# Patient Record
Sex: MALE | Race: BLACK OR AFRICAN AMERICAN | NOT HISPANIC OR LATINO | Employment: OTHER | URBAN - METROPOLITAN AREA
[De-identification: names, ages, dates, MRNs, and addresses within clinical notes are randomized per-mention and may not be internally consistent; named-entity substitution may affect disease eponyms.]

---

## 2019-10-04 ENCOUNTER — APPOINTMENT (OUTPATIENT)
Dept: LAB | Facility: HOSPITAL | Age: 64
End: 2019-10-04

## 2019-10-04 ENCOUNTER — OFFICE VISIT (OUTPATIENT)
Dept: FAMILY MEDICINE CLINIC | Facility: CLINIC | Age: 64
End: 2019-10-04

## 2019-10-04 VITALS
WEIGHT: 193 LBS | DIASTOLIC BLOOD PRESSURE: 92 MMHG | BODY MASS INDEX: 30.29 KG/M2 | RESPIRATION RATE: 16 BRPM | HEART RATE: 84 BPM | OXYGEN SATURATION: 100 % | HEIGHT: 67 IN | SYSTOLIC BLOOD PRESSURE: 200 MMHG

## 2019-10-04 DIAGNOSIS — Z23 IMMUNIZATION DUE: ICD-10-CM

## 2019-10-04 DIAGNOSIS — I10 ESSENTIAL HYPERTENSION: ICD-10-CM

## 2019-10-04 DIAGNOSIS — I16.0 HYPERTENSIVE URGENCY: Primary | ICD-10-CM

## 2019-10-04 DIAGNOSIS — E11.65 TYPE 2 DIABETES MELLITUS WITH HYPERGLYCEMIA, WITH LONG-TERM CURRENT USE OF INSULIN (HCC): ICD-10-CM

## 2019-10-04 DIAGNOSIS — Z79.4 TYPE 2 DIABETES MELLITUS WITH HYPERGLYCEMIA, WITH LONG-TERM CURRENT USE OF INSULIN (HCC): ICD-10-CM

## 2019-10-04 DIAGNOSIS — I25.810 CORONARY ARTERY DISEASE INVOLVING CORONARY BYPASS GRAFT OF NATIVE HEART, ANGINA PRESENCE UNSPECIFIED: ICD-10-CM

## 2019-10-04 LAB
ALBUMIN SERPL-MCNC: 4.5 G/DL (ref 3.5–5.2)
ALBUMIN UR-MCNC: 10.8 MG/DL
ALBUMIN/GLOB SERPL: 1.3 G/DL
ALP SERPL-CCNC: 61 U/L (ref 39–117)
ALT SERPL W P-5'-P-CCNC: 40 U/L (ref 1–41)
ANION GAP SERPL CALCULATED.3IONS-SCNC: 12.3 MMOL/L (ref 5–15)
AST SERPL-CCNC: 26 U/L (ref 1–40)
BACTERIA UR QL AUTO: ABNORMAL /HPF
BASOPHILS # BLD AUTO: 0.07 10*3/MM3 (ref 0–0.2)
BASOPHILS NFR BLD AUTO: 1.1 % (ref 0–1.5)
BILIRUB SERPL-MCNC: 0.2 MG/DL (ref 0.2–1.2)
BILIRUB UR QL STRIP: NEGATIVE
BUN BLD-MCNC: 13 MG/DL (ref 8–23)
BUN/CREAT SERPL: 11.8 (ref 7–25)
CALCIUM SPEC-SCNC: 9.4 MG/DL (ref 8.6–10.5)
CHLORIDE SERPL-SCNC: 102 MMOL/L (ref 98–107)
CLARITY UR: CLEAR
CO2 SERPL-SCNC: 25.7 MMOL/L (ref 22–29)
COLOR UR: YELLOW
CREAT BLD-MCNC: 1.1 MG/DL (ref 0.76–1.27)
CREAT UR-MCNC: 193.5 MG/DL
DEPRECATED RDW RBC AUTO: 48.9 FL (ref 37–54)
EOSINOPHIL # BLD AUTO: 0.38 10*3/MM3 (ref 0–0.4)
EOSINOPHIL NFR BLD AUTO: 5.9 % (ref 0.3–6.2)
ERYTHROCYTE [DISTWIDTH] IN BLOOD BY AUTOMATED COUNT: 15.9 % (ref 12.3–15.4)
GFR SERPL CREATININE-BSD FRML MDRD: 82 ML/MIN/1.73
GLOBULIN UR ELPH-MCNC: 3.6 GM/DL
GLUCOSE BLD-MCNC: 163 MG/DL (ref 65–99)
GLUCOSE UR STRIP-MCNC: NEGATIVE MG/DL
HBA1C MFR BLD: 7.3 % (ref 4.8–5.6)
HCT VFR BLD AUTO: 49.4 % (ref 37.5–51)
HGB BLD-MCNC: 16.3 G/DL (ref 13–17.7)
HGB UR QL STRIP.AUTO: NEGATIVE
HYALINE CASTS UR QL AUTO: ABNORMAL /LPF
IMM GRANULOCYTES # BLD AUTO: 0.03 10*3/MM3 (ref 0–0.05)
IMM GRANULOCYTES NFR BLD AUTO: 0.5 % (ref 0–0.5)
KETONES UR QL STRIP: ABNORMAL
LEUKOCYTE ESTERASE UR QL STRIP.AUTO: NEGATIVE
LYMPHOCYTES # BLD AUTO: 2.51 10*3/MM3 (ref 0.7–3.1)
LYMPHOCYTES NFR BLD AUTO: 38.9 % (ref 19.6–45.3)
MCH RBC QN AUTO: 28.7 PG (ref 26.6–33)
MCHC RBC AUTO-ENTMCNC: 33 G/DL (ref 31.5–35.7)
MCV RBC AUTO: 87 FL (ref 79–97)
MICROALBUMIN/CREAT UR: 55.8 MG/G
MONOCYTES # BLD AUTO: 0.5 10*3/MM3 (ref 0.1–0.9)
MONOCYTES NFR BLD AUTO: 7.8 % (ref 5–12)
MUCOUS THREADS URNS QL MICRO: ABNORMAL /HPF
NEUTROPHILS # BLD AUTO: 2.96 10*3/MM3 (ref 1.7–7)
NEUTROPHILS NFR BLD AUTO: 45.8 % (ref 42.7–76)
NITRITE UR QL STRIP: NEGATIVE
NRBC BLD AUTO-RTO: 0 /100 WBC (ref 0–0.2)
PH UR STRIP.AUTO: 6 [PH] (ref 5–8)
PLATELET # BLD AUTO: 496 10*3/MM3 (ref 140–450)
PMV BLD AUTO: 9.8 FL (ref 6–12)
POTASSIUM BLD-SCNC: 4.3 MMOL/L (ref 3.5–5.2)
PROT SERPL-MCNC: 8.1 G/DL (ref 6–8.5)
PROT UR QL STRIP: ABNORMAL
RBC # BLD AUTO: 5.68 10*6/MM3 (ref 4.14–5.8)
RBC # UR: ABNORMAL /HPF
REF LAB TEST METHOD: ABNORMAL
SODIUM BLD-SCNC: 140 MMOL/L (ref 136–145)
SP GR UR STRIP: 1.02 (ref 1–1.03)
SPERM URNS QL MICRO: ABNORMAL /HPF
SQUAMOUS #/AREA URNS HPF: ABNORMAL /HPF
TSH SERPL DL<=0.05 MIU/L-ACNC: 0.35 UIU/ML (ref 0.27–4.2)
UROBILINOGEN UR QL STRIP: ABNORMAL
WBC NRBC COR # BLD: 6.45 10*3/MM3 (ref 3.4–10.8)
WBC UR QL AUTO: ABNORMAL /HPF

## 2019-10-04 PROCEDURE — 80053 COMPREHEN METABOLIC PANEL: CPT | Performed by: INTERNAL MEDICINE

## 2019-10-04 PROCEDURE — 90471 IMMUNIZATION ADMIN: CPT | Performed by: INTERNAL MEDICINE

## 2019-10-04 PROCEDURE — 90674 CCIIV4 VAC NO PRSV 0.5 ML IM: CPT | Performed by: INTERNAL MEDICINE

## 2019-10-04 PROCEDURE — 82043 UR ALBUMIN QUANTITATIVE: CPT | Performed by: INTERNAL MEDICINE

## 2019-10-04 PROCEDURE — 82570 ASSAY OF URINE CREATININE: CPT | Performed by: INTERNAL MEDICINE

## 2019-10-04 PROCEDURE — 84443 ASSAY THYROID STIM HORMONE: CPT | Performed by: INTERNAL MEDICINE

## 2019-10-04 PROCEDURE — 85025 COMPLETE CBC W/AUTO DIFF WBC: CPT | Performed by: INTERNAL MEDICINE

## 2019-10-04 PROCEDURE — 83036 HEMOGLOBIN GLYCOSYLATED A1C: CPT | Performed by: INTERNAL MEDICINE

## 2019-10-04 PROCEDURE — 99204 OFFICE O/P NEW MOD 45 MIN: CPT | Performed by: INTERNAL MEDICINE

## 2019-10-04 PROCEDURE — 81001 URINALYSIS AUTO W/SCOPE: CPT | Performed by: INTERNAL MEDICINE

## 2019-10-04 RX ORDER — INSULIN GLARGINE 100 [IU]/ML
INJECTION, SOLUTION SUBCUTANEOUS
COMMUNITY
Start: 2019-09-10 | End: 2019-10-04 | Stop reason: SDUPTHER

## 2019-10-04 RX ORDER — AMLODIPINE BESYLATE 10 MG/1
TABLET ORAL
COMMUNITY
Start: 2019-08-16 | End: 2019-10-04 | Stop reason: SDUPTHER

## 2019-10-04 RX ORDER — ASPIRIN 81 MG/1
81 TABLET ORAL DAILY
Qty: 90 TABLET | Refills: 3 | Status: SHIPPED | OUTPATIENT
Start: 2019-10-04 | End: 2019-12-10 | Stop reason: SDUPTHER

## 2019-10-04 RX ORDER — ASPIRIN 81 MG/1
81 TABLET ORAL DAILY
COMMUNITY
End: 2019-10-04 | Stop reason: SDUPTHER

## 2019-10-04 RX ORDER — METOPROLOL TARTRATE 50 MG/1
50 TABLET, FILM COATED ORAL 2 TIMES DAILY
Qty: 180 TABLET | Refills: 3 | Status: SHIPPED | OUTPATIENT
Start: 2019-10-04 | End: 2019-11-14

## 2019-10-04 RX ORDER — METOPROLOL TARTRATE 50 MG/1
TABLET, FILM COATED ORAL
COMMUNITY
Start: 2019-08-16 | End: 2019-10-04 | Stop reason: SDUPTHER

## 2019-10-04 RX ORDER — CLOPIDOGREL BISULFATE 75 MG/1
75 TABLET ORAL DAILY
Qty: 90 TABLET | Refills: 3 | Status: SHIPPED | OUTPATIENT
Start: 2019-10-04 | End: 2019-12-10 | Stop reason: SDUPTHER

## 2019-10-04 RX ORDER — INSULIN GLARGINE 100 [IU]/ML
100 INJECTION, SOLUTION SUBCUTANEOUS DAILY
Qty: 7 PEN | Refills: 5 | Status: ON HOLD | OUTPATIENT
Start: 2019-10-04 | End: 2019-12-07 | Stop reason: SDUPTHER

## 2019-10-04 RX ORDER — AMLODIPINE BESYLATE 10 MG/1
10 TABLET ORAL DAILY
Qty: 90 TABLET | Refills: 3 | Status: SHIPPED | OUTPATIENT
Start: 2019-10-04 | End: 2019-12-10 | Stop reason: SDUPTHER

## 2019-10-04 RX ORDER — LOSARTAN POTASSIUM 100 MG/1
100 TABLET ORAL DAILY
Qty: 90 TABLET | Refills: 3 | Status: SHIPPED | OUTPATIENT
Start: 2019-10-04 | End: 2019-12-07 | Stop reason: HOSPADM

## 2019-10-04 RX ORDER — LOSARTAN POTASSIUM 100 MG/1
TABLET ORAL
COMMUNITY
Start: 2019-09-16 | End: 2019-10-04 | Stop reason: SDUPTHER

## 2019-10-04 NOTE — PROGRESS NOTES
Chief Complaint:  Establish care, hypertension    HPI:  Gal Charles Pierce is a 64 y.o. male who presents today for establish care and follow-up chronic medical conditions.  Diabetes type 2-currently taking metformin thousand milligrams twice daily along with Basaglar 100 units in the morning.  Patient reports fasting sugars are typically 101 20s.  He reports his recent A1c several months ago was around 9%.  Hypertension-patient reports he has been taking medication every other day due to low supply.  He did not take any of his medication today.  Blood pressure checks at home consistently 140/90, sometimes 150 systolic.  Coronary artery disease- patient has status post triple bypass exactly 1 year ago, he has a history of 2 prior heart attacks with stenting.  His previous cardiology was at .  He denies any chest pain or shortness of breath.  Patient reports a provider recently told him to stop Plavix.  Is currently just taking aspirin daily.  Hyper lipidemia-patient reports history of high cholesterol although he cannot tolerate statins.  ROS:  Constitutional: no fevers, night sweats or unexplained weight loss  Eyes: no vision changes  ENT: no runny nose, ear pain, sore throat  Cardio: no chest pain, palpitations  Pulm: no shortness of breath, wheezing, or cough  GI: no abdominal pain or changes in bowel movements  : no difficulty urinating  MSK: no difficulty ambulating, no joint pain  Neuro: no weakness, dizziness or headache  Psych: no trouble sleeping  Endo: no change in appetite      Past Medical History:   Diagnosis Date   • Diabetes mellitus (CMS/[facility])    • Hyperlipidemia    • Hypertension       Family History   Problem Relation Age of Onset   • Diabetes Mother    • Aneurysm Mother    • Hypertension Father    • Diabetes Father    • Heart attack Father       Social History     Socioeconomic History   • Marital status:      Spouse name: Not on file   • Number of children: Not on file   • Years of  education: Not on file   • Highest education level: Not on file   Tobacco Use   • Smoking status: Never Smoker   • Smokeless tobacco: Never Used   Substance and Sexual Activity   • Alcohol use: No     Frequency: Never   • Drug use: No   • Sexual activity: Defer      Allergies   Allergen Reactions   • Crestor [Rosuvastatin Calcium] Myalgia   • Lipitor [Atorvastatin Calcium] Myalgia   • Penicillins Swelling        There is no immunization history on file for this patient.     PE:  Vitals:    10/04/19 1441   BP: (!) 200/92   Pulse: 84   Resp: 16   SpO2: 100%        Gen Appearance: NAD  HEENT: Normocephalic, PERRLA, no thyromegaly, trache midline  Heart: RRR, normal S1 and S2, no murmur  Lungs: CTA b/l, no wheezing, no crackles  Abdomen: Soft, non-tender, non-distended, no guarding and BSx4  MSK: Moves all extremities well, normal gait, no peripheral edema  Pulses: Palpable and equal b/l  Lymph nodes: No palpable lymphadenopathy   Neuro: No focal deficits      Current Outpatient Medications   Medication Sig Dispense Refill   • amLODIPine (NORVASC) 10 MG tablet Take 1 tablet by mouth Daily. 90 tablet 3   • aspirin 81 MG EC tablet Take 1 tablet by mouth Daily. 90 tablet 3   • Insulin Glargine (BASAGLAR KWIKPEN) 100 UNIT/ML injection pen Inject 100 Units under the skin into the appropriate area as directed Daily. 7 pen 5   • losartan (COZAAR) 100 MG tablet Take 1 tablet by mouth Daily. 90 tablet 3   • metoprolol tartrate (LOPRESSOR) 50 MG tablet Take 1 tablet by mouth 2 (Two) Times a Day. 180 tablet 3   • Multiple Vitamins-Minerals (CENTRUM SILVER PO) Take  by mouth.     • clopidogrel (PLAVIX) 75 MG tablet Take 1 tablet by mouth Daily. 90 tablet 3   • metFORMIN (GLUCOPHAGE) 1000 MG tablet Take 1 tablet by mouth 2 (Two) Times a Day With Meals. 120 tablet 5     No current facility-administered medications for this visit.         Gal was seen today for establish care.    Diagnoses and all orders for this  visit:    Hypertensive urgency  -     amLODIPine (NORVASC) 10 MG tablet; Take 1 tablet by mouth Daily.  -     losartan (COZAAR) 100 MG tablet; Take 1 tablet by mouth Daily.  -     metoprolol tartrate (LOPRESSOR) 50 MG tablet; Take 1 tablet by mouth 2 (Two) Times a Day.  -     aspirin 81 MG EC tablet; Take 1 tablet by mouth Daily.  Checking blood work today.  Recommend resuming previously prescribed medications.  Will likely need to add on diuretic with his current home blood pressure readings.  Follow-up 2 weeks for reassessment.  If still over 130/80 would recommend adding on chlorthalidone.  Essential hypertension  -     amLODIPine (NORVASC) 10 MG tablet; Take 1 tablet by mouth Daily.  -     losartan (COZAAR) 100 MG tablet; Take 1 tablet by mouth Daily.  -     metoprolol tartrate (LOPRESSOR) 50 MG tablet; Take 1 tablet by mouth 2 (Two) Times a Day.  -     aspirin 81 MG EC tablet; Take 1 tablet by mouth Daily.  -     TSH; Future  -     Urinalysis With Culture If Indicated - Urine, Clean Catch; Future  See above.  Type 2 diabetes mellitus with hyperglycemia, with long-term current use of insulin (CMS/[facility])  -     Insulin Glargine (BASAGLAR KWIKPEN) 100 UNIT/ML injection pen; Inject 100 Units under the skin into the appropriate area as directed Daily.  -     aspirin 81 MG EC tablet; Take 1 tablet by mouth Daily.  -     CBC & Differential; Future  -     Comprehensive Metabolic Panel; Future  -     Cancel: Lipid Panel; Future  -     Hemoglobin A1c; Future  -     Microalbumin / Creatinine Urine Ratio - Urine, Clean Catch; Future  -     metFORMIN (GLUCOPHAGE) 1000 MG tablet; Take 1 tablet by mouth 2 (Two) Times a Day With Meals.  Rechecking A1c today.  Recommend checking blood sugar multiple times throughout the day along with fasting sugar.  Follow-up 2 weeks.  Bring values in at that time.  Continue current regimen for now.  Sending in refill on metformin.  Coronary artery disease involving coronary bypass graft of  native heart, angina presence unspecified  -     clopidogrel (PLAVIX) 75 MG tablet; Take 1 tablet by mouth Daily.  Recommend resuming Plavix with his history of multiple stents and recent bypass.  Will place cardiology referral to establish care.  Asymptomatic at this time.  On aspirin and beta-blocker, cannot tolerate statins.  HLD  Recommend repeat fasting lipid panel.  We will request [facility] records as well.    No Follow-up on file.     Please note that portions of this document were completed with a voice recognition program. Efforts were made to edit the dictations, but occasionally words are mis-transcribed.

## 2019-10-22 ENCOUNTER — OFFICE VISIT (OUTPATIENT)
Dept: FAMILY MEDICINE CLINIC | Facility: CLINIC | Age: 64
End: 2019-10-22

## 2019-10-22 VITALS
SYSTOLIC BLOOD PRESSURE: 132 MMHG | DIASTOLIC BLOOD PRESSURE: 66 MMHG | BODY MASS INDEX: 30.35 KG/M2 | WEIGHT: 193.4 LBS | HEART RATE: 80 BPM | HEIGHT: 67 IN | OXYGEN SATURATION: 96 %

## 2019-10-22 DIAGNOSIS — I25.810 CORONARY ARTERY DISEASE INVOLVING CORONARY BYPASS GRAFT OF NATIVE HEART WITHOUT ANGINA PECTORIS: ICD-10-CM

## 2019-10-22 DIAGNOSIS — E11.65 TYPE 2 DIABETES MELLITUS WITH HYPERGLYCEMIA, WITH LONG-TERM CURRENT USE OF INSULIN (HCC): Primary | ICD-10-CM

## 2019-10-22 DIAGNOSIS — Z79.4 TYPE 2 DIABETES MELLITUS WITH HYPERGLYCEMIA, WITH LONG-TERM CURRENT USE OF INSULIN (HCC): Primary | ICD-10-CM

## 2019-10-22 DIAGNOSIS — I10 ESSENTIAL HYPERTENSION: ICD-10-CM

## 2019-10-22 PROCEDURE — 99214 OFFICE O/P EST MOD 30 MIN: CPT | Performed by: INTERNAL MEDICINE

## 2019-11-13 NOTE — PROGRESS NOTES
[facility]  [facility]      Encounter Date:11/14/2019     Gal Pierce  [address] [address]  [unfilled]    1955    Umer Tovar,     Gal Pierce is a 64 y.o. male.      Subjective:     Chief Complaint:  Establish Care (CABG)       HPI   Patient is a 64-year-old male with past medical history significant for hypertension, type 2 diabetes, hyperlipidemia and coronary artery disease status post CABG who presents to the [facility] at the request of Dr. Tovar to establish care.  Patient has a history of coronary artery disease status post non-STEMI in 2002 with drug-eluting stent to the LAD, non-STEMI 2004 with drug-eluting stent to the left circumflex and anterior wall STEMI in October 2018 status post four-vessel CABG with Dr. Slater.  He is currently asymptomatic.  He reports that he is active without any exertional shortness of breath or chest pain.  He reports that he monitors his blood pressure at home and systolic blood pressure is typically around 160, which he thought was good for him.  He has a history of statin intolerance and reports that he is tried atorvastatin, rosuvastatin and simvastatin.  He is very reluctant to start any new medications today    Patient Active Problem List   Diagnosis   • Essential hypertension   • Type 2 diabetes mellitus with hyperglycemia, with long-term current use of insulin (CMS/HCC)   • Coronary artery disease involving coronary bypass graft of native heart   • Hyperlipidemia LDL goal <70       Past Medical History:   Diagnosis Date   • Diabetes mellitus (CMS/HCC)    • Hyperlipidemia    • Hypertension        Past Surgical History:   Procedure Laterality Date   • APPENDECTOMY     • BYPASS GRAFT  10/2018    Triple Bypass       Family History   Problem Relation Age of Onset   • Diabetes Mother    • Aneurysm Mother    • Hypertension Father    • Diabetes Father    • Heart attack Father    •  Diabetes Sister    • No Known Problems Brother    • Diabetes Maternal Grandmother    • Hypertension Maternal Grandmother    • Diabetes Maternal Grandfather    • Hypertension Maternal Grandfather    • Diabetes Paternal Grandmother    • Hypertension Paternal Grandmother    • Diabetes Paternal Grandfather    • Hypertension Paternal Grandfather    • Diabetes Sister    • Diabetes Sister    • Cancer Sister    • Hypertension Brother    • Heart disease Brother    • Diabetes Brother    • Hypertension Brother        Social History     Socioeconomic History   • Marital status:      Spouse name: Not on file   • Number of children: Not on file   • Years of education: Not on file   • Highest education level: Not on file   Tobacco Use   • Smoking status: Former Smoker   • Smokeless tobacco: Never Used   Substance and Sexual Activity   • Alcohol use: No     Frequency: Never   • Drug use: No   • Sexual activity: Defer   Social History Narrative    Caffeine: 2-3 cups coffee daily, rare soda       Allergies   Allergen Reactions   • Crestor [Rosuvastatin Calcium] Myalgia   • Hydrochlorothiazide Other (See Comments)   • Lipitor [Atorvastatin Calcium] Myalgia   • Penicillins Swelling         Current Outpatient Medications:   •  amLODIPine (NORVASC) 10 MG tablet, Take 1 tablet by mouth Daily., Disp: 90 tablet, Rfl: 3  •  aspirin 81 MG EC tablet, Take 1 tablet by mouth Daily., Disp: 90 tablet, Rfl: 3  •  clopidogrel (PLAVIX) 75 MG tablet, Take 1 tablet by mouth Daily., Disp: 90 tablet, Rfl: 3  •  Insulin Glargine (BASAGLAR KWIKPEN) 100 UNIT/ML injection pen, Inject 100 Units under the skin into the appropriate area as directed Daily., Disp: 7 pen, Rfl: 5  •  losartan (COZAAR) 100 MG tablet, Take 1 tablet by mouth Daily., Disp: 90 tablet, Rfl: 3  •  metFORMIN (GLUCOPHAGE) 1000 MG tablet, Take 1 tablet by mouth 2 (Two) Times a Day With Meals., Disp: 120 tablet, Rfl: 5  •  Multiple Vitamins-Minerals (CENTRUM SILVER PO), Take  by  "mouth., Disp: , Rfl:   •  carvedilol (COREG) 12.5 MG tablet, Take 1 tablet by mouth 2 (Two) Times a Day., Disp: 60 tablet, Rfl: 3  •  pravastatin (PRAVACHOL) 40 MG tablet, Take 1 tablet by mouth Every Night., Disp: 30 tablet, Rfl: 3    The following portions of the patient's history were reviewed today and updated as appropriate: allergies, current medications, past family history, past medical history, past social history, past surgical history and problem list     Review of Systems   Constitution: Negative for chills and fever.   HENT: Negative.    Eyes: Negative.    Cardiovascular: Negative for chest pain, claudication, cyanosis, dyspnea on exertion, irregular heartbeat, leg swelling, near-syncope, orthopnea, palpitations, paroxysmal nocturnal dyspnea and syncope.   Respiratory: Negative for cough, shortness of breath and snoring.    Endocrine: Negative.    Hematologic/Lymphatic: Does not bruise/bleed easily.   Skin: Negative for poor wound healing.   Musculoskeletal: Negative.    Gastrointestinal: Negative for abdominal pain, heartburn, hematemesis, melena, nausea and vomiting.   Genitourinary: Negative.  Negative for hematuria.   Neurological: Positive for light-headedness.   Psychiatric/Behavioral: Negative.    Allergic/Immunologic: Positive for environmental allergies.       Objective:     Vitals:    11/14/19 1012 11/14/19 1013 11/14/19 1014   BP: 167/78 176/76 172/80   BP Location: Right arm Left arm Left arm   Patient Position: Sitting Sitting Standing   Cuff Size: Large Adult Large Adult Large Adult   Pulse: 73  74   Resp: 20     Temp: 98.8 °F (37.1 °C)     TempSrc: Temporal     SpO2: 97%  92%   Weight: 90.4 kg (199 lb 4 oz)     Height: 170.2 cm (67\")         Body mass index is 31.21 kg/m².    Physical Exam   Constitutional: He is oriented to person, place, and time. He appears well-developed and well-nourished. No distress.   HENT:   Head: Normocephalic.   Eyes: Conjunctivae are normal. Pupils are equal, " round, and reactive to light.   Neck: Neck supple. No JVD present. No thyromegaly present.   Cardiovascular: Normal rate, regular rhythm, normal heart sounds and intact distal pulses. Exam reveals no gallop and no friction rub.   No murmur heard.  Pulmonary/Chest: Effort normal and breath sounds normal. No respiratory distress. He has no wheezes. He has no rales. He exhibits no tenderness.   Abdominal: Soft. Bowel sounds are normal.   Musculoskeletal: Normal range of motion. He exhibits no edema.   Neurological: He is alert and oriented to person, place, and time.   Skin: Skin is warm and dry.   Psychiatric: He has a normal mood and affect. His behavior is normal. Thought content normal.   Vitals reviewed.      Lab and Diagnostic Review:  Results for orders placed or performed in visit on 10/04/19   Comprehensive Metabolic Panel   Result Value Ref Range    Glucose 163 (H) 65 - 99 mg/dL    BUN 13 8 - 23 mg/dL    Creatinine 1.10 0.76 - 1.27 mg/dL    Sodium 140 136 - 145 mmol/L    Potassium 4.3 3.5 - 5.2 mmol/L    Chloride 102 98 - 107 mmol/L    CO2 25.7 22.0 - 29.0 mmol/L    Calcium 9.4 8.6 - 10.5 mg/dL    Total Protein 8.1 6.0 - 8.5 g/dL    Albumin 4.50 3.50 - 5.20 g/dL    ALT (SGPT) 40 1 - 41 U/L    AST (SGOT) 26 1 - 40 U/L    Alkaline Phosphatase 61 39 - 117 U/L    Total Bilirubin 0.2 0.2 - 1.2 mg/dL    eGFR  African Amer 82 >60 mL/min/1.73    Globulin 3.6 gm/dL    A/G Ratio 1.3 g/dL    BUN/Creatinine Ratio 11.8 7.0 - 25.0    Anion Gap 12.3 5.0 - 15.0 mmol/L   TSH   Result Value Ref Range    TSH 0.348 0.270 - 4.200 uIU/mL   Urinalysis With Culture If Indicated - Urine, Clean Catch   Result Value Ref Range    Color, UA Yellow Yellow, Straw    Appearance, UA Clear Clear    pH, UA 6.0 5.0 - 8.0    Specific Gravity, UA 1.025 1.005 - 1.030    Glucose, UA Negative Negative    Ketones, UA Trace (A) Negative    Bilirubin, UA Negative Negative    Blood, UA Negative Negative    Protein, UA 30 mg/dL (1+) (A) Negative    Leuk  Esterase, UA Negative Negative    Nitrite, UA Negative Negative    Urobilinogen, UA 0.2 E.U./dL 0.2 - 1.0 E.U./dL   Hemoglobin A1c   Result Value Ref Range    Hemoglobin A1C 7.30 (H) 4.80 - 5.60 %   Microalbumin / Creatinine Urine Ratio - Urine, Clean Catch   Result Value Ref Range    Microalbumin/Creatinine Ratio 55.8 mg/g    Creatinine, Urine 193.5 mg/dL    Microalbumin, Urine 10.8 mg/dL   CBC Auto Differential   Result Value Ref Range    WBC 6.45 3.40 - 10.80 10*3/mm3    RBC 5.68 4.14 - 5.80 10*6/mm3    Hemoglobin 16.3 13.0 - 17.7 g/dL    Hematocrit 49.4 37.5 - 51.0 %    MCV 87.0 79.0 - 97.0 fL    MCH 28.7 26.6 - 33.0 pg    MCHC 33.0 31.5 - 35.7 g/dL    RDW 15.9 (H) 12.3 - 15.4 %    RDW-SD 48.9 37.0 - 54.0 fl    MPV 9.8 6.0 - 12.0 fL    Platelets 496 (H) 140 - 450 10*3/mm3    Neutrophil % 45.8 42.7 - 76.0 %    Lymphocyte % 38.9 19.6 - 45.3 %    Monocyte % 7.8 5.0 - 12.0 %    Eosinophil % 5.9 0.3 - 6.2 %    Basophil % 1.1 0.0 - 1.5 %    Immature Grans % 0.5 0.0 - 0.5 %    Neutrophils, Absolute 2.96 1.70 - 7.00 10*3/mm3    Lymphocytes, Absolute 2.51 0.70 - 3.10 10*3/mm3    Monocytes, Absolute 0.50 0.10 - 0.90 10*3/mm3    Eosinophils, Absolute 0.38 0.00 - 0.40 10*3/mm3    Basophils, Absolute 0.07 0.00 - 0.20 10*3/mm3    Immature Grans, Absolute 0.03 0.00 - 0.05 10*3/mm3    nRBC 0.0 0.0 - 0.2 /100 WBC   Urinalysis, Microscopic Only - Urine, Clean Catch   Result Value Ref Range    RBC, UA 0-2 None Seen, 0-2 /HPF    WBC, UA 3-5 (A) None Seen, 0-2 /HPF    Bacteria, UA None Seen None Seen /HPF    Squamous Epithelial Cells, UA 0-2 None Seen, 0-2 /HPF    Hyaline Casts, UA None Seen None Seen /LPF    Sperm, UA Small/1+ (A) None Seen /HPF    Mucus, UA Small/1+ (A) None Seen, Trace /HPF    Methodology Manual Light Microscopy      Lipid panel 8/16/2019 showed a total cholesterol 205, HDL 39, , triglycerides 71    Assessment and Plan:   1. Coronary artery disease involving coronary bypass graft of native heart without  angina pectoris  Stable without angina  Patient defers EKG today because he says he had one done recently and not sure if insurance will pay. This is a reasonable request since exam normal and he is asymptomatic  Long discussion regarding cardiac prevention.  Discussed indications for statin medications and prevention of plaque rupture.  He is very hesitant to retry any statins but after further convincing he is willing to start pravastatin.  I told him he could consider trying co-Q10 to help with myalgias, although literature is limited on its use.  If he does not tolerate pravastatin he may need to consider Zetia and/or PCSK 9 inhibitor   Will get [facility] records from  and [facility]  - Ambulatory Referral to Cardiology    2. Essential hypertension  Uncontrolled. I rechecked manually and it was still elevated.  He is rather adamant that he does not want to start any new medication.  We will try changing his metoprolol over to carvedilol to see if he gets any better blood pressure control and I have explained to him that this also may be more beneficial due to his race and diabetes.   - Ambulatory Referral to Cardiology    3. Hyperlipidemia LDL goal <70  Start pravastatin  - Ambulatory Referral to Cardiology    RV in 2 weeks for BP recheck. However, if he gets into cardiology close to this time I have told him he can cancel my appointment      It has been a pleasure to participate in the care of this patient.  Patient was instructed to call the [facility] with any questions, concerns, or worsening symptoms.    *Please note that portions of this note were completed with a voice recognition program. Efforts were made to edit the dictations, but occasionally words are mistranscribed.

## 2019-11-14 ENCOUNTER — OFFICE VISIT (OUTPATIENT)
Dept: CARDIOLOGY | Facility: HOSPITAL | Age: 64
End: 2019-11-14

## 2019-11-14 VITALS
HEIGHT: 67 IN | TEMPERATURE: 98.8 F | WEIGHT: 199.25 LBS | HEART RATE: 74 BPM | BODY MASS INDEX: 31.27 KG/M2 | OXYGEN SATURATION: 92 % | SYSTOLIC BLOOD PRESSURE: 172 MMHG | DIASTOLIC BLOOD PRESSURE: 80 MMHG | RESPIRATION RATE: 20 BRPM

## 2019-11-14 DIAGNOSIS — E78.5 HYPERLIPIDEMIA LDL GOAL <70: ICD-10-CM

## 2019-11-14 DIAGNOSIS — I25.810 CORONARY ARTERY DISEASE INVOLVING CORONARY BYPASS GRAFT OF NATIVE HEART WITHOUT ANGINA PECTORIS: Primary | ICD-10-CM

## 2019-11-14 DIAGNOSIS — I10 ESSENTIAL HYPERTENSION: ICD-10-CM

## 2019-11-14 PROCEDURE — 99204 OFFICE O/P NEW MOD 45 MIN: CPT | Performed by: NURSE PRACTITIONER

## 2019-11-14 RX ORDER — CARVEDILOL 12.5 MG/1
12.5 TABLET ORAL 2 TIMES DAILY
Qty: 60 TABLET | Refills: 3 | Status: SHIPPED | OUTPATIENT
Start: 2019-11-14 | End: 2019-12-10 | Stop reason: SDUPTHER

## 2019-11-14 RX ORDER — PRAVASTATIN SODIUM 40 MG
40 TABLET ORAL NIGHTLY
Qty: 30 TABLET | Refills: 3 | Status: ON HOLD | OUTPATIENT
Start: 2019-11-14 | End: 2019-12-07

## 2019-11-14 NOTE — PATIENT INSTRUCTIONS
Stop metoprolol and replace with carvedilol  Start pravastatin at bedtime for cholesterol. If you have muscle pain, you can get coq10 over the counter

## 2019-11-18 ENCOUNTER — TELEPHONE (OUTPATIENT)
Dept: CARDIOLOGY | Facility: HOSPITAL | Age: 64
End: 2019-11-18

## 2019-11-18 NOTE — TELEPHONE ENCOUNTER
Gal Pierce   Male, 64 y.o., 1955  Weight:   90.4 kg (199 lb 4 oz)  Phone:   [phone] (M)  PCP:   Umer Tovar DO  MRN:   [id number]  MyChart:   Pending  Next Appt:   12/02/2019  Message   Received: 3 days ago   Message Contents   Caryn Galvez APRN Morrison, Ashley M, CMA             That's okay. I actually just noticed that they made him an appt 12/10 with . Will you please call him and tell him that he does not need to follow up with me 12/2? Just tell him to keep his appt with Dr. Faust and keep monitoring his BPs at home.     Thank you!    Previous Messages      ----- Message -----   From: Yesy Lester CMA   Sent: 11/15/2019   2:03 PM   To: CHERELLE Rodriguez,   Called [facility] and for some reason they sent it to [facility]. I asked them to resend the records to our fax.     Sorry for the inconvenience!   ----- Message -----   From: Caryn Galvez APRN   Sent: 11/14/2019  11:09 AM   To: Yesy Lester CMA     Can you please get cardiology records from [facility]? I am looking for cath reports, EKG and echo     Thank you!

## 2019-11-18 NOTE — TELEPHONE ENCOUNTER
Called patient and notified patient that he should keep appointment with [facility] and canceled patient for our office.

## 2019-12-06 ENCOUNTER — HOSPITAL ENCOUNTER (OUTPATIENT)
Facility: HOSPITAL | Age: 64
Setting detail: OBSERVATION
Discharge: HOME | End: 2019-12-07
Attending: EMERGENCY MEDICINE | Admitting: INTERNAL MEDICINE

## 2019-12-06 DIAGNOSIS — Z86.79 HISTORY OF CORONARY ARTERY DISEASE: ICD-10-CM

## 2019-12-06 DIAGNOSIS — Z79.4 TYPE 2 DIABETES MELLITUS WITH HYPERGLYCEMIA, WITH LONG-TERM CURRENT USE OF INSULIN (HCC): ICD-10-CM

## 2019-12-06 DIAGNOSIS — Z86.39 HISTORY OF DIABETES MELLITUS: ICD-10-CM

## 2019-12-06 DIAGNOSIS — E11.65 TYPE 2 DIABETES MELLITUS WITH HYPERGLYCEMIA, WITH LONG-TERM CURRENT USE OF INSULIN (HCC): ICD-10-CM

## 2019-12-06 DIAGNOSIS — N17.9 ACUTE KIDNEY INJURY (HCC): ICD-10-CM

## 2019-12-06 DIAGNOSIS — N04.9 KIDNEY INFLAMMATION WITH EDEMA: ICD-10-CM

## 2019-12-06 DIAGNOSIS — R10.2 SUPRAPUBIC ABDOMINAL PAIN: Primary | ICD-10-CM

## 2019-12-06 DIAGNOSIS — N20.0 RIGHT NEPHROLITHIASIS: ICD-10-CM

## 2019-12-06 PROBLEM — M79.89 ARM SWELLING: Status: ACTIVE | Noted: 2019-12-06

## 2019-12-06 LAB
ALBUMIN SERPL-MCNC: 4.2 G/DL (ref 3.5–5.2)
ALBUMIN/GLOB SERPL: 1.1 G/DL
ALP SERPL-CCNC: 50 U/L (ref 39–117)
ALT SERPL W P-5'-P-CCNC: 30 U/L (ref 1–41)
ANION GAP SERPL CALCULATED.3IONS-SCNC: 13 MMOL/L (ref 5–15)
AST SERPL-CCNC: 29 U/L (ref 1–40)
BASOPHILS # BLD AUTO: 0.08 10*3/MM3 (ref 0–0.2)
BASOPHILS NFR BLD AUTO: 0.7 % (ref 0–1.5)
BILIRUB SERPL-MCNC: 0.4 MG/DL (ref 0.2–1.2)
BUN BLD-MCNC: 23 MG/DL (ref 8–23)
BUN/CREAT SERPL: 10.9 (ref 7–25)
CALCIUM SPEC-SCNC: 9.8 MG/DL (ref 8.6–10.5)
CHLORIDE SERPL-SCNC: 103 MMOL/L (ref 98–107)
CO2 SERPL-SCNC: 23 MMOL/L (ref 22–29)
CREAT BLD-MCNC: 2.11 MG/DL (ref 0.76–1.27)
DEPRECATED RDW RBC AUTO: 52.7 FL (ref 37–54)
EOSINOPHIL # BLD AUTO: 0.25 10*3/MM3 (ref 0–0.4)
EOSINOPHIL NFR BLD AUTO: 2.3 % (ref 0.3–6.2)
ERYTHROCYTE [DISTWIDTH] IN BLOOD BY AUTOMATED COUNT: 16.5 % (ref 12.3–15.4)
GFR SERPL CREATININE-BSD FRML MDRD: 39 ML/MIN/1.73
GLOBULIN UR ELPH-MCNC: 3.7 GM/DL
GLUCOSE BLD-MCNC: 116 MG/DL (ref 65–99)
HCT VFR BLD AUTO: 50.9 % (ref 37.5–51)
HGB BLD-MCNC: 16.7 G/DL (ref 13–17.7)
IMM GRANULOCYTES # BLD AUTO: 0.03 10*3/MM3 (ref 0–0.05)
IMM GRANULOCYTES NFR BLD AUTO: 0.3 % (ref 0–0.5)
LIPASE SERPL-CCNC: 27 U/L (ref 13–60)
LYMPHOCYTES # BLD AUTO: 1.85 10*3/MM3 (ref 0.7–3.1)
LYMPHOCYTES NFR BLD AUTO: 16.9 % (ref 19.6–45.3)
MCH RBC QN AUTO: 29.2 PG (ref 26.6–33)
MCHC RBC AUTO-ENTMCNC: 32.8 G/DL (ref 31.5–35.7)
MCV RBC AUTO: 89 FL (ref 79–97)
MONOCYTES # BLD AUTO: 1.12 10*3/MM3 (ref 0.1–0.9)
MONOCYTES NFR BLD AUTO: 10.2 % (ref 5–12)
NEUTROPHILS # BLD AUTO: 7.61 10*3/MM3 (ref 1.7–7)
NEUTROPHILS NFR BLD AUTO: 69.6 % (ref 42.7–76)
NRBC BLD AUTO-RTO: 0 /100 WBC (ref 0–0.2)
PLATELET # BLD AUTO: 498 10*3/MM3 (ref 140–450)
PMV BLD AUTO: 9.4 FL (ref 6–12)
POTASSIUM BLD-SCNC: 4.2 MMOL/L (ref 3.5–5.2)
PROT SERPL-MCNC: 7.9 G/DL (ref 6–8.5)
RBC # BLD AUTO: 5.72 10*6/MM3 (ref 4.14–5.8)
SODIUM BLD-SCNC: 139 MMOL/L (ref 136–145)
WBC NRBC COR # BLD: 10.94 10*3/MM3 (ref 3.4–10.8)

## 2019-12-06 PROCEDURE — 80053 COMPREHEN METABOLIC PANEL: CPT

## 2019-12-06 PROCEDURE — 83690 ASSAY OF LIPASE: CPT

## 2019-12-06 PROCEDURE — 99284 EMERGENCY DEPT VISIT MOD MDM: CPT

## 2019-12-06 PROCEDURE — 85025 COMPLETE CBC W/AUTO DIFF WBC: CPT

## 2019-12-06 PROCEDURE — 83605 ASSAY OF LACTIC ACID: CPT | Performed by: EMERGENCY MEDICINE

## 2019-12-06 RX ORDER — SODIUM CHLORIDE 0.9 % (FLUSH) 0.9 %
10 SYRINGE (ML) INJECTION AS NEEDED
Status: DISCONTINUED | OUTPATIENT
Start: 2019-12-06 | End: 2019-12-07 | Stop reason: HOSPADM

## 2019-12-07 ENCOUNTER — APPOINTMENT (OUTPATIENT)
Dept: CT IMAGING | Facility: HOSPITAL | Age: 64
End: 2019-12-07

## 2019-12-07 VITALS
RESPIRATION RATE: 18 BRPM | BODY MASS INDEX: 28.79 KG/M2 | HEIGHT: 68 IN | OXYGEN SATURATION: 93 % | TEMPERATURE: 98 F | HEART RATE: 92 BPM | DIASTOLIC BLOOD PRESSURE: 87 MMHG | SYSTOLIC BLOOD PRESSURE: 172 MMHG | WEIGHT: 190 LBS

## 2019-12-07 PROBLEM — R10.2 SUPRAPUBIC ABDOMINAL PAIN: Status: RESOLVED | Noted: 2019-12-07 | Resolved: 2019-12-07

## 2019-12-07 PROBLEM — N17.9 ACUTE KIDNEY INJURY (HCC): Status: ACTIVE | Noted: 2019-12-07

## 2019-12-07 PROBLEM — R10.2 SUPRAPUBIC ABDOMINAL PAIN: Status: ACTIVE | Noted: 2019-12-07

## 2019-12-07 PROBLEM — N12 PYELONEPHRITIS: Status: ACTIVE | Noted: 2019-12-07

## 2019-12-07 PROBLEM — R10.9 FLANK PAIN: Status: ACTIVE | Noted: 2019-12-07

## 2019-12-07 LAB
ANION GAP SERPL CALCULATED.3IONS-SCNC: 17 MMOL/L (ref 5–15)
BILIRUB UR QL STRIP: NEGATIVE
BUN BLD-MCNC: 25 MG/DL (ref 8–23)
BUN/CREAT SERPL: 12.4 (ref 7–25)
CALCIUM SPEC-SCNC: 9.3 MG/DL (ref 8.6–10.5)
CHLORIDE SERPL-SCNC: 102 MMOL/L (ref 98–107)
CLARITY UR: CLEAR
CO2 SERPL-SCNC: 20 MMOL/L (ref 22–29)
COLOR UR: YELLOW
CREAT BLD-MCNC: 2.01 MG/DL (ref 0.76–1.27)
D-LACTATE SERPL-SCNC: 1.1 MMOL/L (ref 0.5–2)
DEPRECATED RDW RBC AUTO: 52.9 FL (ref 37–54)
ERYTHROCYTE [DISTWIDTH] IN BLOOD BY AUTOMATED COUNT: 16.6 % (ref 12.3–15.4)
GFR SERPL CREATININE-BSD FRML MDRD: 41 ML/MIN/1.73
GLUCOSE BLD-MCNC: 168 MG/DL (ref 65–99)
GLUCOSE BLDC GLUCOMTR-MCNC: 156 MG/DL (ref 70–130)
GLUCOSE UR STRIP-MCNC: NEGATIVE MG/DL
HCT VFR BLD AUTO: 49.7 % (ref 37.5–51)
HGB BLD-MCNC: 16 G/DL (ref 13–17.7)
HGB UR QL STRIP.AUTO: NEGATIVE
HOLD SPECIMEN: NORMAL
HOLD SPECIMEN: NORMAL
KETONES UR QL STRIP: NEGATIVE
LEUKOCYTE ESTERASE UR QL STRIP.AUTO: NEGATIVE
MCH RBC QN AUTO: 28.5 PG (ref 26.6–33)
MCHC RBC AUTO-ENTMCNC: 32.2 G/DL (ref 31.5–35.7)
MCV RBC AUTO: 88.4 FL (ref 79–97)
NITRITE UR QL STRIP: NEGATIVE
PH UR STRIP.AUTO: 7 [PH] (ref 5–8)
PLATELET # BLD AUTO: 481 10*3/MM3 (ref 140–450)
PMV BLD AUTO: 9.7 FL (ref 6–12)
POTASSIUM BLD-SCNC: 4.1 MMOL/L (ref 3.5–5.2)
PROT UR QL STRIP: NEGATIVE
RBC # BLD AUTO: 5.62 10*6/MM3 (ref 4.14–5.8)
SODIUM BLD-SCNC: 139 MMOL/L (ref 136–145)
SP GR UR STRIP: 1.01 (ref 1–1.03)
UROBILINOGEN UR QL STRIP: NORMAL
WBC NRBC COR # BLD: 10.81 10*3/MM3 (ref 3.4–10.8)
WHOLE BLOOD HOLD SPECIMEN: NORMAL
WHOLE BLOOD HOLD SPECIMEN: NORMAL

## 2019-12-07 PROCEDURE — 87086 URINE CULTURE/COLONY COUNT: CPT | Performed by: PHYSICIAN ASSISTANT

## 2019-12-07 PROCEDURE — 93005 ELECTROCARDIOGRAM TRACING: CPT | Performed by: PHYSICIAN ASSISTANT

## 2019-12-07 PROCEDURE — 81003 URINALYSIS AUTO W/O SCOPE: CPT | Performed by: EMERGENCY MEDICINE

## 2019-12-07 PROCEDURE — 80048 BASIC METABOLIC PNL TOTAL CA: CPT | Performed by: PHYSICIAN ASSISTANT

## 2019-12-07 PROCEDURE — 25010000002 MORPHINE PER 10 MG: Performed by: PHYSICIAN ASSISTANT

## 2019-12-07 PROCEDURE — 25010000002 CEFTRIAXONE: Performed by: PHYSICIAN ASSISTANT

## 2019-12-07 PROCEDURE — 96365 THER/PROPH/DIAG IV INF INIT: CPT

## 2019-12-07 PROCEDURE — 85027 COMPLETE CBC AUTOMATED: CPT | Performed by: PHYSICIAN ASSISTANT

## 2019-12-07 PROCEDURE — 74176 CT ABD & PELVIS W/O CONTRAST: CPT

## 2019-12-07 PROCEDURE — 25010000002 HYDROMORPHONE 1 MG/ML SOLUTION

## 2019-12-07 PROCEDURE — 25010000002 HEPARIN (PORCINE) PER 1000 UNITS: Performed by: PHYSICIAN ASSISTANT

## 2019-12-07 PROCEDURE — 63710000001 INSULIN DETEMIR PER 5 UNITS: Performed by: PHYSICIAN ASSISTANT

## 2019-12-07 PROCEDURE — 96361 HYDRATE IV INFUSION ADD-ON: CPT

## 2019-12-07 PROCEDURE — 96372 THER/PROPH/DIAG INJ SC/IM: CPT

## 2019-12-07 PROCEDURE — 63710000001 INSULIN LISPRO (HUMAN) PER 5 UNITS: Performed by: PHYSICIAN ASSISTANT

## 2019-12-07 PROCEDURE — G0378 HOSPITAL OBSERVATION PER HR: HCPCS

## 2019-12-07 PROCEDURE — 82962 GLUCOSE BLOOD TEST: CPT

## 2019-12-07 PROCEDURE — 99236 HOSP IP/OBS SAME DATE HI 85: CPT | Performed by: PHYSICIAN ASSISTANT

## 2019-12-07 PROCEDURE — 96375 TX/PRO/DX INJ NEW DRUG ADDON: CPT

## 2019-12-07 RX ORDER — NICOTINE POLACRILEX 4 MG
15 LOZENGE BUCCAL
Status: DISCONTINUED | OUTPATIENT
Start: 2019-12-07 | End: 2019-12-07 | Stop reason: HOSPADM

## 2019-12-07 RX ORDER — CARVEDILOL 12.5 MG/1
12.5 TABLET ORAL 2 TIMES DAILY WITH MEALS
Status: DISCONTINUED | OUTPATIENT
Start: 2019-12-07 | End: 2019-12-07 | Stop reason: HOSPADM

## 2019-12-07 RX ORDER — ONDANSETRON 2 MG/ML
4 INJECTION INTRAMUSCULAR; INTRAVENOUS EVERY 6 HOURS PRN
Status: DISCONTINUED | OUTPATIENT
Start: 2019-12-07 | End: 2019-12-07 | Stop reason: HOSPADM

## 2019-12-07 RX ORDER — INSULIN GLARGINE 100 [IU]/ML
25 INJECTION, SOLUTION SUBCUTANEOUS DAILY
Qty: 7 PEN | Refills: 5
Start: 2019-12-07 | End: 2020-04-22 | Stop reason: SDUPTHER

## 2019-12-07 RX ORDER — ACETAMINOPHEN 650 MG/1
650 SUPPOSITORY RECTAL EVERY 4 HOURS PRN
Status: DISCONTINUED | OUTPATIENT
Start: 2019-12-07 | End: 2019-12-07 | Stop reason: HOSPADM

## 2019-12-07 RX ORDER — ACETAMINOPHEN 325 MG/1
650 TABLET ORAL EVERY 4 HOURS PRN
Status: DISCONTINUED | OUTPATIENT
Start: 2019-12-07 | End: 2019-12-07 | Stop reason: HOSPADM

## 2019-12-07 RX ORDER — MORPHINE SULFATE 2 MG/ML
2 INJECTION, SOLUTION INTRAMUSCULAR; INTRAVENOUS ONCE
Status: COMPLETED | OUTPATIENT
Start: 2019-12-07 | End: 2019-12-07

## 2019-12-07 RX ORDER — SODIUM CHLORIDE 9 MG/ML
100 INJECTION, SOLUTION INTRAVENOUS CONTINUOUS
Status: DISCONTINUED | OUTPATIENT
Start: 2019-12-07 | End: 2019-12-07 | Stop reason: HOSPADM

## 2019-12-07 RX ORDER — SODIUM CHLORIDE 0.9 % (FLUSH) 0.9 %
10 SYRINGE (ML) INJECTION AS NEEDED
Status: DISCONTINUED | OUTPATIENT
Start: 2019-12-07 | End: 2019-12-07 | Stop reason: HOSPADM

## 2019-12-07 RX ORDER — DEXTROSE MONOHYDRATE 25 G/50ML
25 INJECTION, SOLUTION INTRAVENOUS
Status: DISCONTINUED | OUTPATIENT
Start: 2019-12-07 | End: 2019-12-07 | Stop reason: HOSPADM

## 2019-12-07 RX ORDER — ASPIRIN 81 MG/1
81 TABLET ORAL DAILY
Status: DISCONTINUED | OUTPATIENT
Start: 2019-12-07 | End: 2019-12-07 | Stop reason: HOSPADM

## 2019-12-07 RX ORDER — ONDANSETRON 4 MG/1
4 TABLET, FILM COATED ORAL EVERY 6 HOURS PRN
Status: DISCONTINUED | OUTPATIENT
Start: 2019-12-07 | End: 2019-12-07 | Stop reason: HOSPADM

## 2019-12-07 RX ORDER — MORPHINE SULFATE 2 MG/ML
1 INJECTION, SOLUTION INTRAMUSCULAR; INTRAVENOUS EVERY 4 HOURS PRN
Status: DISCONTINUED | OUTPATIENT
Start: 2019-12-07 | End: 2019-12-07 | Stop reason: HOSPADM

## 2019-12-07 RX ORDER — SODIUM CHLORIDE 0.9 % (FLUSH) 0.9 %
10 SYRINGE (ML) INJECTION EVERY 12 HOURS SCHEDULED
Status: DISCONTINUED | OUTPATIENT
Start: 2019-12-07 | End: 2019-12-07 | Stop reason: HOSPADM

## 2019-12-07 RX ORDER — AMLODIPINE BESYLATE 10 MG/1
10 TABLET ORAL DAILY
Status: DISCONTINUED | OUTPATIENT
Start: 2019-12-07 | End: 2019-12-07 | Stop reason: HOSPADM

## 2019-12-07 RX ORDER — CLOPIDOGREL BISULFATE 75 MG/1
75 TABLET ORAL DAILY
Status: DISCONTINUED | OUTPATIENT
Start: 2019-12-07 | End: 2019-12-07 | Stop reason: HOSPADM

## 2019-12-07 RX ORDER — ACETAMINOPHEN 160 MG/5ML
650 SOLUTION ORAL EVERY 4 HOURS PRN
Status: DISCONTINUED | OUTPATIENT
Start: 2019-12-07 | End: 2019-12-07 | Stop reason: HOSPADM

## 2019-12-07 RX ORDER — ATORVASTATIN CALCIUM 10 MG/1
10 TABLET, FILM COATED ORAL DAILY
Status: DISCONTINUED | OUTPATIENT
Start: 2019-12-07 | End: 2019-12-07

## 2019-12-07 RX ORDER — HEPARIN SODIUM 5000 [USP'U]/ML
5000 INJECTION, SOLUTION INTRAVENOUS; SUBCUTANEOUS EVERY 8 HOURS SCHEDULED
Status: DISCONTINUED | OUTPATIENT
Start: 2019-12-07 | End: 2019-12-07 | Stop reason: HOSPADM

## 2019-12-07 RX ADMIN — AMLODIPINE BESYLATE 10 MG: 10 TABLET ORAL at 08:22

## 2019-12-07 RX ADMIN — SODIUM CHLORIDE 100 ML/HR: 9 INJECTION, SOLUTION INTRAVENOUS at 05:33

## 2019-12-07 RX ADMIN — MORPHINE SULFATE 2 MG: 2 INJECTION, SOLUTION INTRAMUSCULAR; INTRAVENOUS at 00:56

## 2019-12-07 RX ADMIN — Medication 1 MG: at 02:10

## 2019-12-07 RX ADMIN — CARVEDILOL 12.5 MG: 12.5 TABLET, FILM COATED ORAL at 08:22

## 2019-12-07 RX ADMIN — HYDROMORPHONE HYDROCHLORIDE 1 MG: 1 INJECTION, SOLUTION INTRAMUSCULAR; INTRAVENOUS; SUBCUTANEOUS at 02:10

## 2019-12-07 RX ADMIN — SODIUM CHLORIDE, PRESERVATIVE FREE 10 ML: 5 INJECTION INTRAVENOUS at 08:22

## 2019-12-07 RX ADMIN — CEFTRIAXONE 2 G: 2 INJECTION, POWDER, FOR SOLUTION INTRAMUSCULAR; INTRAVENOUS at 03:00

## 2019-12-07 RX ADMIN — CLOPIDOGREL BISULFATE 75 MG: 75 TABLET ORAL at 08:22

## 2019-12-07 RX ADMIN — INSULIN DETEMIR 25 UNITS: 100 INJECTION, SOLUTION SUBCUTANEOUS at 08:40

## 2019-12-07 RX ADMIN — HEPARIN SODIUM 5000 UNITS: 5000 INJECTION, SOLUTION INTRAVENOUS; SUBCUTANEOUS at 08:22

## 2019-12-07 RX ADMIN — ASPIRIN 81 MG: 81 TABLET, COATED ORAL at 08:22

## 2019-12-07 RX ADMIN — SODIUM CHLORIDE 1000 ML: 9 INJECTION, SOLUTION INTRAVENOUS at 00:17

## 2019-12-07 NOTE — DISCHARGE SUMMARY
[facility] Medicine Services  DISCHARGE SUMMARY    Patient Name: Gal Pierce  : 1955  MRN: [id number]    Date of Admission: 2019 11:21 PM  Date of Discharge:  2019  Primary Care Physician: Umer Tovar DO    Consults     No orders found for last 30 day(s).          Hospital Course     Presenting Problem:   Suprapubic abdominal pain [R10.2]    Active Hospital Problems    Diagnosis  POA   • Acute kidney injury (CMS/HCC) [N17.9]  Yes   • Flank pain [R10.9]  Yes   • Hyperlipidemia LDL goal <70 [E78.5]  Yes   • Essential hypertension [I10]  Yes   • Type 2 diabetes mellitus with hyperglycemia, with long-term current use of insulin (CMS/HCC) [E11.65, Z79.4]  Not Applicable   • Coronary artery disease involving coronary bypass graft of native heart [I25.810]  Yes      Resolved Hospital Problems    Diagnosis Date Resolved POA   • Suprapubic abdominal pain [R10.2] 2019 Yes          Hospital Course:  Gal Pierce is a 64 y.o. male with a medical history significant for CAD s/p CABG, HTN, HLD, T2DM, and nephrolithiasis presented to [facility] ED c/o bilateral flank and suprapubic pain for several hours. Scans demonstrated pyelonephritis w/ negative urinalysis, and kidney stones on the right.  Patient was given a dose of rocephin in the ED and urine culture pending.  Labs showed an elevated creatinine that has improved with IV fluids.  Vital signs are stable.  No pain since around midnight.  Overall patient is feeling much better and has been cleared for discharge home.  He is to see his PCP early this week to have labs drawn to evaluate kidney function.      Flank pain  Kidney stone on the right  --flank pain may be due to passage of kidney stone--has had no further pain since admission  --UA negative  --no further antibiotics at discharged--received rocephin in the ED  --follow up with PCP at the beginning of the week        Acute kidney  injury--improved  -- creatinine now down to 2.01 with IV fluids  -- continue to hold Losartan--defer to PCP to restart pending labs on Monday  -- will need bmp on Monday to evaluate kidney function      Type 2 diabetes mellitus   -- Glucose 116-168   -- Decrease Basal to 25 units daily defer to PCP to increase as needed  --continue Metformin    Mild enlargement of the infrarenal abdominal aorta measuring 2.6   --to be followed outpt by PCP     Disposition:  Discharge home today       Discharge Follow Up Recommendations for labs/diagnostics:  -Follow up with PCP at the beginning of the week-- will need labs to assess kidney function    Day of Discharge     HPI:   Patient awake and up walking around the room.  Slept well with no overnight issues.  Has not had any pain since last night.  Tolerating diet and voiding without difficulty.  Denies fever, soa, cp, flank pain, n/v/d, abdominal pain, dysuria, hematuria, or any other complaints at this time.      Review of Systems  Gen- No fevers, chills  CV- No chest pain, palpitations  Resp- No cough, dyspnea  GI- No N/V/D, abd pain       Otherwise ROS is negative except as mentioned in the HPI.    Vital Signs:   Temp:  [98 °F (36.7 °C)-98.7 °F (37.1 °C)] 98 °F (36.7 °C)  Heart Rate:  [89-92] 92  Resp:  [18] 18  BP: (167-179)/() 172/87     Physical Exam:  Constitutional: Awake, alert, up walking around the room  Eyes: PERRLA, sclerae anicteric, no conjunctival injection  HENT: NCAT, mucous membranes moist  Neck: Supple, no thyromegaly, no lymphadenopathy, trachea midline  Respiratory: Clear to auscultation bilaterally, nonlabored respirations   Cardiovascular: RRR, no murmurs, rubs, or gallops, palpable pedal pulses bilaterally  Gastrointestinal: Positive bowel sounds, soft, nontender, nondistended, no flank pain  Musculoskeletal: No bilateral ankle edema, no clubbing or cyanosis to extremities  Psychiatric: Appropriate affect, cooperative  Neurologic: Oriented x 3,  strength symmetric in all extremities, Cranial Nerves grossly intact to confrontation, speech clear  Skin: No rashes       Pertinent  and/or Most Recent Results     Results from last 7 days   Lab Units 12/07/19  0714 12/06/19  2246   WBC 10*3/mm3 10.81* 10.94*   HEMOGLOBIN g/dL 16.0 16.7   HEMATOCRIT % 49.7 50.9   PLATELETS 10*3/mm3 481* 498*   SODIUM mmol/L 139 139   POTASSIUM mmol/L 4.1 4.2   CHLORIDE mmol/L 102 103   CO2 mmol/L 20.0* 23.0   BUN mg/dL 25* 23   CREATININE mg/dL 2.01* 2.11*   GLUCOSE mg/dL 168* 116*   CALCIUM mg/dL 9.3 9.8     Results from last 7 days   Lab Units 12/06/19  2246   BILIRUBIN mg/dL 0.4   ALK PHOS U/L 50   ALT (SGPT) U/L 30   AST (SGOT) U/L 29           Invalid input(s): TG, LDLCALC, LDLREALC  Results from last 7 days   Lab Units 12/06/19  2347   LACTATE mmol/L 1.1       Brief Urine Lab Results  (Last result in the past 365 days)      Color   Clarity   Blood   Leuk Est   Nitrite   Protein   CREAT   Urine HCG        12/07/19 0000 Yellow Clear Negative Negative Negative Negative               Microbiology Results Abnormal     None          Imaging Results (All)     Procedure Component Value Units Date/Time    CT Abdomen Pelvis Without Contrast [[id number]] Collected:  12/07/19 0435     Updated:  12/07/19 0437    Narrative:       CT Abdomen Pelvis WO    INDICATION:   Lower abdomen pain and urinary complaints of low back pain. Suprapubic pain radiating to back for 6 hours    TECHNIQUE:   CT of the abdomen and pelvis without IV contrast. Coronal and sagittal reconstructions were obtained.  Radiation dose reduction techniques included automated exposure control or exposure modulation based on body size. Count of known CT and cardiac nuc  med studies performed in previous 12 months: 0.     COMPARISON:   None available.    FINDINGS:  Abdomen: Lung bases are clear. Liver, gallbladder, spleen, pancreas and adrenal glands are normal. Both kidneys show mild perinephric stranding and there are 2 tiny  "1 mm stones in the right kidney. There is mild enlargement of the infrarenal aorta  measuring 2.6 cm in diameter. No adenopathy is identified. The bowel is normal.    Pelvis: The bladder and prostate gland are normal. Bones are unremarkable.      Impression:       Bilateral stranding around the kidneys. This could be due to pyelonephritis and clinical correlation is recommended.    2 tiny nonobstructing stones right kidney    Mild enlargement of the infrarenal abdominal aorta measuring 2.6 m in diameter.          Signer Name: Alexis Yao MD   Signed: 12/7/2019 4:35 AM   Workstation Name: [id number]    [facility]                          Order Current Status    Urine Culture - Urine, Urine, Clean Catch In process        Discharge Details        Discharge Medications      Changes to Medications      Instructions Start Date   Insulin Glargine 100 UNIT/ML injection pen  Commonly known as:  VANIAGLASHIA FOLEY  What changed:  how much to take   25 Units, Subcutaneous, Daily         Continue These Medications      Instructions Start Date   amLODIPine 10 MG tablet  Commonly known as:  NORVASC   10 mg, Oral, Daily      aspirin 81 MG EC tablet   81 mg, Oral, Daily      carvedilol 12.5 MG tablet  Commonly known as:  COREG   12.5 mg, Oral, 2 Times Daily      CENTRUM SILVER PO   Oral      clopidogrel 75 MG tablet  Commonly known as:  PLAVIX   75 mg, Oral, Daily      metFORMIN 1000 MG tablet  Commonly known as:  GLUCOPHAGE   1,000 mg, Oral, 2 Times Daily With Meals         Stop These Medications    losartan 100 MG tablet  Commonly known as:  COZAAR            Allergies   Allergen Reactions   • Crestor [Rosuvastatin Calcium] Myalgia   • Hydrochlorothiazide Other (See Comments)     Pt states it makes his blood pressure \"real low\"   • Lipitor [Atorvastatin Calcium] Myalgia   • Penicillins Rash         Discharge Disposition:  Home or Self Care    Diet:  Hospital:  Diet Order   Procedures   • Diet Regular; " Cardiac, Consistent Carbohydrate       Activity:  Activity Instructions     Activity as Tolerated                 CODE STATUS:    Code Status and Medical Interventions:   Ordered at: 12/07/19 0350     Level Of Support Discussed With:    Patient     Code Status:    CPR     Medical Interventions (Level of Support Prior to Arrest):    Full       Future Appointments   Date Time Provider Department Center   12/10/2019 10:00 AM Paolo Faust IV, MD [facility] None   1/22/2020 11:00 AM Umer Tovar DO [facility] None       Additional Instructions for the Follow-ups that You Need to Schedule     Discharge Follow-up with PCP   As directed       Currently Documented PCP:    Umer Tovar DO    PCP Phone Number:    [phone]     Follow Up Details:  Monday or Tuesday               Time Spent on Discharge:  40 minutes    Electronically signed by CHERELLE Funez, 12/07/19, 10:24 AM.

## 2019-12-07 NOTE — PLAN OF CARE
Pt arrived from ED around 0500. Denies pain, feels groggy from dilaudid. No complaints at this time. NS at 100, possible renal ultrasound today.

## 2019-12-07 NOTE — H&P
[facility] Medicine Services  HISTORY AND PHYSICAL    Patient Name: Gal Pierce  : 1955  MRN: [id number]  Primary Care Physician: Umer Tovar DO  Date of admission: 2019      Subjective   Subjective     Chief Complaint:  Bilateral flank pain    HPI:  Gal Pierce is a 64 y.o. male with a medical history significant for CAD s/p CABG, HTN, HLD, T2DM, and nephrolithiasis presented to [facility] ED c/o bilateral flank and suprapubic pain for several hours. The patient reports sudden onset of 10 out of 10 pain radiating from his back bilaterally to his lower abdomen. The pain is constant and sharp in nature. He denies prior history of similar pain. He denies back injury or recent fall. No history of back pain. No urinary/bowel incontinence, weakness or numbness. He admits to 2 episodes of non-bloody diarrhea today. No fevers/chills, nausea/vomiting, SOB/CP, dizziness, or headache. No difficulty urinating, dysuria or urgency. He reports no recent antibiotics or UTI. Remote tobacco use. No alcohol or drug use. The patient lives at home with his wife.     While in the ED, his vitals were stable, blood pressure slightly elevated. Labs revealed WBC 10.94. CT of abdomen pelvis demonstrated bilateral perinephric stranding and renal calculi. He was given fluids, pain medication and started on Rocephin 2g in the ED. He will be admitted to the hospitalist service for further medical management.     Review of Systems   Constitutional: Negative for chills, diaphoresis, fatigue and fever.   HENT: Negative for congestion, sore throat and trouble swallowing.    Eyes: Negative for pain and visual disturbance.   Respiratory: Negative for cough, chest tightness, shortness of breath and wheezing.    Cardiovascular: Negative for chest pain, palpitations and leg swelling.   Gastrointestinal: Positive for abdominal pain (suprapubic) and diarrhea. Negative for blood in stool,  constipation, nausea and vomiting.   Genitourinary: Positive for flank pain (bilateral ). Negative for difficulty urinating, dysuria, hematuria and urgency.   Musculoskeletal: Positive for back pain. Negative for gait problem, myalgias and neck pain.   Skin: Negative for rash and wound.   Neurological: Negative for dizziness, syncope, speech difficulty, weakness, light-headedness, numbness and headaches.   Hematological: Negative for adenopathy. Does not bruise/bleed easily.   Psychiatric/Behavioral: Negative for agitation and confusion.      All other systems reviewed and are negative.     Personal History     Past Medical History:   Diagnosis Date   • CAD (coronary artery disease)    • Diabetes mellitus (CMS/HCC)    • Hyperlipidemia    • Hypertension        Past Surgical History:   Procedure Laterality Date   • APPENDECTOMY     • BYPASS GRAFT  10/2018    Triple Bypass       Family History: family history includes Aneurysm in his mother; Cancer in his sister; Diabetes in his brother, father, maternal grandfather, maternal grandmother, mother, paternal grandfather, paternal grandmother, sister, sister, and sister; Heart attack in his father; Heart disease in his brother; Hypertension in his brother, brother, father, maternal grandfather, maternal grandmother, paternal grandfather, and paternal grandmother; No Known Problems in his brother. Otherwise pertinent FHx was reviewed and unremarkable.     Social History:  reports that he has quit smoking. He has never used smokeless tobacco. He reports that he does not drink alcohol or use drugs.  Social History     Social History Narrative    Caffeine: 2-3 cups coffee daily, rare soda       Medications:  Available home medication information reviewed.    (Not in a hospital admission)    Allergies   Allergen Reactions   • Crestor [Rosuvastatin Calcium] Myalgia   • Hydrochlorothiazide Other (See Comments)   • Lipitor [Atorvastatin Calcium] Myalgia   • Penicillins Rash        Objective   Objective     Vital Signs:   Temp:  [98.5 °F (36.9 °C)] 98.5 °F (36.9 °C)  Heart Rate:  [89-91] 89  Resp:  [18] 18  BP: (167-179)/(88-90) 167/90        Physical Exam   Constitutional: lying in bed, falling asleep but interactive     Eyes: PERRLA, sclerae anicteric, no conjunctival injection  HENT: NCAT, mucous membranes moist  Neck: Supple, no thyromegaly, no lymphadenopathy, trachea midline  Respiratory: Anterior lung fields clear to auscultation bilaterally, nonlabored respirations   Cardiovascular: RRR, no murmurs appreciated, palpable pedal pulses bilaterally  Gastrointestinal: Positive bowel sounds, soft, suprapubic area tender to palpation, no distention or guarding   Musculoskeletal: No bilateral ankle edema, no clubbing or cyanosis to extremities, bilateral CVA tenderness   Psychiatric: Appropriate affect, cooperative  Neurologic: Oriented x 3, strength and sensation symmetric in all extremities, Cranial Nerves grossly intact to confrontation, speech clear  Skin: No rashes or wounds    Results Reviewed:  I have personally reviewed current lab and radiology data.    Results from last 7 days   Lab Units 12/06/19  2246   WBC 10*3/mm3 10.94*   HEMOGLOBIN g/dL 16.7   HEMATOCRIT % 50.9   PLATELETS 10*3/mm3 498*     Results from last 7 days   Lab Units 12/06/19  2347 12/06/19  2246   SODIUM mmol/L  --  139   POTASSIUM mmol/L  --  4.2   CHLORIDE mmol/L  --  103   CO2 mmol/L  --  23.0   BUN mg/dL  --  23   CREATININE mg/dL  --  2.11*   GLUCOSE mg/dL  --  116*   CALCIUM mg/dL  --  9.8   ALT (SGPT) U/L  --  30   AST (SGOT) U/L  --  29   LACTATE mmol/L 1.1  --      Estimated Creatinine Clearance: 38.8 mL/min (A) (by C-G formula based on SCr of 2.11 mg/dL (H)).  Brief Urine Lab Results  (Last result in the past 365 days)      Color   Clarity   Blood   Leuk Est   Nitrite   Protein   CREAT   Urine HCG        12/07/19 0000 Yellow Clear Negative Negative Negative Negative             Imaging Results (Last  24 Hours)     ** No results found for the last 24 hours. **             Assessment/Plan   Assessment / Plan     Active Hospital Problems    Diagnosis POA   • **Pyelonephritis [N12] Yes   • Acute kidney injury (CMS/[facility]) [N17.9] Yes   • Hyperlipidemia LDL goal <70 [E78.5] Yes   • Essential hypertension [I10] Yes   • Type 2 diabetes mellitus with hyperglycemia, with long-term current use of insulin (CMS/[facility]) [E11.65, Z79.4] Not Applicable   • Coronary artery disease involving coronary bypass graft of native heart [I25.810] Yes     · NSTEMI in 2002 with drug-eluting stent to the LAD  · NSTEMI 2004 with drug-eluting stent to the left circumflex   · Cardiac catherization (10/26/2018): showed severe three-vessel coronary artery disease with significant stenosis of the left anterior descending, left circumflex flex artery and right coronary artery. LVEF normal.  · CABG (10/29/2018): CABG x4. Left internal mammary artery graft to LAD, saphenous vein graft to right coronary, sequential saphenous vein graft to first circumflex marginal and posterolateral branch of the circumflex.   · Echocardiogram (10/26/2018): LVEF normal. Mild LVH. Mild MR. Trace TR.       Mr. Pierce is a 65 y/o males with a medical history significant for CAD s/p CABG, HTN, HLD, T2DM, and nephrolithiasis presented to [facility] ED c/o bilateral flank and suprapubic pain for several hours. Scans demonstrated pyelonephritis w/ negative urinalysis.     1. Pyelonephritis   -- bilateral perinephric stranding and renal calculi demonstrated on CT A/P despite negative urinalysis   -- blood culture x2   -- treat with 2g of Rocephin   -- 1mg morphine q4h PRN   -- fluids   -- monitor for fever; Tylenol as needed   -- morning labs   -- strict I&Os     2. Acute kidney injury   -- creatinine 2.11 (up from 1.10 on 10/4/19)   -- fluids   -- hold Losartan   -- monitor chemistry     3. Coronary artery disease s/p CABG   -- continue aspirin + Plavix     4. Hypertension   --  continue Amlodipine + Carvedilol   -- hold Losartan due to LORNA    5. Hyperlipidemia   -- Crestor and Lipitor intolerance  -- patient supplied pravastatin      6. Type 2 diabetes mellitus   -- hold metformin   -- SSI with 25 units of basal in the morning      DVT prophylaxis:  [facility]    CODE STATUS:    Code Status and Medical Interventions:   Ordered at: 12/07/19 0350     Level Of Support Discussed With:    Patient     Code Status:    CPR     Medical Interventions (Level of Support Prior to Arrest):    Full     Admission Status:  I believe this patient meets OBSERVATION status, however if further evaluation or treatment plans warrant, status may change.  Based upon current information, I predict patient's care encounter to be less than or equal to 2 midnights.    Electronically signed by Patricia Kessler PA-C, 12/07/19, 3:35 AM.

## 2019-12-07 NOTE — ED PROVIDER NOTES
Subjective   Mr. Gal Pierce is a 64 y.o male presenting to the emergency department with complaints of lower abdominal pain. He complains of throbbing suprapubic abdominal that began 5 hours ago as well as diffuse lower back pain all across the low back and diarrhea x 1 episode today. He has a history of a kidney infection and states this feels similar. He also has a history of kidney stones and states this pain does not feel similar to that. He denies any nausea/vomiting, fever, chills, history of BPH, urinary frequency, hematuria, dysuria, chest pain, and shortness of breath. He had a quadruple bypass in October 2018.  Patient has never had prostatitis.  Previous abdominal surgeries include appendectomy.  There are no other acute symptoms at this time.      History provided by:  Patient  Abdominal Pain   Pain location:  Suprapubic  Pain quality: throbbing    Pain radiates to:  Back  Onset quality:  Sudden  Duration:  5 hours  Timing:  Constant  Progression:  Worsening  Chronicity:  New  Relieved by:  None tried  Worsened by:  Nothing  Ineffective treatments:  None tried  Associated symptoms: diarrhea (X1 episode today)    Associated symptoms: no chest pain, no chills, no constipation, no dysuria, no fever, no hematuria, no nausea, no shortness of breath and no vomiting        Review of Systems   Constitutional: Negative for appetite change, chills and fever.   Respiratory: Negative for shortness of breath.    Cardiovascular: Negative for chest pain.   Gastrointestinal: Positive for abdominal pain (Lower abdominal pain described as throbbing and aching) and diarrhea (X1 episode today). Negative for blood in stool, constipation, nausea, rectal pain and vomiting.   Genitourinary: Negative for dysuria, flank pain, frequency, hematuria and urgency.        No enlarged prostate   Musculoskeletal: Positive for back pain (Pain all across low back.  No particular CVA tenderness).   All other systems reviewed and are  negative.      Past Medical History:   Diagnosis Date   • CAD (coronary artery disease)    • Diabetes mellitus (CMS/HCC)    • Hyperlipidemia    • Hypertension        Allergies   Allergen Reactions   • Crestor [Rosuvastatin Calcium] Myalgia   • Hydrochlorothiazide Other (See Comments)   • Lipitor [Atorvastatin Calcium] Myalgia   • Penicillins Rash       Past Surgical History:   Procedure Laterality Date   • APPENDECTOMY     • BYPASS GRAFT  10/2018    Triple Bypass       Family History   Problem Relation Age of Onset   • Diabetes Mother    • Aneurysm Mother    • Hypertension Father    • Diabetes Father    • Heart attack Father    • Diabetes Sister    • No Known Problems Brother    • Diabetes Maternal Grandmother    • Hypertension Maternal Grandmother    • Diabetes Maternal Grandfather    • Hypertension Maternal Grandfather    • Diabetes Paternal Grandmother    • Hypertension Paternal Grandmother    • Diabetes Paternal Grandfather    • Hypertension Paternal Grandfather    • Diabetes Sister    • Diabetes Sister    • Cancer Sister    • Hypertension Brother    • Heart disease Brother    • Diabetes Brother    • Hypertension Brother        Social History     Socioeconomic History   • Marital status:      Spouse name: Not on file   • Number of children: Not on file   • Years of education: Not on file   • Highest education level: Not on file   Tobacco Use   • Smoking status: Former Smoker   • Smokeless tobacco: Never Used   Substance and Sexual Activity   • Alcohol use: No     Frequency: Never   • Drug use: No   • Sexual activity: Defer   Social History Narrative    Caffeine: 2-3 cups coffee daily, rare soda         Objective   Physical Exam   Constitutional: He is oriented to person, place, and time. He appears well-developed and well-nourished. No distress.   Patient is resting comfortably.    HENT:   Head: Normocephalic and atraumatic.   Eyes: Conjunctivae are normal. No scleral icterus.   Neck: Normal range of  motion.   Cardiovascular: Normal rate, regular rhythm and normal heart sounds.   No murmur heard.  Pulmonary/Chest: Effort normal and breath sounds normal. No respiratory distress. He has no wheezes.   Abdominal: Soft. There is tenderness.   Tenderness to palpation bilateral suprapubic. No flank or CVA tenderness. Abdominal exam is benign and nonsurgical   Neurological: He is alert and oriented to person, place, and time.   Skin: Skin is warm and dry. He is not diaphoretic.   Psychiatric: He has a normal mood and affect. His behavior is normal.   Nursing note and vitals reviewed.      Procedures         ED Course  ED Course as of Dec 07 0416   Sat Dec 07, 2019   0410 CBC was within normal limits.  There was no leukocytosis.  White blood cell count was 10.94.  Chemistries reveal elevated creatinine at 2.11.  Creatinine approximately 2 months ago was normal at 1.10.  Urinalysis shows no microscopic hematuria and no acute infectious process.  Lactic acid is normal at 1.1.  Lipase was 27 and LFTs were completely within normal limits.  CT scan of the abdomen and pelvis with contrast reveals mild inflammation and stranding around each kidney.  Patient has 2 nonobstructing stones in the right kidney, possible pyelonephritis.  Bladder appeared within normal limits.  Otherwise CT scan was negative.    [FC]   0410 Comprehensive Metabolic Panel(!) [FC]   0411 I reevaluated the patient and updated he and spouse on all normal results.  According to nurse, patient had approximately 700 mL of urine output out.  Bladder scan was performed post residual and there was no urine left in the bladder.  Patient still having significant suprapubic discomfort.  With acute kidney injury and findings of stranding on CT scan, recommend overnight observation for IV fluid resuscitation, repeat creatinine in the morning, and possible renal ultrasound.  We also initiated Rocephin 2 g IV.  I discussed the case with Dr. Bucio, hospitalist, and she  is agreeable to admission on telemetry.    [FC]      ED Course User Index  [FC] Nan Rodgers, NATI       Recent Results (from the past 24 hour(s))   Comprehensive Metabolic Panel    Collection Time: 12/06/19 10:46 PM   Result Value Ref Range    Glucose 116 (H) 65 - 99 mg/dL    BUN 23 8 - 23 mg/dL    Creatinine 2.11 (H) 0.76 - 1.27 mg/dL    Sodium 139 136 - 145 mmol/L    Potassium 4.2 3.5 - 5.2 mmol/L    Chloride 103 98 - 107 mmol/L    CO2 23.0 22.0 - 29.0 mmol/L    Calcium 9.8 8.6 - 10.5 mg/dL    Total Protein 7.9 6.0 - 8.5 g/dL    Albumin 4.20 3.50 - 5.20 g/dL    ALT (SGPT) 30 1 - 41 U/L    AST (SGOT) 29 1 - 40 U/L    Alkaline Phosphatase 50 39 - 117 U/L    Total Bilirubin 0.4 0.2 - 1.2 mg/dL    eGFR  African Amer 39 (L) >60 mL/min/1.73    Globulin 3.7 gm/dL    A/G Ratio 1.1 g/dL    BUN/Creatinine Ratio 10.9 7.0 - 25.0    Anion Gap 13.0 5.0 - 15.0 mmol/L   Lipase    Collection Time: 12/06/19 10:46 PM   Result Value Ref Range    Lipase 27 13 - 60 U/L   Light Blue Top    Collection Time: 12/06/19 10:46 PM   Result Value Ref Range    Extra Tube hold for add-on    Green Top (Gel)    Collection Time: 12/06/19 10:46 PM   Result Value Ref Range    Extra Tube Hold for add-ons.    Lavender Top    Collection Time: 12/06/19 10:46 PM   Result Value Ref Range    Extra Tube hold for add-on    Gold Top - SST    Collection Time: 12/06/19 10:46 PM   Result Value Ref Range    Extra Tube Hold for add-ons.    CBC Auto Differential    Collection Time: 12/06/19 10:46 PM   Result Value Ref Range    WBC 10.94 (H) 3.40 - 10.80 10*3/mm3    RBC 5.72 4.14 - 5.80 10*6/mm3    Hemoglobin 16.7 13.0 - 17.7 g/dL    Hematocrit 50.9 37.5 - 51.0 %    MCV 89.0 79.0 - 97.0 fL    MCH 29.2 26.6 - 33.0 pg    MCHC 32.8 31.5 - 35.7 g/dL    RDW 16.5 (H) 12.3 - 15.4 %    RDW-SD 52.7 37.0 - 54.0 fl    MPV 9.4 6.0 - 12.0 fL    Platelets 498 (H) 140 - 450 10*3/mm3    Neutrophil % 69.6 42.7 - 76.0 %    Lymphocyte % 16.9 (L) 19.6 - 45.3 %    Monocyte % 10.2 5.0 -  "12.0 %    Eosinophil % 2.3 0.3 - 6.2 %    Basophil % 0.7 0.0 - 1.5 %    Immature Grans % 0.3 0.0 - 0.5 %    Neutrophils, Absolute 7.61 (H) 1.70 - 7.00 10*3/mm3    Lymphocytes, Absolute 1.85 0.70 - 3.10 10*3/mm3    Monocytes, Absolute 1.12 (H) 0.10 - 0.90 10*3/mm3    Eosinophils, Absolute 0.25 0.00 - 0.40 10*3/mm3    Basophils, Absolute 0.08 0.00 - 0.20 10*3/mm3    Immature Grans, Absolute 0.03 0.00 - 0.05 10*3/mm3    nRBC 0.0 0.0 - 0.2 /100 WBC   Lactic Acid, Plasma    Collection Time: 12/06/19 11:47 PM   Result Value Ref Range    Lactate 1.1 0.5 - 2.0 mmol/L   Urinalysis With Microscopic If Indicated (No Culture) - Urine, Clean Catch    Collection Time: 12/07/19 12:00 AM   Result Value Ref Range    Color, UA Yellow Yellow, Straw    Appearance, UA Clear Clear    pH, UA 7.0 5.0 - 8.0    Specific Gravity, UA 1.008 1.001 - 1.030    Glucose, UA Negative Negative    Ketones, UA Negative Negative    Bilirubin, UA Negative Negative    Blood, UA Negative Negative    Protein, UA Negative Negative    Leuk Esterase, UA Negative Negative    Nitrite, UA Negative Negative    Urobilinogen, UA 0.2 E.U./dL 0.2 - 1.0 E.U./dL     Note: In addition to lab results from this visit, the labs listed above may include labs taken at another facility or during a different encounter within the last 24 hours. Please correlate lab times with ED admission and discharge times for further clarification of the services performed during this visit.    CT Abdomen Pelvis Without Contrast    (Results Pending)     Vitals:    12/06/19 2234 12/06/19 2346 12/06/19 2354   BP: 179/88 167/90    BP Location: Left arm     Patient Position: Sitting     Pulse: 91  89   Resp: 18     Temp: 98.5 °F (36.9 °C)     TempSrc: Oral     SpO2: 93% 95%    Weight: 86.2 kg (190 lb)     Height: 172.7 cm (68\")       Medications   sodium chloride 0.9 % flush 10 mL (has no administration in time range)   HYDROmorphone (DILAUDID) 1 MG/ML injection  - ADS Override Pull (has no " administration in time range)   cefTRIAXone (ROCEPHIN) 2 g/100 mL 0.9% NS VTB (JEAN CLAUDE) (has no administration in time range)   sodium chloride 0.9 % bolus 1,000 mL (1,000 mL Intravenous New Bag 12/7/19 0017)   morphine injection 2 mg (2 mg Intravenous Given 12/7/19 0056)     ECG/EMG Results (last 24 hours)     ** No results found for the last 24 hours. **        ECG 12 Lead    (Results Pending)                     MDM    Final diagnoses:   Suprapubic abdominal pain   Kidney inflammation with edema   Acute kidney injury (CMS/HCC)   Right nephrolithiasis   History of diabetes mellitus   History of coronary artery disease       Documentation assistance provided by tierra Hayward.  Information recorded by the scribe was done at my direction and has been verified and validated by me.     Fiorella Hayward  12/07/19 0030       Nan Rodgers PA-C  12/07/19 [id number]

## 2019-12-08 LAB — BACTERIA SPEC AEROBE CULT: NORMAL

## 2019-12-09 ENCOUNTER — TRANSITIONAL CARE MANAGEMENT TELEPHONE ENCOUNTER (OUTPATIENT)
Dept: FAMILY MEDICINE CLINIC | Facility: CLINIC | Age: 64
End: 2019-12-09

## 2019-12-10 ENCOUNTER — CONSULT (OUTPATIENT)
Dept: CARDIOLOGY | Facility: CLINIC | Age: 64
End: 2019-12-10

## 2019-12-10 ENCOUNTER — LAB (OUTPATIENT)
Dept: LAB | Facility: HOSPITAL | Age: 64
End: 2019-12-10

## 2019-12-10 VITALS
WEIGHT: 196 LBS | DIASTOLIC BLOOD PRESSURE: 80 MMHG | HEIGHT: 67 IN | BODY MASS INDEX: 30.76 KG/M2 | SYSTOLIC BLOOD PRESSURE: 138 MMHG | HEART RATE: 85 BPM | OXYGEN SATURATION: 96 %

## 2019-12-10 DIAGNOSIS — I10 ESSENTIAL HYPERTENSION: ICD-10-CM

## 2019-12-10 DIAGNOSIS — Z79.4 TYPE 2 DIABETES MELLITUS WITH HYPERGLYCEMIA, WITH LONG-TERM CURRENT USE OF INSULIN (HCC): ICD-10-CM

## 2019-12-10 DIAGNOSIS — E11.65 TYPE 2 DIABETES MELLITUS WITH HYPERGLYCEMIA, WITH LONG-TERM CURRENT USE OF INSULIN (HCC): ICD-10-CM

## 2019-12-10 DIAGNOSIS — I25.119 CORONARY ARTERY DISEASE INVOLVING NATIVE CORONARY ARTERY OF NATIVE HEART WITH ANGINA PECTORIS (HCC): Primary | ICD-10-CM

## 2019-12-10 DIAGNOSIS — N17.9 ACUTE KIDNEY INJURY (HCC): ICD-10-CM

## 2019-12-10 DIAGNOSIS — E78.5 HYPERLIPIDEMIA LDL GOAL <70: ICD-10-CM

## 2019-12-10 PROBLEM — R10.9 FLANK PAIN: Status: RESOLVED | Noted: 2019-12-07 | Resolved: 2019-12-10

## 2019-12-10 PROBLEM — N12 PYELONEPHRITIS: Status: RESOLVED | Noted: 2019-12-07 | Resolved: 2019-12-10

## 2019-12-10 PROBLEM — M79.89 ARM SWELLING: Status: RESOLVED | Noted: 2019-12-06 | Resolved: 2019-12-10

## 2019-12-10 LAB
ANION GAP SERPL CALCULATED.3IONS-SCNC: 12.6 MMOL/L (ref 5–15)
ARTICHOKE IGE QN: 144 MG/DL (ref 0–100)
BUN BLD-MCNC: 13 MG/DL (ref 8–23)
BUN/CREAT SERPL: 13.1 (ref 7–25)
CALCIUM SPEC-SCNC: 9.5 MG/DL (ref 8.6–10.5)
CHLORIDE SERPL-SCNC: 103 MMOL/L (ref 98–107)
CO2 SERPL-SCNC: 27.4 MMOL/L (ref 22–29)
CREAT BLD-MCNC: 0.99 MG/DL (ref 0.76–1.27)
GFR SERPL CREATININE-BSD FRML MDRD: 93 ML/MIN/1.73
GLUCOSE BLD-MCNC: 128 MG/DL (ref 65–99)
POTASSIUM BLD-SCNC: 4.2 MMOL/L (ref 3.5–5.2)
SODIUM BLD-SCNC: 143 MMOL/L (ref 136–145)

## 2019-12-10 PROCEDURE — 99244 OFF/OP CNSLTJ NEW/EST MOD 40: CPT | Performed by: INTERNAL MEDICINE

## 2019-12-10 PROCEDURE — 83721 ASSAY OF BLOOD LIPOPROTEIN: CPT

## 2019-12-10 PROCEDURE — 36415 COLL VENOUS BLD VENIPUNCTURE: CPT

## 2019-12-10 PROCEDURE — 80048 BASIC METABOLIC PNL TOTAL CA: CPT

## 2019-12-10 RX ORDER — ASPIRIN 81 MG/1
81 TABLET ORAL DAILY
Qty: 90 TABLET | Refills: 3 | Status: SHIPPED | OUTPATIENT
Start: 2019-12-10

## 2019-12-10 RX ORDER — CLOPIDOGREL BISULFATE 75 MG/1
75 TABLET ORAL DAILY
Qty: 90 TABLET | Refills: 3 | Status: SHIPPED | OUTPATIENT
Start: 2019-12-10 | End: 2020-04-22 | Stop reason: SDUPTHER

## 2019-12-10 RX ORDER — ROSUVASTATIN CALCIUM 10 MG/1
10 TABLET, COATED ORAL NIGHTLY
Qty: 90 TABLET | Refills: 0 | Status: SHIPPED | OUTPATIENT
Start: 2019-12-10 | End: 2020-07-07 | Stop reason: SDUPTHER

## 2019-12-10 RX ORDER — AMLODIPINE BESYLATE 10 MG/1
10 TABLET ORAL DAILY
Qty: 90 TABLET | Refills: 3 | Status: SHIPPED | OUTPATIENT
Start: 2019-12-10 | End: 2020-04-22 | Stop reason: SDUPTHER

## 2019-12-10 RX ORDER — CARVEDILOL 12.5 MG/1
12.5 TABLET ORAL 2 TIMES DAILY
Qty: 60 TABLET | Refills: 3 | Status: SHIPPED | OUTPATIENT
Start: 2019-12-10 | End: 2020-03-27

## 2019-12-10 NOTE — ASSESSMENT & PLAN NOTE
· Losartan recently held due to LORNA  · Repeat BMP  · If creatinine improves, resume losartan 100 mg

## 2019-12-10 NOTE — PROGRESS NOTES
"[facility] at [facility]  Cardiology Consultation Note     Gal Pierce  1955  Requesting Provider: GHANSHYAM Rodriguez*  PCP: Umer Tovar DO    ID:  Gal Pierce is a 64 y.o. male seen for a consultation visit regarding the following:     Chief Complaint   Patient presents with   • Coronary Artery Disease     Consult               Dear Annabelle:    Thank you for referring Gal Pierce to my office to establish care of his coronary artery disease and risk factors.  The patient is a 64-year-old gentleman with a history of multiple previous non-STEMI's who is been previously treated at the [facility].  1 year ago, the patient sustained a non-STEMI and was found to have multivessel disease and underwent bypass surgery with Dr. Osman Slater.  The patient was \"slow\" paying his bills at the [facility] and he was discharged from the practice.  He has establish care with [facility].  The patient denies any anginal symptoms since his bypass surgery.  He was recently admitted to [facility] with kidney stones and acute kidney injury.  The patient was previously on losartan which was stopped.  He is still taking metformin despite creatinine of 2.  The patient reports being physically active with walking and riding his bike in the summer.  He also does a fair bit of fishing.  He does not smoke.  Patient denies palpitations, syncope, TIA, or stroke symptoms.          Past Medical History, Past Surgical History, Family history, Social History, and Medications were all reviewed with the patient today and updated as necessary.       Current Outpatient Medications:   •  amLODIPine (NORVASC) 10 MG tablet, Take 1 tablet by mouth Daily., Disp: 90 tablet, Rfl: 3  •  aspirin 81 MG EC tablet, Take 1 tablet by mouth Daily., Disp: 90 tablet, Rfl: 3  •  carvedilol (COREG) 12.5 MG tablet, Take 1 tablet by mouth 2 (Two) Times a Day., Disp: 60 tablet, Rfl: 3  •  " "clopidogrel (PLAVIX) 75 MG tablet, Take 1 tablet by mouth Daily., Disp: 90 tablet, Rfl: 3  •  Insulin Glargine (BASAGLAR KWIKPEN) 100 UNIT/ML injection pen, Inject 25 Units under the skin into the appropriate area as directed Daily. (Patient taking differently: Inject 100 Units under the skin into the appropriate area as directed Daily.), Disp: 7 pen, Rfl: 5  •  metFORMIN (GLUCOPHAGE) 1000 MG tablet, Take 1 tablet by mouth 2 (Two) Times a Day With Meals., Disp: 120 tablet, Rfl: 5  •  Multiple Vitamins-Minerals (CENTRUM SILVER PO), Take  by mouth Daily., Disp: , Rfl:   •  rosuvastatin (CRESTOR) 10 MG tablet, Take 1 tablet by mouth Every Night., Disp: 90 tablet, Rfl: 0    Allergies   Allergen Reactions   • Crestor [Rosuvastatin Calcium] Myalgia   • Hydrochlorothiazide Other (See Comments)     Pt states it makes his blood pressure \"real low\"   • Lipitor [Atorvastatin Calcium] Myalgia   • Penicillins Rash         Past Medical History:   Diagnosis Date   • CAD (coronary artery disease)    • Diabetes mellitus (CMS/HCC)    • Hyperlipidemia    • Hypertension      Past Surgical History:   Procedure Laterality Date   • APPENDECTOMY     • CORONARY ARTERY BYPASS GRAFT  10/29/2018    By Osman Slater with LIMA to LAD, SVG to RCA, sequential SVG to OM/LPL     Family History   Problem Relation Age of Onset   • Diabetes Mother    • Aneurysm Mother    • Hypertension Father    • Diabetes Father    • Heart attack Father    • Diabetes Sister    • No Known Problems Brother    • Diabetes Maternal Grandmother    • Hypertension Maternal Grandmother    • Diabetes Maternal Grandfather    • Hypertension Maternal Grandfather    • Diabetes Paternal Grandmother    • Hypertension Paternal Grandmother    • Diabetes Paternal Grandfather    • Hypertension Paternal Grandfather    • Diabetes Sister    • Diabetes Sister    • Cancer Sister    • Hypertension Brother    • Heart disease Brother    • Diabetes Brother    • Hypertension Brother      Social " "History     Tobacco Use   • Smoking status: Former Smoker   • Smokeless tobacco: Never Used   • Tobacco comment: 40 years ago   Substance Use Topics   • Alcohol use: No     Frequency: Never       Review of Systems   Constitution: Negative for malaise/fatigue.   Eyes: Negative for vision loss in left eye and vision loss in right eye.   Cardiovascular: Negative for chest pain, dyspnea on exertion, near-syncope, orthopnea, palpitations, paroxysmal nocturnal dyspnea and syncope.   Musculoskeletal: Negative for myalgias.   Neurological: Negative for brief paralysis, excessive daytime sleepiness, focal weakness, numbness, paresthesias and weakness.   All other systems reviewed and are negative.              /80 (BP Location: Right arm, Patient Position: Sitting)   Pulse 85   Ht 170.2 cm (67\")   Wt 88.9 kg (196 lb)   SpO2 96%   BMI 30.70 kg/m²        Physical Exam   Constitutional: He is oriented to person, place, and time. He appears well-developed and well-nourished.   HENT:   Head: Normocephalic and atraumatic.   Eyes: Pupils are equal, round, and reactive to light. No scleral icterus.   Neck: No JVD present. Carotid bruit is not present. No thyromegaly present.   Cardiovascular: Normal rate and regular rhythm. Exam reveals no gallop.   No murmur heard.  Pulmonary/Chest: Effort normal and breath sounds normal.   Abdominal: Soft. He exhibits no distension. There is no hepatosplenomegaly.   Musculoskeletal: He exhibits no edema.   Neurological: He is alert and oriented to person, place, and time.   Skin: Skin is warm and dry.   Psychiatric: He has a normal mood and affect. His behavior is normal.         Procedures    Lab Results   Component Value Date    GLUCOSE 168 (H) 12/07/2019    CALCIUM 9.3 12/07/2019     12/07/2019    K 4.1 12/07/2019    CO2 20.0 (L) 12/07/2019     12/07/2019    BUN 25 (H) 12/07/2019    CREATININE 2.01 (H) 12/07/2019    EGFRIFAFRI 41 (L) 12/07/2019    BCR 12.4 12/07/2019    " ANIONGAP 17.0 (H) 12/07/2019      Lab Results   Component Value Date    HGBA1C 7.30 (H) 10/04/2019           Problem List Items Addressed This Visit        Cardiology Problems    Coronary artery disease involving native coronary artery of native heart with angina pectoris (CMS/[facility]) - Primary    Overview     · NSTEMI in 2002 with drug-eluting stent to the LAD  · NSTEMI 2004 with drug-eluting stent to the left circumflex.  · Echocardiogram (10/26/2018): LVEF normal. Mild LVH. Mild MR.   · Cardiac catheterization for NSTEMI  (10/26/2018): severe 3-vessel CAD.  LVEF normal.  · CABG (10/29/2018): LIMA to LAD, SVG to RCA, sequential SVG to OM and LPL         Current Assessment & Plan     · No anginal symptoms  · Continue that for now.  May consider discontinuing clopidogrel at future visit  · Continue beta-blocker   · Attempt statin therapy 1-2 times a week         Relevant Medications    carvedilol (COREG) 12.5 MG tablet    amLODIPine (NORVASC) 10 MG tablet    aspirin 81 MG EC tablet    clopidogrel (PLAVIX) 75 MG tablet    Essential hypertension    Current Assessment & Plan     · Losartan recently held due to LORNA  · Repeat BMP  · If creatinine improves, resume losartan 100 mg         Relevant Medications    carvedilol (COREG) 12.5 MG tablet    amLODIPine (NORVASC) 10 MG tablet    Other Relevant Orders    Basic Metabolic Panel    Hyperlipidemia LDL goal <70    Overview     · High intensity statin therapy indicated due to CAD  · Historically intolerant to rosuvastatin and atorvastatin         Current Assessment & Plan     · Recommend patient attempt rosuvastatin 10 mg 1-2 times weekly         Relevant Medications    rosuvastatin (CRESTOR) 10 MG tablet    Other Relevant Orders    LDL Cholesterol, Direct       Other    Type 2 diabetes mellitus with hyperglycemia, with long-term current use of insulin (CMS/[facility])    Current Assessment & Plan     · ARB and statin therapy indicated due to diabetic status         Acute kidney  injury (CMS/HCC)    Current Assessment & Plan     Repeat BMP today  If creatinine normal, resume losartan and metformin                      · Obtain BMP today  · If creatinine normalized, resume losartan and metformin  · Trial of Crestor 10 mg 1-2 times weekly  Return in about 6 months (around 6/10/2020).          KIKA Faust MD FAC, [facility]  Interventional and General Cardiology

## 2019-12-10 NOTE — ASSESSMENT & PLAN NOTE
· No anginal symptoms  · Continue that for now.  May consider discontinuing clopidogrel at future visit  · Continue beta-blocker   · Attempt statin therapy 1-2 times a week

## 2019-12-11 ENCOUNTER — OFFICE VISIT (OUTPATIENT)
Dept: FAMILY MEDICINE CLINIC | Facility: CLINIC | Age: 64
End: 2019-12-11

## 2019-12-11 VITALS
DIASTOLIC BLOOD PRESSURE: 82 MMHG | OXYGEN SATURATION: 97 % | HEART RATE: 81 BPM | BODY MASS INDEX: 30.76 KG/M2 | WEIGHT: 196 LBS | SYSTOLIC BLOOD PRESSURE: 148 MMHG | HEIGHT: 67 IN

## 2019-12-11 DIAGNOSIS — Z79.4 TYPE 2 DIABETES MELLITUS WITH OTHER SPECIFIED COMPLICATION, WITH LONG-TERM CURRENT USE OF INSULIN (HCC): ICD-10-CM

## 2019-12-11 DIAGNOSIS — I25.119 CORONARY ARTERY DISEASE INVOLVING NATIVE CORONARY ARTERY OF NATIVE HEART WITH ANGINA PECTORIS (HCC): ICD-10-CM

## 2019-12-11 DIAGNOSIS — I10 ESSENTIAL HYPERTENSION: Primary | ICD-10-CM

## 2019-12-11 DIAGNOSIS — E78.5 HYPERLIPIDEMIA LDL GOAL <70: ICD-10-CM

## 2019-12-11 DIAGNOSIS — E11.69 TYPE 2 DIABETES MELLITUS WITH OTHER SPECIFIED COMPLICATION, WITH LONG-TERM CURRENT USE OF INSULIN (HCC): ICD-10-CM

## 2019-12-11 PROCEDURE — 99495 TRANSJ CARE MGMT MOD F2F 14D: CPT | Performed by: INTERNAL MEDICINE

## 2019-12-11 NOTE — PROGRESS NOTES
Chief Complaint:  Hospital follow up kidney stones    HPI:  Gal Pierce is a 64 y.o. male who presents today for hospital follow up kidney stones. He denies any further flank pain. He has a prior history of kidney stones. No procedures done in hospital, patient passed stones without intervention. Metformin and losartan were held in the hospital due to LORNA.  Blood sugar checks at home within normal range, consistently less than 120 in the morning.  No blood pressure checks at home.  He recently established with cardiology yesterday with his history of CAD.  He has no acute complaints or concerns today.  Date of mission 12/6/2019  Date of discharge 12/7/2019  ROS:  Constitutional: no fevers, night sweats or unexplained weight loss  Eyes: no vision changes  ENT: no runny nose, ear pain, sore throat  Cardio: no chest pain, palpitations  Pulm: no shortness of breath, wheezing, or cough  GI: no abdominal pain or changes in bowel movements  : no difficulty urinating  MSK: no difficulty ambulating, no joint pain  Neuro: no weakness, dizziness or headache  Psych: no trouble sleeping  Endo: no change in appetite      Past Medical History:   Diagnosis Date   • CAD (coronary artery disease)    • Diabetes mellitus (CMS/[facility])    • Hyperlipidemia    • Hypertension       Family History   Problem Relation Age of Onset   • Diabetes Mother    • Aneurysm Mother    • Hypertension Father    • Diabetes Father    • Heart attack Father    • Diabetes Sister    • No Known Problems Brother    • Diabetes Maternal Grandmother    • Hypertension Maternal Grandmother    • Diabetes Maternal Grandfather    • Hypertension Maternal Grandfather    • Diabetes Paternal Grandmother    • Hypertension Paternal Grandmother    • Diabetes Paternal Grandfather    • Hypertension Paternal Grandfather    • Diabetes Sister    • Diabetes Sister    • Cancer Sister    • Hypertension Brother    • Heart disease Brother    • Diabetes Brother    • Hypertension  "Brother       Social History     Socioeconomic History   • Marital status:      Spouse name: Not on file   • Number of children: Not on file   • Years of education: Not on file   • Highest education level: Not on file   Tobacco Use   • Smoking status: Former Smoker   • Smokeless tobacco: Never Used   • Tobacco comment: 40 years ago   Substance and Sexual Activity   • Alcohol use: No     Frequency: Never   • Drug use: No   • Sexual activity: Defer   Social History Narrative    Caffeine: 2-3 cups coffee daily, rare soda      Allergies   Allergen Reactions   • Crestor [Rosuvastatin Calcium] Myalgia   • Hydrochlorothiazide Other (See Comments)     Pt states it makes his blood pressure \"real low\"   • Lipitor [Atorvastatin Calcium] Myalgia   • Penicillins Rash      Immunization History   Administered Date(s) Administered   • FLUARIX/FLUZONE/AFLURIA/FLULAVAL QUAD 10/04/2019   • Pneumococcal Conjugate 13-Valent (PCV13) 08/03/2015   • Pneumococcal Polysaccharide (PPSV23) 07/14/2008   • Tdap 02/13/2009        PE:  Vitals:    12/11/19 0940   BP: 148/82   Pulse: 81   SpO2: 97%      Body mass index is 30.7 kg/m².    Gen Appearance: NAD  HEENT: Normocephalic, PERRLA, no thyromegaly, trache midline  Heart: RRR, normal S1 and S2, no murmur  Lungs: CTA b/l, no wheezing, no crackles  Abdomen: Soft, non-tender, non-distended, no guarding and BSx4  MSK: Moves all extremities well, normal gait, no peripheral edema, no flank pain bilaterally  Pulses: Palpable and equal b/l  Lymph nodes: No palpable lymphadenopathy   Neuro: No focal deficits      Current Outpatient Medications   Medication Sig Dispense Refill   • amLODIPine (NORVASC) 10 MG tablet Take 1 tablet by mouth Daily. 90 tablet 3   • aspirin 81 MG EC tablet Take 1 tablet by mouth Daily. 90 tablet 3   • carvedilol (COREG) 12.5 MG tablet Take 1 tablet by mouth 2 (Two) Times a Day. 60 tablet 3   • clopidogrel (PLAVIX) 75 MG tablet Take 1 tablet by mouth Daily. 90 tablet 3   • " Insulin Glargine (BASAGLAR KWIKPEN) 100 UNIT/ML injection pen Inject 25 Units under the skin into the appropriate area as directed Daily. (Patient taking differently: Inject 100 Units under the skin into the appropriate area as directed Daily.) 7 pen 5   • metFORMIN (GLUCOPHAGE) 1000 MG tablet Take 1 tablet by mouth 2 (Two) Times a Day With Meals. 120 tablet 5   • Multiple Vitamins-Minerals (CENTRUM SILVER PO) Take  by mouth Daily.     • rosuvastatin (CRESTOR) 10 MG tablet Take 1 tablet by mouth Every Night. 90 tablet 0     No current facility-administered medications for this visit.     Current outpatient and discharge medications have been reconciled for the patient.  Reviewed by: DO Gal Yoon was seen today for flank pain.    Diagnoses and all orders for this visit:    Essential hypertension  Kidney function improved on repeat.  LORNA resolved.  Recommend resuming losartan.  Recheck blood pressure in 1 month.  Hyperlipidemia LDL goal <70  Stable on statin without myalgias.  Continue.  Currently taking 1-2 times per week due to previous side effects.  Type 2 diabetes mellitus with other specified complication, with long-term current use of insulin (CMS/[facility])  Blood sugar well controlled.  Recommend resuming metformin.  CAD  No chest pain or anginal symptoms.  Recommend follow-up with cardiology scheduled.  Continue aspirin Plavix and statin  Return in about 4 weeks (around 1/8/2020).     Please note that portions of this document were completed with a voice recognition program. Efforts were made to edit the dictations, but occasionally words are mis-transcribed.

## 2019-12-11 NOTE — OUTREACH NOTE
Attempts have been made to contact pt to complete TCM call and confirm appt.  All attempts have been documented. The patient has a [facility] hospital follow up scheduled with Dr. Tovar 12/11/19.

## 2020-01-22 ENCOUNTER — OFFICE VISIT (OUTPATIENT)
Dept: FAMILY MEDICINE CLINIC | Facility: CLINIC | Age: 65
End: 2020-01-22

## 2020-01-22 VITALS
WEIGHT: 198 LBS | HEART RATE: 89 BPM | HEIGHT: 67 IN | DIASTOLIC BLOOD PRESSURE: 84 MMHG | BODY MASS INDEX: 31.08 KG/M2 | SYSTOLIC BLOOD PRESSURE: 146 MMHG | OXYGEN SATURATION: 98 %

## 2020-01-22 DIAGNOSIS — E11.65 TYPE 2 DIABETES MELLITUS WITH HYPERGLYCEMIA, WITH LONG-TERM CURRENT USE OF INSULIN (HCC): Primary | ICD-10-CM

## 2020-01-22 DIAGNOSIS — Z79.4 TYPE 2 DIABETES MELLITUS WITH HYPERGLYCEMIA, WITH LONG-TERM CURRENT USE OF INSULIN (HCC): Primary | ICD-10-CM

## 2020-01-22 DIAGNOSIS — I10 ESSENTIAL HYPERTENSION: ICD-10-CM

## 2020-01-22 LAB — HBA1C MFR BLD: 7.6 %

## 2020-01-22 PROCEDURE — 83036 HEMOGLOBIN GLYCOSYLATED A1C: CPT | Performed by: INTERNAL MEDICINE

## 2020-01-22 PROCEDURE — 99214 OFFICE O/P EST MOD 30 MIN: CPT | Performed by: INTERNAL MEDICINE

## 2020-01-22 RX ORDER — LOSARTAN POTASSIUM 50 MG/1
50 TABLET ORAL DAILY
Qty: 90 TABLET | Refills: 3 | Status: SHIPPED | OUTPATIENT
Start: 2020-01-22 | End: 2020-07-07 | Stop reason: SDUPTHER

## 2020-01-22 NOTE — PROGRESS NOTES
Chief Complaint:  Hypertension and diabetes    HPI:  Gal Pierce is a 64 y.o. male who presents today for follow-up chronic medical issues.  Type 2 diabetes-sugars typically 1 20-1 40 in the a.m.  Sometimes elevated at 1  depending on diet previous day.  He has not been adherent to diabetic diet over the last 3 months.  He is taking 100 units of insulin in the morning along with metformin 1000 mg twice daily.  Hypertension-taking blood pressure medication daily, blood pressure checks at home typically 130s to 140 systolic.    ROS:  Constitutional: no fevers, night sweats or unexplained weight loss  Eyes: no vision changes  ENT: no runny nose, ear pain, sore throat  Cardio: no chest pain, palpitations  Pulm: no shortness of breath, wheezing, or cough  GI: no abdominal pain or changes in bowel movements  : no difficulty urinating  MSK: no difficulty ambulating, no joint pain  Neuro: no weakness, dizziness or headache  Psych: no trouble sleeping  Endo: no change in appetite      Past Medical History:   Diagnosis Date   • CAD (coronary artery disease)    • Diabetes mellitus (CMS/[facility])    • Hyperlipidemia    • Hypertension       Family History   Problem Relation Age of Onset   • Diabetes Mother    • Aneurysm Mother    • Hypertension Father    • Diabetes Father    • Heart attack Father    • Diabetes Sister    • No Known Problems Brother    • Diabetes Maternal Grandmother    • Hypertension Maternal Grandmother    • Diabetes Maternal Grandfather    • Hypertension Maternal Grandfather    • Diabetes Paternal Grandmother    • Hypertension Paternal Grandmother    • Diabetes Paternal Grandfather    • Hypertension Paternal Grandfather    • Diabetes Sister    • Diabetes Sister    • Cancer Sister    • Hypertension Brother    • Heart disease Brother    • Diabetes Brother    • Hypertension Brother       Social History     Socioeconomic History   • Marital status:      Spouse name: Not on file   • Number of  "children: Not on file   • Years of education: Not on file   • Highest education level: Not on file   Tobacco Use   • Smoking status: Former Smoker   • Smokeless tobacco: Never Used   • Tobacco comment: 40 years ago   Substance and Sexual Activity   • Alcohol use: No     Frequency: Never   • Drug use: No   • Sexual activity: Defer   Social History Narrative    Caffeine: 2-3 cups coffee daily, rare soda      Allergies   Allergen Reactions   • Crestor [Rosuvastatin Calcium] Myalgia   • Hydrochlorothiazide Other (See Comments)     Pt states it makes his blood pressure \"real low\"   • Lipitor [Atorvastatin Calcium] Myalgia   • Penicillins Rash      Immunization History   Administered Date(s) Administered   • FLUARIX/FLUZONE/AFLURIA/FLULAVAL QUAD 10/04/2019   • Pneumococcal Conjugate 13-Valent (PCV13) 08/03/2015   • Pneumococcal Polysaccharide (PPSV23) 07/14/2008   • Tdap 02/13/2009        PE:  Vitals:    01/22/20 1037   BP: 146/84   Pulse: 89   SpO2: 98%      Body mass index is 31.01 kg/m².    Gen Appearance: NAD  HEENT: Normocephalic, PERRLA, no thyromegaly, trache midline  Heart: RRR, normal S1 and S2, no murmur  Lungs: CTA b/l, no wheezing, no crackles  Abdomen: Soft, non-tender, non-distended, no guarding and BSx4  MSK: Moves all extremities well, normal gait, no peripheral edema  Pulses: Palpable and equal b/l  Lymph nodes: No palpable lymphadenopathy   Neuro: No focal deficits      Current Outpatient Medications   Medication Sig Dispense Refill   • amLODIPine (NORVASC) 10 MG tablet Take 1 tablet by mouth Daily. 90 tablet 3   • aspirin 81 MG EC tablet Take 1 tablet by mouth Daily. 90 tablet 3   • carvedilol (COREG) 12.5 MG tablet Take 1 tablet by mouth 2 (Two) Times a Day. 60 tablet 3   • clopidogrel (PLAVIX) 75 MG tablet Take 1 tablet by mouth Daily. 90 tablet 3   • Insulin Glargine (BASAGLAR KWIKPEN) 100 UNIT/ML injection pen Inject 25 Units under the skin into the appropriate area as directed Daily. (Patient " taking differently: Inject 100 Units under the skin into the appropriate area as directed Daily.) 7 pen 5   • metFORMIN (GLUCOPHAGE) 1000 MG tablet Take 1 tablet by mouth 2 (Two) Times a Day With Meals. 120 tablet 5   • Multiple Vitamins-Minerals (CENTRUM SILVER PO) Take  by mouth Daily.     • rosuvastatin (CRESTOR) 10 MG tablet Take 1 tablet by mouth Every Night. 90 tablet 0   • losartan (COZAAR) 50 MG tablet Take 1 tablet by mouth Daily. 90 tablet 3     No current facility-administered medications for this visit.         Gal was seen today for hypertension and diabetes.    Diagnoses and all orders for this visit:    Type 2 diabetes mellitus with hyperglycemia, with long-term current use of insulin (CMS/[facility])  -     POC Glycosylated Hemoglobin (Hb A1C)  A1c elevated, discussed options for treatment with patient.  He would like to work on diet for the next 3 months.  If A1c still above 7 at that time would recommend increasing insulin dosing.  Essential hypertension  -     losartan (COZAAR) 50 MG tablet; Take 1 tablet by mouth Daily.  Add on losartan 50 mg.  Systolic slightly elevated today.       Return in about 3 months (around 4/22/2020).     Please note that portions of this document were completed with a voice recognition program. Efforts were made to edit the dictations, but occasionally words are mis-transcribed.

## 2020-01-23 ENCOUNTER — TELEPHONE (OUTPATIENT)
Dept: FAMILY MEDICINE CLINIC | Facility: CLINIC | Age: 65
End: 2020-01-23

## 2020-01-23 NOTE — TELEPHONE ENCOUNTER
Pt called stating he takes Losartan 100 mg once a day . Per conversation with Dr Tovar , pt stated he requested a callback to confirm.    Pt callback [phone]

## 2020-01-23 NOTE — TELEPHONE ENCOUNTER
Called patient, he wanted to confirm what dr weathers sent in for him yesterday. I advised that it was lostartan one tablet by mouth daily.   Pt verbalized understanding and stated he had no further questions

## 2020-03-27 ENCOUNTER — TELEPHONE (OUTPATIENT)
Dept: CARDIOLOGY | Facility: HOSPITAL | Age: 65
End: 2020-03-27

## 2020-03-27 RX ORDER — BISOPROLOL FUMARATE 5 MG/1
5 TABLET, FILM COATED ORAL NIGHTLY
Qty: 30 TABLET | Refills: 3 | Status: SHIPPED | OUTPATIENT
Start: 2020-03-27 | End: 2020-07-07

## 2020-03-27 NOTE — TELEPHONE ENCOUNTER
"Called patient and he reports that coreg \"messes with his system\". He is taking it off an on but reports it blanca him very tired and makes his joints ache. I asked him if he thought it might be the crestor but he says he is only taking this once a week. He reports SBP in the 130s and HR in the 80s. Will send him in a bisoprolol 5mg nightly. Advised to call me with any persistent symptoms or intolerance.   "

## 2020-03-27 NOTE — TELEPHONE ENCOUNTER
Aston at [facility] reports that patient has not been taking his Carvedilol 12.5 as prescribed. He reports that he was doing follow-up on patient since he has not refilled prescription in awhile and patient reported that he has been taking medication every other day due to the way it makes him feel. Patient reports having fatigue in the joints. Suggested another beta blocker due to side effects. He states that he encouraged patient to take the Carvedilol twice daily, however patient did not seem to be interested in suggestion.

## 2020-04-22 ENCOUNTER — OFFICE VISIT (OUTPATIENT)
Dept: FAMILY MEDICINE CLINIC | Facility: CLINIC | Age: 65
End: 2020-04-22

## 2020-04-22 DIAGNOSIS — I25.119 CORONARY ARTERY DISEASE INVOLVING NATIVE CORONARY ARTERY OF NATIVE HEART WITH ANGINA PECTORIS (HCC): ICD-10-CM

## 2020-04-22 DIAGNOSIS — I10 ESSENTIAL HYPERTENSION: ICD-10-CM

## 2020-04-22 DIAGNOSIS — E11.65 TYPE 2 DIABETES MELLITUS WITH HYPERGLYCEMIA, WITH LONG-TERM CURRENT USE OF INSULIN (HCC): ICD-10-CM

## 2020-04-22 DIAGNOSIS — Z79.4 TYPE 2 DIABETES MELLITUS WITH HYPERGLYCEMIA, WITH LONG-TERM CURRENT USE OF INSULIN (HCC): ICD-10-CM

## 2020-04-22 PROCEDURE — 99441 PR PHYS/QHP TELEPHONE EVALUATION 5-10 MIN: CPT | Performed by: INTERNAL MEDICINE

## 2020-04-22 RX ORDER — CARVEDILOL 12.5 MG/1
TABLET ORAL
COMMUNITY
Start: 2020-04-13 | End: 2020-04-22 | Stop reason: SDUPTHER

## 2020-04-22 RX ORDER — CLOPIDOGREL BISULFATE 75 MG/1
75 TABLET ORAL DAILY
Qty: 90 TABLET | Refills: 3 | Status: SHIPPED | OUTPATIENT
Start: 2020-04-22 | End: 2020-07-07

## 2020-04-22 RX ORDER — AMLODIPINE BESYLATE 10 MG/1
10 TABLET ORAL DAILY
Qty: 90 TABLET | Refills: 3 | Status: SHIPPED | OUTPATIENT
Start: 2020-04-22 | End: 2020-07-07 | Stop reason: SDUPTHER

## 2020-04-22 RX ORDER — INSULIN GLARGINE 100 [IU]/ML
100 INJECTION, SOLUTION SUBCUTANEOUS DAILY
Qty: 10 PEN | Refills: 11 | Status: SHIPPED | OUTPATIENT
Start: 2020-04-22 | End: 2021-04-23 | Stop reason: SDUPTHER

## 2020-04-22 RX ORDER — CARVEDILOL 12.5 MG/1
12.5 TABLET ORAL 2 TIMES DAILY WITH MEALS
Qty: 180 TABLET | Refills: 3 | Status: SHIPPED | OUTPATIENT
Start: 2020-04-22 | End: 2020-07-07 | Stop reason: SDUPTHER

## 2020-04-23 NOTE — PROGRESS NOTES
Chief Complaint   Patient presents with   • Follow-up     3 month follow up last 4 b/p 140/83,136/82,138/78,144/86   • Med Refill     amlodipine, carvedilol, plavix     You have chosen to receive care through a telephone visit. Do you consent to use a telephone visit for your medical care today? Yes    HPI:  Gal Pierce is a 64 y.o. male who presents today for f/u  htn- slightly elevated bp occasionally. Taking meds daily.   dm2- no hypoglycemia. Taking 100 units of basaglar every morning along with metformin. 110-120s fasting usually.   CAD- denies cp, taking meds daily.     ROS:  Constitutional: no fevers, night sweats or unexplained weight loss  Eyes: no vision changes  ENT: no runny nose, ear pain, sore throat  Cardio: no chest pain, palpitations  Pulm: no shortness of breath, wheezing, or cough  GI: no abdominal pain or changes in bowel movements  : no difficulty urinating  MSK: no difficulty ambulating, no joint pain  Neuro: no weakness, dizziness or headache  Psych: no trouble sleeping  Endo: no change in appetite      Past Medical History:   Diagnosis Date   • CAD (coronary artery disease)    • Diabetes mellitus (CMS/[facility])    • Hyperlipidemia    • Hypertension       Family History   Problem Relation Age of Onset   • Diabetes Mother    • Aneurysm Mother    • Hypertension Father    • Diabetes Father    • Heart attack Father    • Diabetes Sister    • No Known Problems Brother    • Diabetes Maternal Grandmother    • Hypertension Maternal Grandmother    • Diabetes Maternal Grandfather    • Hypertension Maternal Grandfather    • Diabetes Paternal Grandmother    • Hypertension Paternal Grandmother    • Diabetes Paternal Grandfather    • Hypertension Paternal Grandfather    • Diabetes Sister    • Diabetes Sister    • Cancer Sister    • Hypertension Brother    • Heart disease Brother    • Diabetes Brother    • Hypertension Brother       Social History     Socioeconomic History   • Marital status:      " Spouse name: Not on file   • Number of children: Not on file   • Years of education: Not on file   • Highest education level: Not on file   Tobacco Use   • Smoking status: Former Smoker   • Smokeless tobacco: Never Used   • Tobacco comment: 40 years ago   Substance and Sexual Activity   • Alcohol use: No     Frequency: Never   • Drug use: No   • Sexual activity: Defer   Social History Narrative    Caffeine: 2-3 cups coffee daily, rare soda      Allergies   Allergen Reactions   • Crestor [Rosuvastatin Calcium] Myalgia   • Hydrochlorothiazide Other (See Comments)     Pt states it makes his blood pressure \"real low\"   • Lipitor [Atorvastatin Calcium] Myalgia   • Penicillins Rash      Immunization History   Administered Date(s) Administered   • FLUAD TRI 65YR+ 02/14/2013   • FLUARIX/FLUZONE/AFLURIA/FLULAVAL QUAD 11/26/2014, 10/04/2019   • Pneumococcal Conjugate 13-Valent (PCV13) 08/03/2015   • Pneumococcal Polysaccharide (PPSV23) 07/14/2008   • Tdap 02/13/2009        PE:  There were no vitals filed for this visit.   There is no height or weight on file to calculate BMI.        Current Outpatient Medications   Medication Sig Dispense Refill   • amLODIPine (NORVASC) 10 MG tablet Take 1 tablet by mouth Daily. 90 tablet 3   • aspirin 81 MG EC tablet Take 1 tablet by mouth Daily. 90 tablet 3   • bisoprolol (ZEBeta) 5 MG tablet Take 1 tablet by mouth Every Night. 30 tablet 3   • carvedilol (COREG) 12.5 MG tablet Take 1 tablet by mouth 2 (Two) Times a Day With Meals. 180 tablet 3   • clopidogrel (Plavix) 75 MG tablet Take 1 tablet by mouth Daily. 90 tablet 3   • Insulin Glargine (BASAGLAR KWIKPEN) 100 UNIT/ML injection pen Inject 100 Units under the skin into the appropriate area as directed Daily. 10 pen 11   • losartan (COZAAR) 50 MG tablet Take 1 tablet by mouth Daily. 90 tablet 3   • metFORMIN (GLUCOPHAGE) 1000 MG tablet Take 1 tablet by mouth 2 (Two) Times a Day With Meals. 120 tablet 5   • Multiple Vitamins-Minerals " (CENTRUM SILVER PO) Take  by mouth Daily.     • rosuvastatin (CRESTOR) 10 MG tablet Take 1 tablet by mouth Every Night. 90 tablet 0     No current facility-administered medications for this visit.     10 min was spent discussing with patient via telephone.     Major was seen today for follow-up and med refill.    Diagnoses and all orders for this visit:    Essential hypertension  -     amLODIPine (NORVASC) 10 MG tablet; Take 1 tablet by mouth Daily.  Cont current meds for now. Recheck bp in office 3 months.  Coronary artery disease involving native coronary artery of native heart with angina pectoris (CMS/[facility])  -     clopidogrel (Plavix) 75 MG tablet; Take 1 tablet by mouth Daily.  Refill plavix. Stable.   Type 2 diabetes mellitus with hyperglycemia, with long-term current use of insulin (CMS/[facility])  -     Insulin Glargine (BASAGLAR KWIKPEN) 100 UNIT/ML injection pen; Inject 100 Units under the skin into the appropriate area as directed Daily.  Cont current dosing. Check a1c 3 months.   Other orders  -     carvedilol (COREG) 12.5 MG tablet; Take 1 tablet by mouth 2 (Two) Times a Day With Meals.         No follow-ups on file.     Please note that portions of this document were completed with a voice recognition program. Efforts were made to edit the dictations, but occasionally words are mis-transcribed.

## 2020-07-07 ENCOUNTER — OFFICE VISIT (OUTPATIENT)
Dept: CARDIOLOGY | Facility: CLINIC | Age: 65
End: 2020-07-07

## 2020-07-07 VITALS
BODY MASS INDEX: 29.95 KG/M2 | HEART RATE: 83 BPM | DIASTOLIC BLOOD PRESSURE: 68 MMHG | SYSTOLIC BLOOD PRESSURE: 136 MMHG | WEIGHT: 190.8 LBS | OXYGEN SATURATION: 95 % | HEIGHT: 67 IN | TEMPERATURE: 97.1 F

## 2020-07-07 DIAGNOSIS — E78.5 HYPERLIPIDEMIA LDL GOAL <70: ICD-10-CM

## 2020-07-07 DIAGNOSIS — I25.119 CORONARY ARTERY DISEASE INVOLVING NATIVE CORONARY ARTERY OF NATIVE HEART WITH ANGINA PECTORIS (HCC): Primary | ICD-10-CM

## 2020-07-07 DIAGNOSIS — Z79.4 TYPE 2 DIABETES MELLITUS WITH HYPERGLYCEMIA, WITH LONG-TERM CURRENT USE OF INSULIN (HCC): ICD-10-CM

## 2020-07-07 DIAGNOSIS — E11.65 TYPE 2 DIABETES MELLITUS WITH HYPERGLYCEMIA, WITH LONG-TERM CURRENT USE OF INSULIN (HCC): ICD-10-CM

## 2020-07-07 DIAGNOSIS — I10 ESSENTIAL HYPERTENSION: ICD-10-CM

## 2020-07-07 PROBLEM — N17.9 ACUTE KIDNEY INJURY (HCC): Status: RESOLVED | Noted: 2019-12-07 | Resolved: 2020-07-07

## 2020-07-07 PROCEDURE — 99213 OFFICE O/P EST LOW 20 MIN: CPT | Performed by: INTERNAL MEDICINE

## 2020-07-07 RX ORDER — AMLODIPINE BESYLATE 10 MG/1
10 TABLET ORAL DAILY
Qty: 90 TABLET | Refills: 3 | Status: SHIPPED | OUTPATIENT
Start: 2020-07-07 | End: 2021-08-27 | Stop reason: SDUPTHER

## 2020-07-07 RX ORDER — CARVEDILOL 12.5 MG/1
12.5 TABLET ORAL 2 TIMES DAILY WITH MEALS
Qty: 180 TABLET | Refills: 3 | Status: SHIPPED | OUTPATIENT
Start: 2020-07-07 | End: 2021-02-24

## 2020-07-07 RX ORDER — ROSUVASTATIN CALCIUM 10 MG/1
10 TABLET, COATED ORAL NIGHTLY
Qty: 90 TABLET | Refills: 0 | Status: SHIPPED | OUTPATIENT
Start: 2020-07-07 | End: 2021-07-13 | Stop reason: SDUPTHER

## 2020-07-07 RX ORDER — LOSARTAN POTASSIUM 50 MG/1
50 TABLET ORAL DAILY
Qty: 90 TABLET | Refills: 3 | Status: SHIPPED | OUTPATIENT
Start: 2020-07-07 | End: 2020-10-23

## 2020-07-07 RX ORDER — SILDENAFIL 100 MG/1
100 TABLET, FILM COATED ORAL DAILY PRN
Qty: 10 TABLET | Refills: 3 | Status: SHIPPED | OUTPATIENT
Start: 2020-07-07 | End: 2021-08-09

## 2020-07-07 NOTE — PROGRESS NOTES
"[facility]  Office Visit Note    DATE: 07/07/2020    IDENTIFICATION: Gal Pierce is a 64 y.o. male who resides in [place], [place].    REASON FOR VISIT:  • Coronary artery disease  • Cardiac risk factors            Gal Pierce presents to my office for follow-up of his coronary artery disease and cardiac risk factors.  The patient is doing well from a cardiovascular standpoint.  He has not experienced any angina or heart failure symptoms.  He is tolerating his medications relatively well.  He does continue to have some muscle ache when he does take his Crestor, which is approximately 1-2 times a week.    Review of Systems   Constitution: Negative for malaise/fatigue.   Eyes: Negative for vision loss in left eye and vision loss in right eye.   Cardiovascular: Negative.  Negative for chest pain, dyspnea on exertion, near-syncope, orthopnea, palpitations, paroxysmal nocturnal dyspnea and syncope.   Respiratory: Negative.    Musculoskeletal: Negative for myalgias.   Neurological: Negative for brief paralysis, excessive daytime sleepiness, focal weakness, numbness, paresthesias and weakness.   All other systems reviewed and are negative.      The patient's past medical, social, family history and ROS reviewed in the patient's electronic medical record.    Allergies   Allergen Reactions   • Crestor [Rosuvastatin Calcium] Myalgia   • Hydrochlorothiazide Other (See Comments)     Pt states it makes his blood pressure \"real low\"   • Lipitor [Atorvastatin Calcium] Myalgia   • Penicillins Rash         Current Outpatient Medications:   •  amLODIPine (NORVASC) 10 MG tablet, Take 1 tablet by mouth Daily., Disp: 90 tablet, Rfl: 3  •  aspirin 81 MG EC tablet, Take 1 tablet by mouth Daily., Disp: 90 tablet, Rfl: 3  •  carvedilol (COREG) 12.5 MG tablet, Take 1 tablet by mouth 2 (Two) Times a Day With Meals., Disp: 180 tablet, Rfl: 3  •  Insulin Glargine (BASAGLAR KWIKPEN) 100 " UNIT/ML injection pen, Inject 100 Units under the skin into the appropriate area as directed Daily., Disp: 10 pen, Rfl: 11  •  losartan (Cozaar) 50 MG tablet, Take 1 tablet by mouth Daily., Disp: 90 tablet, Rfl: 3  •  metFORMIN (GLUCOPHAGE) 1000 MG tablet, Take 1 tablet by mouth 2 (Two) Times a Day With Meals., Disp: 120 tablet, Rfl: 5  •  Multiple Vitamins-Minerals (CENTRUM SILVER PO), Take  by mouth Daily., Disp: , Rfl:   •  rosuvastatin (CRESTOR) 10 MG tablet, Take 1 tablet by mouth Every Night., Disp: 90 tablet, Rfl: 0  •  Empagliflozin (Jardiance) 10 MG tablet, Take 10 mg by mouth Daily., Disp: 90 tablet, Rfl: 1  •  sildenafil (Viagra) 100 MG tablet, Take 1 tablet by mouth Daily As Needed for Erectile Dysfunction., Disp: 10 tablet, Rfl: 3    Past Medical History:   Diagnosis Date   • CAD (coronary artery disease)    • Diabetes mellitus (CMS/HCC)    • Hyperlipidemia    • Hypertension        Past Surgical History:   Procedure Laterality Date   • APPENDECTOMY     • CORONARY ARTERY BYPASS GRAFT  10/29/2018    By Osman Slater with LIMA to LAD, SVG to RCA, sequential SVG to OM/LPL       Family History   Problem Relation Age of Onset   • Diabetes Mother    • Aneurysm Mother    • Hypertension Father    • Diabetes Father    • Heart attack Father    • Diabetes Sister    • No Known Problems Brother    • Diabetes Maternal Grandmother    • Hypertension Maternal Grandmother    • Diabetes Maternal Grandfather    • Hypertension Maternal Grandfather    • Diabetes Paternal Grandmother    • Hypertension Paternal Grandmother    • Diabetes Paternal Grandfather    • Hypertension Paternal Grandfather    • Diabetes Sister    • Diabetes Sister    • Cancer Sister    • Hypertension Brother    • Heart disease Brother    • Diabetes Brother    • Hypertension Brother        Social History     Tobacco Use   • Smoking status: Former Smoker   • Smokeless tobacco: Never Used   • Tobacco comment: 40 years ago   Substance Use Topics   • Alcohol  "use: No     Frequency: Never           Blood pressure 136/68, pulse 83, temperature 97.1 °F (36.2 °C), height 170.2 cm (67\"), weight 86.5 kg (190 lb 12.8 oz), SpO2 95 %.  Body mass index is 29.88 kg/m².  Vitals:    07/07/20 1008   Patient Position: Sitting       Physical Exam   Constitutional: He is oriented to person, place, and time. He appears well-developed and well-nourished.   HENT:   Head: Normocephalic and atraumatic.   Eyes: Pupils are equal, round, and reactive to light. No scleral icterus.   Neck: No JVD present. Carotid bruit is not present. No thyromegaly present.   Cardiovascular: Normal rate, regular rhythm, S1 normal and S2 normal. Exam reveals no gallop.   No murmur heard.  Pulmonary/Chest: Effort normal and breath sounds normal.   Abdominal: Soft. He exhibits no mass. There is no hepatosplenomegaly. There is no tenderness.   Neurological: He is alert and oriented to person, place, and time.   Skin: Skin is warm and dry. No cyanosis. Nails show no clubbing.   Psychiatric: He has a normal mood and affect. His behavior is normal.       Data Review (reviewed with patient):     Procedures    Lab Results   Component Value Date    GLUCOSE 128 (H) 12/10/2019    BUN 13 12/10/2019    CREATININE 0.99 12/10/2019    EGFRIFAFRI 93 12/10/2019    BCR 13.1 12/10/2019    K 4.2 12/10/2019    CO2 27.4 12/10/2019    CALCIUM 9.5 12/10/2019    ALBUMIN 4.20 12/06/2019    ALKPHOS 50 12/06/2019    AST 29 12/06/2019    ALT 30 12/06/2019       Lab Results   Component Value Date    HGBA1C 7.6 01/22/2020     Lab Results   Component Value Date    WBC 10.81 (H) 12/07/2019    HGB 16.0 12/07/2019    HCT 49.7 12/07/2019    MCV 88.4 12/07/2019     (H) 12/07/2019     Lab Results   Component Value Date    TSH 0.348 10/04/2019      Lipids (2019):  · 12/10/2019:   · 03/04/2019: , , HDL 36,         Problem List Items Addressed This Visit        Cardiology Problems    Coronary artery disease involving " native coronary artery of native heart with angina pectoris (CMS/[facility]) - Primary    Overview     · NSTEMI in 2002 with drug-eluting stent to the LAD  · NSTEMI 2004 with drug-eluting stent to the left circumflex.  · Echocardiogram (10/26/2018): LVEF normal. Mild LVH. Mild MR.   · Cardiac catheterization for NSTEMI  (10/26/2018): severe 3-vessel CAD.  LVEF normal.  · CABG (10/29/2018): LIMA to LAD, SVG to RCA, sequential SVG to OM and LPL         Current Assessment & Plan     · No angina  · Discontinue clopidogrel  · Continue low-dose aspirin, carvedilol, and statin therapy         Relevant Medications    carvedilol (COREG) 12.5 MG tablet    amLODIPine (NORVASC) 10 MG tablet    sildenafil (Viagra) 100 MG tablet    Essential hypertension    Overview     • Target blood pressure <130/80 mmHg         Current Assessment & Plan     · Controlled at today's visit  · Continue carvedilol, losartan, and amlodipine           Relevant Medications    carvedilol (COREG) 12.5 MG tablet    losartan (Cozaar) 50 MG tablet    amLODIPine (NORVASC) 10 MG tablet    Hyperlipidemia LDL goal <70    Overview     · High intensity statin therapy indicated due to CAD  · Historically intolerant to rosuvastatin and atorvastatin         Current Assessment & Plan     · Taking maximally tolerated rosuvastatin         Relevant Medications    rosuvastatin (CRESTOR) 10 MG tablet       Other    Type 2 diabetes mellitus with hyperglycemia, with long-term current use of insulin (CMS/[facility])    Current Assessment & Plan     · Start Jardiance 10 mg daily for diabetes and CV risk reduction         Relevant Medications    Empagliflozin (Jardiance) 10 MG tablet               · Discontinue clopidogrel  · Start Jardiance 10 mg daily for diabetes and CV risk reduction  Return in about 1 year (around 7/7/2021) for Follow-up with [facility] only.      KIKA Faust MD [facility]  Interventional and General Cardiology      7/7/2020

## 2020-07-07 NOTE — ASSESSMENT & PLAN NOTE
· No angina  · Discontinue clopidogrel  · Continue low-dose aspirin, carvedilol, and statin therapy

## 2020-07-23 ENCOUNTER — OFFICE VISIT (OUTPATIENT)
Dept: FAMILY MEDICINE CLINIC | Facility: CLINIC | Age: 65
End: 2020-07-23

## 2020-07-23 VITALS
SYSTOLIC BLOOD PRESSURE: 134 MMHG | HEIGHT: 67 IN | DIASTOLIC BLOOD PRESSURE: 82 MMHG | HEART RATE: 80 BPM | OXYGEN SATURATION: 98 % | WEIGHT: 189 LBS | BODY MASS INDEX: 29.66 KG/M2

## 2020-07-23 DIAGNOSIS — Z79.4 TYPE 2 DIABETES MELLITUS WITH HYPERGLYCEMIA, WITH LONG-TERM CURRENT USE OF INSULIN (HCC): ICD-10-CM

## 2020-07-23 DIAGNOSIS — E11.65 TYPE 2 DIABETES MELLITUS WITH HYPERGLYCEMIA, WITH LONG-TERM CURRENT USE OF INSULIN (HCC): ICD-10-CM

## 2020-07-23 DIAGNOSIS — I10 ESSENTIAL HYPERTENSION: ICD-10-CM

## 2020-07-23 DIAGNOSIS — Z00.00 PREVENTATIVE HEALTH CARE: Primary | ICD-10-CM

## 2020-07-23 PROCEDURE — 99396 PREV VISIT EST AGE 40-64: CPT | Performed by: INTERNAL MEDICINE

## 2020-07-23 NOTE — PROGRESS NOTES
Chief Complaint   Patient presents with   • Annual Exam       HPI:  Gal Pierce is a 65 y.o. male who presents today for annual physical.  No acute concerns today.  Chronic medical conditions are stable.    ROS:  Constitutional: no fevers, night sweats or unexplained weight loss  Eyes: no vision changes  ENT: no runny nose, ear pain, sore throat  Cardio: no chest pain, palpitations  Pulm: no shortness of breath, wheezing, or cough  GI: no abdominal pain or changes in bowel movements  : no difficulty urinating  MSK: no difficulty ambulating, no joint pain  Neuro: no weakness, dizziness or headache  Psych: no trouble sleeping  Endo: no change in appetite      Past Medical History:   Diagnosis Date   • CAD (coronary artery disease)    • Diabetes mellitus (CMS/[facility])    • Hyperlipidemia    • Hypertension       Family History   Problem Relation Age of Onset   • Diabetes Mother    • Aneurysm Mother    • Hypertension Father    • Diabetes Father    • Heart attack Father    • Diabetes Sister    • No Known Problems Brother    • Diabetes Maternal Grandmother    • Hypertension Maternal Grandmother    • Diabetes Maternal Grandfather    • Hypertension Maternal Grandfather    • Diabetes Paternal Grandmother    • Hypertension Paternal Grandmother    • Diabetes Paternal Grandfather    • Hypertension Paternal Grandfather    • Diabetes Sister    • Diabetes Sister    • Cancer Sister    • Hypertension Brother    • Heart disease Brother    • Diabetes Brother    • Hypertension Brother       Social History     Socioeconomic History   • Marital status:      Spouse name: Not on file   • Number of children: Not on file   • Years of education: Not on file   • Highest education level: Not on file   Tobacco Use   • Smoking status: Former Smoker   • Smokeless tobacco: Never Used   • Tobacco comment: 40 years ago   Substance and Sexual Activity   • Alcohol use: No     Frequency: Never   • Drug use: No   • Sexual activity: Defer  "  Social History Narrative    Caffeine: 2-3 cups coffee daily, rare soda      Allergies   Allergen Reactions   • Crestor [Rosuvastatin Calcium] Myalgia   • Hydrochlorothiazide Other (See Comments)     Pt states it makes his blood pressure \"real low\"   • Lipitor [Atorvastatin Calcium] Myalgia   • Penicillins Rash      Immunization History   Administered Date(s) Administered   • FLUAD TRI 65YR+ 02/14/2013   • FLUARIX/FLUZONE/AFLURIA/FLULAVAL QUAD 11/26/2014, 10/04/2019   • Pneumococcal Conjugate 13-Valent (PCV13) 08/03/2015   • Pneumococcal Polysaccharide (PPSV23) 07/14/2008   • Tdap 02/13/2009        PE:  Vitals:    07/23/20 1018   BP: 134/82   Pulse: 80   SpO2: 98%      Body mass index is 29.6 kg/m².    Gen Appearance: NAD  HEENT: Normocephalic, PERRLA, no thyromegaly, trache midline  Heart: RRR, normal S1 and S2, no murmur  Lungs: CTA b/l, no wheezing, no crackles  Abdomen: Soft, non-tender, non-distended, no guarding and BSx4  MSK: Moves all extremities well, normal gait, no peripheral edema  Pulses: Palpable and equal b/l  Lymph nodes: No palpable lymphadenopathy   Neuro: No focal deficits      Current Outpatient Medications   Medication Sig Dispense Refill   • amLODIPine (NORVASC) 10 MG tablet Take 1 tablet by mouth Daily. 90 tablet 3   • aspirin 81 MG EC tablet Take 1 tablet by mouth Daily. 90 tablet 3   • carvedilol (COREG) 12.5 MG tablet Take 1 tablet by mouth 2 (Two) Times a Day With Meals. 180 tablet 3   • Empagliflozin (Jardiance) 10 MG tablet Take 10 mg by mouth Daily. 90 tablet 1   • Insulin Glargine (BASAGLAR KWIKPEN) 100 UNIT/ML injection pen Inject 100 Units under the skin into the appropriate area as directed Daily. 10 pen 11   • losartan (Cozaar) 50 MG tablet Take 1 tablet by mouth Daily. 90 tablet 3   • metFORMIN (GLUCOPHAGE) 1000 MG tablet Take 1 tablet by mouth 2 (Two) Times a Day With Meals. 120 tablet 5   • Multiple Vitamins-Minerals (CENTRUM SILVER PO) Take  by mouth Daily.     • rosuvastatin " (CRESTOR) 10 MG tablet Take 1 tablet by mouth Every Night. 90 tablet 0   • sildenafil (Viagra) 100 MG tablet Take 1 tablet by mouth Daily As Needed for Erectile Dysfunction. 10 tablet 3     No current facility-administered medications for this visit.         Gal was seen today for annual exam.    Diagnoses and all orders for this visit:    Preventative health care  Counseled on healthy weight, nutrition, physical activity, cancer screening, and immunizations.    Essential hypertension  Stable.  Continue current medication.  Type 2 diabetes mellitus with hyperglycemia, with long-term current use of insulin (CMS/[facility])  Checking A1c with blood work.  Unable to afford Jardiance.  Continues to take 100 units of Basaglar as well as metformin.       Return in about 3 months (around 10/23/2020).     Please note that portions of this document were completed with a voice recognition program. Efforts were made to edit the dictations, but occasionally words are mis-transcribed.

## 2020-08-10 ENCOUNTER — LAB (OUTPATIENT)
Dept: LAB | Facility: HOSPITAL | Age: 65
End: 2020-08-10

## 2020-08-10 DIAGNOSIS — Z12.5 PROSTATE CANCER SCREENING: ICD-10-CM

## 2020-08-10 DIAGNOSIS — Z79.4 TYPE 2 DIABETES MELLITUS WITH HYPERGLYCEMIA, WITH LONG-TERM CURRENT USE OF INSULIN (HCC): ICD-10-CM

## 2020-08-10 DIAGNOSIS — I10 ESSENTIAL HYPERTENSION: Primary | ICD-10-CM

## 2020-08-10 DIAGNOSIS — E55.9 VITAMIN D DEFICIENCY: ICD-10-CM

## 2020-08-10 DIAGNOSIS — I10 ESSENTIAL HYPERTENSION: ICD-10-CM

## 2020-08-10 DIAGNOSIS — E11.65 TYPE 2 DIABETES MELLITUS WITH HYPERGLYCEMIA, WITH LONG-TERM CURRENT USE OF INSULIN (HCC): ICD-10-CM

## 2020-08-10 DIAGNOSIS — I25.119 CORONARY ARTERY DISEASE INVOLVING NATIVE CORONARY ARTERY OF NATIVE HEART WITH ANGINA PECTORIS (HCC): ICD-10-CM

## 2020-08-10 LAB
25(OH)D3 SERPL-MCNC: 27.8 NG/ML (ref 30–100)
BACTERIA UR QL AUTO: NORMAL /HPF
BASOPHILS # BLD AUTO: 0.07 10*3/MM3 (ref 0–0.2)
BASOPHILS NFR BLD AUTO: 1.2 % (ref 0–1.5)
BILIRUB UR QL STRIP: NEGATIVE
CLARITY UR: CLEAR
COLOR UR: YELLOW
DEPRECATED RDW RBC AUTO: 48.7 FL (ref 37–54)
EOSINOPHIL # BLD AUTO: 0.34 10*3/MM3 (ref 0–0.4)
EOSINOPHIL NFR BLD AUTO: 5.9 % (ref 0.3–6.2)
ERYTHROCYTE [DISTWIDTH] IN BLOOD BY AUTOMATED COUNT: 15.1 % (ref 12.3–15.4)
GLUCOSE UR STRIP-MCNC: NEGATIVE MG/DL
HBA1C MFR BLD: 6.1 % (ref 4.8–5.6)
HCT VFR BLD AUTO: 51.8 % (ref 37.5–51)
HGB BLD-MCNC: 17.1 G/DL (ref 13–17.7)
HGB UR QL STRIP.AUTO: NEGATIVE
HYALINE CASTS UR QL AUTO: NORMAL /LPF
IMM GRANULOCYTES # BLD AUTO: 0.01 10*3/MM3 (ref 0–0.05)
IMM GRANULOCYTES NFR BLD AUTO: 0.2 % (ref 0–0.5)
KETONES UR QL STRIP: ABNORMAL
LEUKOCYTE ESTERASE UR QL STRIP.AUTO: NEGATIVE
LYMPHOCYTES # BLD AUTO: 2.09 10*3/MM3 (ref 0.7–3.1)
LYMPHOCYTES NFR BLD AUTO: 36.3 % (ref 19.6–45.3)
MCH RBC QN AUTO: 29.2 PG (ref 26.6–33)
MCHC RBC AUTO-ENTMCNC: 33 G/DL (ref 31.5–35.7)
MCV RBC AUTO: 88.5 FL (ref 79–97)
MONOCYTES # BLD AUTO: 0.44 10*3/MM3 (ref 0.1–0.9)
MONOCYTES NFR BLD AUTO: 7.6 % (ref 5–12)
NEUTROPHILS NFR BLD AUTO: 2.81 10*3/MM3 (ref 1.7–7)
NEUTROPHILS NFR BLD AUTO: 48.8 % (ref 42.7–76)
NITRITE UR QL STRIP: NEGATIVE
NRBC BLD AUTO-RTO: 0 /100 WBC (ref 0–0.2)
PH UR STRIP.AUTO: 6.5 [PH] (ref 5–8)
PLATELET # BLD AUTO: 497 10*3/MM3 (ref 140–450)
PMV BLD AUTO: 9.8 FL (ref 6–12)
PROT UR QL STRIP: ABNORMAL
PSA SERPL-MCNC: 3.07 NG/ML (ref 0–4)
RBC # BLD AUTO: 5.85 10*6/MM3 (ref 4.14–5.8)
RBC # UR: NORMAL /HPF
REF LAB TEST METHOD: NORMAL
SP GR UR STRIP: 1.03 (ref 1–1.03)
SQUAMOUS #/AREA URNS HPF: NORMAL /HPF
T4 FREE SERPL-MCNC: 1.14 NG/DL (ref 0.93–1.7)
TSH SERPL DL<=0.05 MIU/L-ACNC: 0.34 UIU/ML (ref 0.27–4.2)
UROBILINOGEN UR QL STRIP: ABNORMAL
WBC # BLD AUTO: 5.76 10*3/MM3 (ref 3.4–10.8)
WBC UR QL AUTO: NORMAL /HPF

## 2020-08-10 PROCEDURE — 81001 URINALYSIS AUTO W/SCOPE: CPT

## 2020-08-10 PROCEDURE — 84439 ASSAY OF FREE THYROXINE: CPT

## 2020-08-10 PROCEDURE — 80061 LIPID PANEL: CPT

## 2020-08-10 PROCEDURE — 82306 VITAMIN D 25 HYDROXY: CPT

## 2020-08-10 PROCEDURE — 85025 COMPLETE CBC W/AUTO DIFF WBC: CPT

## 2020-08-10 PROCEDURE — 83036 HEMOGLOBIN GLYCOSYLATED A1C: CPT

## 2020-08-10 PROCEDURE — G0103 PSA SCREENING: HCPCS

## 2020-08-10 PROCEDURE — 84443 ASSAY THYROID STIM HORMONE: CPT

## 2020-08-10 PROCEDURE — 80053 COMPREHEN METABOLIC PANEL: CPT

## 2020-08-11 LAB
ALBUMIN SERPL-MCNC: 4.3 G/DL (ref 3.5–5.2)
ALBUMIN/GLOB SERPL: 1.3 G/DL
ALP SERPL-CCNC: 49 U/L (ref 39–117)
ALT SERPL W P-5'-P-CCNC: 38 U/L (ref 1–41)
ANION GAP SERPL CALCULATED.3IONS-SCNC: 9.6 MMOL/L (ref 5–15)
AST SERPL-CCNC: 30 U/L (ref 1–40)
BILIRUB SERPL-MCNC: 0.3 MG/DL (ref 0–1.2)
BUN SERPL-MCNC: 15 MG/DL (ref 8–23)
BUN/CREAT SERPL: 17.4 (ref 7–25)
CALCIUM SPEC-SCNC: 10 MG/DL (ref 8.6–10.5)
CHLORIDE SERPL-SCNC: 98 MMOL/L (ref 98–107)
CHOLEST SERPL-MCNC: 175 MG/DL (ref 0–200)
CO2 SERPL-SCNC: 25.4 MMOL/L (ref 22–29)
CREAT SERPL-MCNC: 0.86 MG/DL (ref 0.76–1.27)
GFR SERPL CREATININE-BSD FRML MDRD: 109 ML/MIN/1.73
GLOBULIN UR ELPH-MCNC: 3.3 GM/DL
GLUCOSE SERPL-MCNC: 123 MG/DL (ref 65–99)
HDLC SERPL-MCNC: 38 MG/DL (ref 40–60)
LDLC SERPL CALC-MCNC: 126 MG/DL (ref 0–100)
LDLC/HDLC SERPL: 3.32 {RATIO}
POTASSIUM SERPL-SCNC: 4.1 MMOL/L (ref 3.5–5.2)
PROT SERPL-MCNC: 7.6 G/DL (ref 6–8.5)
SODIUM SERPL-SCNC: 133 MMOL/L (ref 136–145)
TRIGL SERPL-MCNC: 55 MG/DL (ref 0–150)
VLDLC SERPL-MCNC: 11 MG/DL (ref 5–40)

## 2020-08-24 ENCOUNTER — TELEPHONE (OUTPATIENT)
Dept: FAMILY MEDICINE CLINIC | Facility: CLINIC | Age: 65
End: 2020-08-24

## 2020-08-24 NOTE — TELEPHONE ENCOUNTER
PATIENT CALLING IN TO REQUEST INFORMATION ABOUT A DIAGNOSIS HE WAS GIVEN FROM DR SKELTON PREVIOUSLY.    PLEASE CALL AND ADVISE -[phone]

## 2020-08-25 NOTE — TELEPHONE ENCOUNTER
"Spoke to patient.     He states his previous PCP told him he had a \"blood disorder\". He is wondering what it is called? And if he should follow up or be concerned? He did have labs recently.     He was unable to give me any further info.   "

## 2020-08-27 NOTE — TELEPHONE ENCOUNTER
Left Message for Pt to return our call.   If Pt calls back ok for Hub to relay message and Schedule an appt

## 2020-08-28 ENCOUNTER — OFFICE VISIT (OUTPATIENT)
Dept: FAMILY MEDICINE CLINIC | Facility: CLINIC | Age: 65
End: 2020-08-28

## 2020-08-28 VITALS
DIASTOLIC BLOOD PRESSURE: 80 MMHG | HEART RATE: 86 BPM | WEIGHT: 188.2 LBS | OXYGEN SATURATION: 97 % | HEIGHT: 67 IN | SYSTOLIC BLOOD PRESSURE: 145 MMHG | BODY MASS INDEX: 29.54 KG/M2

## 2020-08-28 DIAGNOSIS — Z79.4 TYPE 2 DIABETES MELLITUS WITH HYPERGLYCEMIA, WITH LONG-TERM CURRENT USE OF INSULIN (HCC): ICD-10-CM

## 2020-08-28 DIAGNOSIS — E78.5 HYPERLIPIDEMIA LDL GOAL <70: ICD-10-CM

## 2020-08-28 DIAGNOSIS — E11.65 TYPE 2 DIABETES MELLITUS WITH HYPERGLYCEMIA, WITH LONG-TERM CURRENT USE OF INSULIN (HCC): ICD-10-CM

## 2020-08-28 DIAGNOSIS — I10 ESSENTIAL HYPERTENSION: Primary | ICD-10-CM

## 2020-08-28 PROCEDURE — 99214 OFFICE O/P EST MOD 30 MIN: CPT | Performed by: INTERNAL MEDICINE

## 2020-08-28 NOTE — PROGRESS NOTES
"Chief Complaint   Patient presents with   • Consult       HPI:  Gal Pierce is a 65 y.o. male who presents today for f/u and to discuss medical history. He is asking about prior \"blood disorder\", his daughter recently has DVT/PE and would like to know if this is hereditary. He in unsure of prior condition or any details but states he was following with Dr. Davis for several years but was lost to follow up recently.   DM2- taking meds/insulin daily, no sugar checks.  HTN- taking meds as prescribed, no side effects, no BP checks at home.   HLD- taking statin daily, no myalgias.  ROS:  Constitutional: no fevers, night sweats or unexplained weight loss  Eyes: no vision changes  ENT: no runny nose, ear pain, sore throat  Cardio: no chest pain, palpitations  Pulm: no shortness of breath, wheezing, or cough  GI: no abdominal pain or changes in bowel movements  : no difficulty urinating  MSK: no difficulty ambulating, no joint pain  Neuro: no weakness, dizziness or headache  Psych: no trouble sleeping  Endo: no change in appetite      Past Medical History:   Diagnosis Date   • CAD (coronary artery disease)    • Diabetes mellitus (CMS/HCC)    • Hyperlipidemia    • Hypertension       Family History   Problem Relation Age of Onset   • Diabetes Mother    • Aneurysm Mother    • Hypertension Father    • Diabetes Father    • Heart attack Father    • Diabetes Sister    • No Known Problems Brother    • Diabetes Maternal Grandmother    • Hypertension Maternal Grandmother    • Diabetes Maternal Grandfather    • Hypertension Maternal Grandfather    • Diabetes Paternal Grandmother    • Hypertension Paternal Grandmother    • Diabetes Paternal Grandfather    • Hypertension Paternal Grandfather    • Diabetes Sister    • Diabetes Sister    • Cancer Sister    • Hypertension Brother    • Heart disease Brother    • Diabetes Brother    • Hypertension Brother       Social History     Socioeconomic History   • Marital status:     " " Spouse name: Not on file   • Number of children: Not on file   • Years of education: Not on file   • Highest education level: Not on file   Tobacco Use   • Smoking status: Former Smoker     Packs/day: 0.25     Years: 15.00     Pack years: 3.75   • Smokeless tobacco: Never Used   • Tobacco comment: 40 years ago   Substance and Sexual Activity   • Alcohol use: No     Frequency: Never   • Drug use: No   • Sexual activity: Defer   Social History Narrative    Caffeine: 2-3 cups coffee daily, rare soda      Allergies   Allergen Reactions   • Crestor [Rosuvastatin Calcium] Myalgia   • Hydrochlorothiazide Other (See Comments)     Pt states it makes his blood pressure \"real low\"   • Lipitor [Atorvastatin Calcium] Myalgia   • Penicillins Rash      Immunization History   Administered Date(s) Administered   • FLUAD TRI 65YR+ 02/14/2013   • Flulaval/Fluarix Quad 11/26/2014, 10/04/2019   • Pneumococcal Conjugate 13-Valent (PCV13) 08/03/2015   • Pneumococcal Polysaccharide (PPSV23) 07/14/2008   • Tdap 02/13/2009        PE:  Vitals:    08/28/20 1353   BP: 145/80   Pulse: 86   SpO2: 97%      Body mass index is 29.47 kg/m².    Gen Appearance: NAD  HEENT: Normocephalic, PERRLA, no thyromegaly, trache midline  Heart: RRR, normal S1 and S2, no murmur  Lungs: CTA b/l, no wheezing, no crackles  Abdomen: Soft, non-tender, non-distended, no guarding and BSx4  MSK: Moves all extremities well, normal gait, no peripheral edema  Pulses: Palpable and equal b/l  Lymph nodes: No palpable lymphadenopathy   Neuro: No focal deficits      Current Outpatient Medications   Medication Sig Dispense Refill   • amLODIPine (NORVASC) 10 MG tablet Take 1 tablet by mouth Daily. 90 tablet 3   • aspirin 81 MG EC tablet Take 1 tablet by mouth Daily. 90 tablet 3   • carvedilol (COREG) 12.5 MG tablet Take 1 tablet by mouth 2 (Two) Times a Day With Meals. 180 tablet 3   • Insulin Glargine (BASAGLAR KWIKPEN) 100 UNIT/ML injection pen Inject 100 Units under the skin " into the appropriate area as directed Daily. 10 pen 11   • losartan (Cozaar) 50 MG tablet Take 1 tablet by mouth Daily. 90 tablet 3   • metFORMIN (GLUCOPHAGE) 1000 MG tablet Take 1 tablet by mouth 2 (Two) Times a Day With Meals. 120 tablet 5   • Multiple Vitamins-Minerals (CENTRUM SILVER PO) Take  by mouth Daily.     • sildenafil (Viagra) 100 MG tablet Take 1 tablet by mouth Daily As Needed for Erectile Dysfunction. 10 tablet 3   • rosuvastatin (CRESTOR) 10 MG tablet Take 1 tablet by mouth Every Night. 90 tablet 0     No current facility-administered medications for this visit.         Major was seen today for consult.    Diagnoses and all orders for this visit:    Essential hypertension  Elevated today but pt is anxious about his daughter's condition. Will be requesting records from hematology. He has a chronic thrombocytosis which may be what he is talking about. Will request records from Dr. Davis.  Type 2 diabetes mellitus with hyperglycemia, with long-term current use of insulin (CMS/[facility])  Stable. CCM. A1c well controlled.  Hyperlipidemia LDL goal <70  Cont statin. No myalgias.        Return in about 3 months (around 11/28/2020).     Please note that portions of this document were completed with a voice recognition program. Efforts were made to edit the dictations, but occasionally words are mis-transcribed.

## 2020-10-23 ENCOUNTER — OFFICE VISIT (OUTPATIENT)
Dept: FAMILY MEDICINE CLINIC | Facility: CLINIC | Age: 65
End: 2020-10-23

## 2020-10-23 VITALS
DIASTOLIC BLOOD PRESSURE: 80 MMHG | OXYGEN SATURATION: 98 % | SYSTOLIC BLOOD PRESSURE: 162 MMHG | HEIGHT: 67 IN | BODY MASS INDEX: 29.51 KG/M2 | WEIGHT: 188 LBS | HEART RATE: 94 BPM

## 2020-10-23 DIAGNOSIS — I10 ESSENTIAL HYPERTENSION: Primary | ICD-10-CM

## 2020-10-23 DIAGNOSIS — E11.9 TYPE 2 DIABETES MELLITUS WITHOUT COMPLICATION, WITH LONG-TERM CURRENT USE OF INSULIN (HCC): ICD-10-CM

## 2020-10-23 DIAGNOSIS — E78.5 HYPERLIPIDEMIA LDL GOAL <70: ICD-10-CM

## 2020-10-23 DIAGNOSIS — Z79.4 TYPE 2 DIABETES MELLITUS WITHOUT COMPLICATION, WITH LONG-TERM CURRENT USE OF INSULIN (HCC): ICD-10-CM

## 2020-10-23 PROCEDURE — 99214 OFFICE O/P EST MOD 30 MIN: CPT | Performed by: INTERNAL MEDICINE

## 2020-10-23 PROCEDURE — 90471 IMMUNIZATION ADMIN: CPT | Performed by: INTERNAL MEDICINE

## 2020-10-23 PROCEDURE — 90686 IIV4 VACC NO PRSV 0.5 ML IM: CPT | Performed by: INTERNAL MEDICINE

## 2020-10-23 RX ORDER — LOSARTAN POTASSIUM 100 MG/1
100 TABLET ORAL DAILY
Qty: 90 TABLET | Refills: 1 | Status: SHIPPED | OUTPATIENT
Start: 2020-10-23 | End: 2021-07-13 | Stop reason: SDUPTHER

## 2020-10-23 NOTE — PROGRESS NOTES
Chief Complaint   Patient presents with   • Hypertension   • Diabetes       HPI:  Gal Pierce is a 65 y.o. male who presents today for follow-up.  Sugar drinks at home well-controlled.  He is taking blood pressure medication daily as prescribed.  No blood pressure checks at home.  He takes Crestor for hyperlipidemia, no myalgias.    ROS:  Constitutional: no fevers, night sweats or unexplained weight loss  Eyes: no vision changes  ENT: no runny nose, ear pain, sore throat  Cardio: no chest pain, palpitations  Pulm: no shortness of breath, wheezing, or cough  GI: no abdominal pain or changes in bowel movements  : no difficulty urinating  MSK: no difficulty ambulating, no joint pain  Neuro: no weakness, dizziness or headache  Psych: no trouble sleeping  Endo: no change in appetite      Past Medical History:   Diagnosis Date   • CAD (coronary artery disease)    • Diabetes mellitus (CMS/HCC)    • Hyperlipidemia    • Hypertension       Family History   Problem Relation Age of Onset   • Diabetes Mother    • Aneurysm Mother    • Hypertension Father    • Diabetes Father    • Heart attack Father    • Diabetes Sister    • No Known Problems Brother    • Diabetes Maternal Grandmother    • Hypertension Maternal Grandmother    • Diabetes Maternal Grandfather    • Hypertension Maternal Grandfather    • Diabetes Paternal Grandmother    • Hypertension Paternal Grandmother    • Diabetes Paternal Grandfather    • Hypertension Paternal Grandfather    • Diabetes Sister    • Diabetes Sister    • Cancer Sister    • Hypertension Brother    • Heart disease Brother    • Diabetes Brother    • Hypertension Brother       Social History     Socioeconomic History   • Marital status:      Spouse name: Not on file   • Number of children: Not on file   • Years of education: Not on file   • Highest education level: Not on file   Tobacco Use   • Smoking status: Former Smoker     Packs/day: 0.25     Years: 15.00     Pack years: 3.75  "  • Smokeless tobacco: Never Used   • Tobacco comment: 40 years ago   Substance and Sexual Activity   • Alcohol use: No     Frequency: Never   • Drug use: No   • Sexual activity: Defer   Social History Narrative    Caffeine: 2-3 cups coffee daily, rare soda      Allergies   Allergen Reactions   • Crestor [Rosuvastatin Calcium] Myalgia   • Hydrochlorothiazide Other (See Comments)     Pt states it makes his blood pressure \"real low\"   • Lipitor [Atorvastatin Calcium] Myalgia   • Penicillins Rash      Immunization History   Administered Date(s) Administered   • FLUAD TRI 65YR+ 02/14/2013   • Flu Vaccine Quad PF >36MO 10/04/2019   • Flulaval/Fluarix/Fluzone Quad 11/26/2014, 10/04/2019, 10/23/2020   • Pneumococcal Conjugate 13-Valent (PCV13) 08/03/2015   • Pneumococcal Polysaccharide (PPSV23) 07/14/2008   • Tdap 02/13/2009        PE:  Vitals:    10/23/20 0901   BP: 162/80   Pulse: 94   SpO2: 98%      Body mass index is 29.44 kg/m².    Gen Appearance: NAD  HEENT: Normocephalic, PERRLA, no thyromegaly, trache midline  Heart: RRR, normal S1 and S2, no murmur  Lungs: CTA b/l, no wheezing, no crackles  Abdomen: Soft, non-tender, non-distended, no guarding and BSx4  MSK: Moves all extremities well, normal gait, no peripheral edema  Pulses: Palpable and equal b/l  Lymph nodes: No palpable lymphadenopathy   Neuro: No focal deficits      Current Outpatient Medications   Medication Sig Dispense Refill   • amLODIPine (NORVASC) 10 MG tablet Take 1 tablet by mouth Daily. 90 tablet 3   • aspirin 81 MG EC tablet Take 1 tablet by mouth Daily. 90 tablet 3   • carvedilol (COREG) 12.5 MG tablet Take 1 tablet by mouth 2 (Two) Times a Day With Meals. 180 tablet 3   • Insulin Glargine (BASAGLAR KWIKPEN) 100 UNIT/ML injection pen Inject 100 Units under the skin into the appropriate area as directed Daily. 10 pen 11   • metFORMIN (GLUCOPHAGE) 1000 MG tablet Take 1 tablet by mouth 2 (Two) Times a Day With Meals. 120 tablet 5   • Multiple " Vitamins-Minerals (CENTRUM SILVER PO) Take  by mouth Daily.     • rosuvastatin (CRESTOR) 10 MG tablet Take 1 tablet by mouth Every Night. 90 tablet 0   • sildenafil (Viagra) 100 MG tablet Take 1 tablet by mouth Daily As Needed for Erectile Dysfunction. 10 tablet 3   • losartan (Cozaar) 100 MG tablet Take 1 tablet by mouth Daily. 90 tablet 1     No current facility-administered medications for this visit.         Diagnoses and all orders for this visit:    1. Essential hypertension (Primary)  Uncontrolled today, increasing losartan 100 mg daily.  See back in 4 weeks for blood pressure recheck and A1c check.  2. Type 2 diabetes mellitus without complication, with long-term current use of insulin (CMS/[facility])  Recent A1c well controlled.  Continue current dose insulin.  No episodes of hypoglycemia  3. Hyperlipidemia LDL goal <70  Continue Crestor.  Stable.  Other orders  -     Fluarix Quad >6 Months (9227-9840)  -     losartan (Cozaar) 100 MG tablet; Take 1 tablet by mouth Daily.  Dispense: 90 tablet; Refill: 1         Return in about 4 weeks (around 11/20/2020) for a1c, HTN.     Please note that portions of this document were completed with a voice recognition program. Efforts were made to edit the dictations, but occasionally words are mis-transcribed.

## 2020-10-29 DIAGNOSIS — Z79.4 TYPE 2 DIABETES MELLITUS WITH HYPERGLYCEMIA, WITH LONG-TERM CURRENT USE OF INSULIN (HCC): ICD-10-CM

## 2020-10-29 DIAGNOSIS — E11.65 TYPE 2 DIABETES MELLITUS WITH HYPERGLYCEMIA, WITH LONG-TERM CURRENT USE OF INSULIN (HCC): ICD-10-CM

## 2020-11-19 ENCOUNTER — LAB (OUTPATIENT)
Dept: LAB | Facility: HOSPITAL | Age: 65
End: 2020-11-19

## 2020-11-19 ENCOUNTER — OFFICE VISIT (OUTPATIENT)
Dept: FAMILY MEDICINE CLINIC | Facility: CLINIC | Age: 65
End: 2020-11-19

## 2020-11-19 ENCOUNTER — TELEPHONE (OUTPATIENT)
Dept: FAMILY MEDICINE CLINIC | Facility: CLINIC | Age: 65
End: 2020-11-19

## 2020-11-19 VITALS
WEIGHT: 189 LBS | OXYGEN SATURATION: 98 % | SYSTOLIC BLOOD PRESSURE: 154 MMHG | BODY MASS INDEX: 29.66 KG/M2 | DIASTOLIC BLOOD PRESSURE: 82 MMHG | HEIGHT: 67 IN | HEART RATE: 89 BPM

## 2020-11-19 DIAGNOSIS — I10 ESSENTIAL HYPERTENSION: ICD-10-CM

## 2020-11-19 DIAGNOSIS — E11.9 TYPE 2 DIABETES MELLITUS WITHOUT COMPLICATION, WITH LONG-TERM CURRENT USE OF INSULIN (HCC): Primary | ICD-10-CM

## 2020-11-19 DIAGNOSIS — D75.1 POLYCYTHEMIA: ICD-10-CM

## 2020-11-19 DIAGNOSIS — Z79.4 TYPE 2 DIABETES MELLITUS WITHOUT COMPLICATION, WITH LONG-TERM CURRENT USE OF INSULIN (HCC): Primary | ICD-10-CM

## 2020-11-19 LAB
BASOPHILS # BLD AUTO: 0.08 10*3/MM3 (ref 0–0.2)
BASOPHILS NFR BLD AUTO: 1.2 % (ref 0–1.5)
DEPRECATED RDW RBC AUTO: 48 FL (ref 37–54)
EOSINOPHIL # BLD AUTO: 0.39 10*3/MM3 (ref 0–0.4)
EOSINOPHIL NFR BLD AUTO: 5.9 % (ref 0.3–6.2)
ERYTHROCYTE [DISTWIDTH] IN BLOOD BY AUTOMATED COUNT: 15.7 % (ref 12.3–15.4)
HBA1C MFR BLD: 6.1 %
HCT VFR BLD AUTO: 51.7 % (ref 37.5–51)
HGB BLD-MCNC: 17.2 G/DL (ref 13–17.7)
IMM GRANULOCYTES # BLD AUTO: 0.02 10*3/MM3 (ref 0–0.05)
IMM GRANULOCYTES NFR BLD AUTO: 0.3 % (ref 0–0.5)
LYMPHOCYTES # BLD AUTO: 2.13 10*3/MM3 (ref 0.7–3.1)
LYMPHOCYTES NFR BLD AUTO: 32 % (ref 19.6–45.3)
MCH RBC QN AUTO: 28.8 PG (ref 26.6–33)
MCHC RBC AUTO-ENTMCNC: 33.3 G/DL (ref 31.5–35.7)
MCV RBC AUTO: 86.5 FL (ref 79–97)
MONOCYTES # BLD AUTO: 0.61 10*3/MM3 (ref 0.1–0.9)
MONOCYTES NFR BLD AUTO: 9.2 % (ref 5–12)
NEUTROPHILS NFR BLD AUTO: 3.42 10*3/MM3 (ref 1.7–7)
NEUTROPHILS NFR BLD AUTO: 51.4 % (ref 42.7–76)
NRBC BLD AUTO-RTO: 0.2 /100 WBC (ref 0–0.2)
PATHOLOGY REVIEW: YES
PLATELET # BLD AUTO: 547 10*3/MM3 (ref 140–450)
PMV BLD AUTO: 9.5 FL (ref 6–12)
RBC # BLD AUTO: 5.98 10*6/MM3 (ref 4.14–5.8)
WBC # BLD AUTO: 6.65 10*3/MM3 (ref 3.4–10.8)

## 2020-11-19 PROCEDURE — 85025 COMPLETE CBC W/AUTO DIFF WBC: CPT

## 2020-11-19 PROCEDURE — 99214 OFFICE O/P EST MOD 30 MIN: CPT | Performed by: INTERNAL MEDICINE

## 2020-11-19 PROCEDURE — 83036 HEMOGLOBIN GLYCOSYLATED A1C: CPT | Performed by: INTERNAL MEDICINE

## 2020-11-19 PROCEDURE — 36415 COLL VENOUS BLD VENIPUNCTURE: CPT

## 2020-11-19 PROCEDURE — 81270 JAK2 GENE: CPT

## 2020-11-19 NOTE — PROGRESS NOTES
Chief Complaint   Patient presents with   • Diabetes     a1c check   • Hypertension     144 80 at home this morning        HPI:  Gal Pierce is a 65 y.o. male who presents today for follow-up diabetes and hypertension.  Fasting sugars well controlled.  Take medication daily.  Blood pressure checks at home typically 140/80 on average.  Overall feels well today.  He is asking for referral to hematologist for history of polycythemia.  Reports his daughter was recently diagnosed with blood clots and polycythemia as well.    ROS:  Constitutional: no fevers, night sweats or unexplained weight loss  Eyes: no vision changes  ENT: no runny nose, ear pain, sore throat  Cardio: no chest pain, palpitations  Pulm: no shortness of breath, wheezing, or cough  GI: no abdominal pain or changes in bowel movements  : no difficulty urinating  MSK: no difficulty ambulating, no joint pain  Neuro: no weakness, dizziness or headache  Psych: no trouble sleeping  Endo: no change in appetite      Past Medical History:   Diagnosis Date   • CAD (coronary artery disease)    • Diabetes mellitus (CMS/[facility])    • Hyperlipidemia    • Hypertension       Family History   Problem Relation Age of Onset   • Diabetes Mother    • Aneurysm Mother    • Hypertension Father    • Diabetes Father    • Heart attack Father    • Diabetes Sister    • No Known Problems Brother    • Diabetes Maternal Grandmother    • Hypertension Maternal Grandmother    • Diabetes Maternal Grandfather    • Hypertension Maternal Grandfather    • Diabetes Paternal Grandmother    • Hypertension Paternal Grandmother    • Diabetes Paternal Grandfather    • Hypertension Paternal Grandfather    • Diabetes Sister    • Diabetes Sister    • Cancer Sister    • Hypertension Brother    • Heart disease Brother    • Diabetes Brother    • Hypertension Brother       Social History     Socioeconomic History   • Marital status:      Spouse name: Not on file   • Number of children: Not on  "file   • Years of education: Not on file   • Highest education level: Not on file   Tobacco Use   • Smoking status: Former Smoker     Packs/day: 0.25     Years: 15.00     Pack years: 3.75   • Smokeless tobacco: Never Used   • Tobacco comment: 40 years ago   Substance and Sexual Activity   • Alcohol use: No     Frequency: Never   • Drug use: No   • Sexual activity: Defer   Social History Narrative    Caffeine: 2-3 cups coffee daily, rare soda      Allergies   Allergen Reactions   • Crestor [Rosuvastatin Calcium] Myalgia   • Hydrochlorothiazide Other (See Comments)     Pt states it makes his blood pressure \"real low\"   • Lipitor [Atorvastatin Calcium] Myalgia   • Penicillins Rash      Immunization History   Administered Date(s) Administered   • FLUAD TRI 65YR+ 02/14/2013   • Flu Vaccine Quad PF >36MO 10/04/2019   • Flulaval/Fluarix/Fluzone Quad 11/26/2014, 10/04/2019, 10/23/2020   • Pneumococcal Conjugate 13-Valent (PCV13) 08/03/2015   • Pneumococcal Polysaccharide (PPSV23) 07/14/2008   • Tdap 02/13/2009        PE:  Vitals:    11/19/20 0856   BP: 154/82   Pulse: 89   SpO2: 98%      Body mass index is 29.59 kg/m².    Gen Appearance: NAD  HEENT: Normocephalic, PERRLA, no thyromegaly, trache midline  Heart: RRR, normal S1 and S2, no murmur  Lungs: CTA b/l, no wheezing, no crackles  Abdomen: Soft, non-tender, non-distended, no guarding and BSx4  MSK: Moves all extremities well, normal gait, no peripheral edema  Pulses: Palpable and equal b/l  Lymph nodes: No palpable lymphadenopathy   Neuro: No focal deficits      Current Outpatient Medications   Medication Sig Dispense Refill   • amLODIPine (NORVASC) 10 MG tablet Take 1 tablet by mouth Daily. 90 tablet 3   • aspirin 81 MG EC tablet Take 1 tablet by mouth Daily. 90 tablet 3   • carvedilol (COREG) 12.5 MG tablet Take 1 tablet by mouth 2 (Two) Times a Day With Meals. 180 tablet 3   • Insulin Glargine (BASAGLAR KWIKPEN) 100 UNIT/ML injection pen Inject 100 Units under the " skin into the appropriate area as directed Daily. 10 pen 11   • losartan (Cozaar) 100 MG tablet Take 1 tablet by mouth Daily. 90 tablet 1   • metFORMIN (GLUCOPHAGE) 1000 MG tablet TAKE ONE TABLET BY MOUTH TWICE A DAY WITH MEALS 90 tablet 0   • Multiple Vitamins-Minerals (CENTRUM SILVER PO) Take  by mouth Daily.     • rosuvastatin (CRESTOR) 10 MG tablet Take 1 tablet by mouth Every Night. 90 tablet 0   • sildenafil (Viagra) 100 MG tablet Take 1 tablet by mouth Daily As Needed for Erectile Dysfunction. 10 tablet 3     No current facility-administered medications for this visit.         Diagnoses and all orders for this visit:    1. Type 2 diabetes mellitus without complication, with long-term current use of insulin (CMS/[facility]) (Primary)  -     POC Glycosylated Hemoglobin (Hb A1C)  A1c well controlled.  Continue current medication.  2. Polycythemia  -     CBC & Differential; Future  -     Peripheral Blood Smear; Future  -     JAK2 EXON 12-15 MUTATION ANALYSIS; Future  Checking blood work today.  Will refer to hematology for consult.  3. Essential hypertension  Stable.  Continue current medication.  Slightly elevated systolic today but relatively good readings at home.  Check back in 3 months.  Goal will be 130/80.       Return in about 3 months (around 2/19/2021) for a1c.     Please note that portions of this document were completed with a voice recognition program. Efforts were made to edit the dictations, but occasionally words are mis-transcribed.

## 2020-11-19 NOTE — TELEPHONE ENCOUNTER
PER PAUL, PATIENT HAD HIS JAK2 EXON 12-15 MUTATION ANALYSIS DONE TODAY. SHE NEEDS US TO GET AN AUTHORIZATION AS HIS INSURANCE IS REQUESTING IT OR [employer] WILL NOT GET PAID.   DATE OF SERVICE: 11/19/2020  PROCEDURE CODE: [id number]. SHE WILL NEED THE CASE NUMBER, WHO WE TALKED TO AND ANOTHER NOTES THAT PERTAINS TO THE AUTH. [facility] TAX ID: [id number], NPI: [id number]

## 2020-11-19 NOTE — TELEPHONE ENCOUNTER
It's for polycythemia work-up. Hb >16.5 and hematocrit >49%. Do they need more information? His daughter was recently diagnosed with this and had a DVT/PE.

## 2020-11-20 LAB
LAB AP CASE REPORT: NORMAL
PATH REPORT.FINAL DX SPEC: NORMAL

## 2020-11-27 LAB
JAK2 P.V617F BLD/T QL: ABNORMAL
LAB DIRECTOR NAME PROVIDER: ABNORMAL
LABORATORY COMMENT REPORT: ABNORMAL
REFLEX: ABNORMAL
SPECIMEN PREPARATION: ABNORMAL

## 2020-12-14 ENCOUNTER — CONSULT (OUTPATIENT)
Dept: ONCOLOGY | Facility: CLINIC | Age: 65
End: 2020-12-14

## 2020-12-14 VITALS
WEIGHT: 189 LBS | HEIGHT: 67 IN | HEART RATE: 98 BPM | TEMPERATURE: 97.1 F | DIASTOLIC BLOOD PRESSURE: 92 MMHG | OXYGEN SATURATION: 98 % | BODY MASS INDEX: 29.66 KG/M2 | SYSTOLIC BLOOD PRESSURE: 161 MMHG | RESPIRATION RATE: 18 BRPM

## 2020-12-14 DIAGNOSIS — D75.1 POLYCYTHEMIA: Primary | ICD-10-CM

## 2020-12-14 PROCEDURE — 99204 OFFICE O/P NEW MOD 45 MIN: CPT | Performed by: INTERNAL MEDICINE

## 2020-12-14 NOTE — PROGRESS NOTES
DATE OF CONSULTATION: 12/14/2020    REFERRING PHYSICIAN: Umer Tovar DO    Dear Umer David, DO  Thank you for asking for my medical advice on this patient. I saw him in the  [place] office on 12/14/2020    REASON FOR CONSULTATION: Primary polycythemia    HISTORY OF PRESENT ILLNESS: The patient is a very pleasant 65 y.o.  male who was in his usual state of health until August 2020.  The patient had a blood work done that revealed polycythemia with thrombocythemia.  This was probably repeated in 3 months that revealed the same abnormality.  Dr. Tovar checked JAK2 mutation that came back positive.  The patient was referred to me for further recommendations.    SUBJECTIVE: When I saw the patient today he is here by himself.  He has never been diagnosed with blood clots before.  He has seen Dr. Talley more than 10 years ago and he was told that he had high blood level never was on phlebotomy rather he took a pill which I am suspecting it was hydroxyurea for a short period of time and then his numbers been back to normal and he has not been taking anything since then.    Review of Systems   Constitutional: Negative for activity change, appetite change, chills, fatigue, fever and unexpected weight change.   HENT: Negative for hearing loss, mouth sores, nosebleeds, sore throat and trouble swallowing.    Eyes: Negative for visual disturbance.   Respiratory: Negative for cough, chest tightness, shortness of breath and wheezing.    Cardiovascular: Negative for chest pain, palpitations and leg swelling.   Gastrointestinal: Negative for abdominal distention, abdominal pain, blood in stool, constipation, diarrhea, nausea, rectal pain and vomiting.   Endocrine: Negative for cold intolerance and heat intolerance.   Genitourinary: Negative for difficulty urinating, dysuria, frequency and urgency.   Musculoskeletal: Negative for arthralgias, back pain, gait problem, joint swelling and myalgias.   Skin:  Negative for rash.   Neurological: Negative for dizziness, tremors, syncope, weakness, light-headedness, numbness and headaches.   Hematological: Negative for adenopathy. Does not bruise/bleed easily.   Psychiatric/Behavioral: Negative for confusion, sleep disturbance and suicidal ideas. The patient is not nervous/anxious.        Past Medical History:   Diagnosis Date   • CAD (coronary artery disease)    • Diabetes mellitus (CMS/HCC)    • Heart attack (CMS/HCC)    • Hyperlipidemia    • Hypertension        Social History     Socioeconomic History   • Marital status:      Spouse name: Not on file   • Number of children: Not on file   • Years of education: Not on file   • Highest education level: Not on file   Tobacco Use   • Smoking status: Former Smoker     Packs/day: 0.25     Years: 15.00     Pack years: 3.75   • Smokeless tobacco: Never Used   • Tobacco comment: 40 years ago   Substance and Sexual Activity   • Alcohol use: No     Frequency: Never   • Drug use: No   • Sexual activity: Defer   Social History Narrative    Caffeine: 2-3 cups coffee daily, rare soda       Family History   Problem Relation Age of Onset   • Diabetes Mother    • Aneurysm Mother    • Hypertension Father    • Diabetes Father    • Heart attack Father    • Diabetes Sister    • No Known Problems Brother    • Diabetes Maternal Grandmother    • Hypertension Maternal Grandmother    • Diabetes Maternal Grandfather    • Hypertension Maternal Grandfather    • Diabetes Paternal Grandmother    • Hypertension Paternal Grandmother    • Diabetes Paternal Grandfather    • Hypertension Paternal Grandfather    • Diabetes Sister    • Diabetes Sister    • Cancer Sister    • Hypertension Brother    • Heart disease Brother    • Diabetes Brother    • Hypertension Brother        Past Surgical History:   Procedure Laterality Date   • APPENDECTOMY     • CORONARY ARTERY BYPASS GRAFT  10/29/2018    By Osman Slater with LIMA to LAD, SVG to RCA, sequential SVG  "to OM/LPL       Allergies   Allergen Reactions   • Crestor [Rosuvastatin Calcium] Myalgia   • Hydrochlorothiazide Other (See Comments)     Pt states it makes his blood pressure \"real low\"   • Lipitor [Atorvastatin Calcium] Myalgia   • Penicillins Rash          Current Outpatient Medications:   •  amLODIPine (NORVASC) 10 MG tablet, Take 1 tablet by mouth Daily., Disp: 90 tablet, Rfl: 3  •  aspirin 81 MG EC tablet, Take 1 tablet by mouth Daily., Disp: 90 tablet, Rfl: 3  •  losartan (Cozaar) 100 MG tablet, Take 1 tablet by mouth Daily., Disp: 90 tablet, Rfl: 1  •  metFORMIN (GLUCOPHAGE) 1000 MG tablet, TAKE ONE TABLET BY MOUTH TWICE A DAY WITH MEALS, Disp: 90 tablet, Rfl: 0  •  carvedilol (COREG) 12.5 MG tablet, Take 1 tablet by mouth 2 (Two) Times a Day With Meals., Disp: 180 tablet, Rfl: 3  •  Insulin Glargine (BASAGLAR KWIKPEN) 100 UNIT/ML injection pen, Inject 100 Units under the skin into the appropriate area as directed Daily., Disp: 10 pen, Rfl: 11  •  Multiple Vitamins-Minerals (CENTRUM SILVER PO), Take  by mouth Daily., Disp: , Rfl:   •  rosuvastatin (CRESTOR) 10 MG tablet, Take 1 tablet by mouth Every Night., Disp: 90 tablet, Rfl: 0  •  sildenafil (Viagra) 100 MG tablet, Take 1 tablet by mouth Daily As Needed for Erectile Dysfunction., Disp: 10 tablet, Rfl: 3    PHYSICAL EXAMINATION:   /92   Pulse 98   Temp 97.1 °F (36.2 °C) (Temporal)   Resp 18   Ht 170.2 cm (67.01\")   Wt 85.7 kg (189 lb)   SpO2 98%   BMI 29.59 kg/m²   Pain Score    12/14/20 1024   PainSc: 0-No pain       ECOG Performance Status: 1 - Symptomatic but completely ambulatory  General Appearance:  alert, cooperative, no apparent distress and appears stated age   Neurologic/Psychiatric: A&O x 3, gait steady, appropriate affect, strength 5/5 in all muscle groups   HEENT:  Normocephalic, without obvious abnormality, mucous membranes moist   Neck: Supple, symmetrical, trachea midline, no adenopathy;  No thyromegaly, masses, or tenderness "   Lungs:   Clear to auscultation bilaterally; respirations regular, even, and unlabored bilaterally   Heart:  Regular rate and rhythm, no murmurs appreciated   Abdomen:   Soft, non-tender, non-distended and no organomegaly   Lymph nodes: No cervical, supraclavicular, inguinal or axillary adenopathy noted   Extremities: Normal, atraumatic; no clubbing, cyanosis, or edema    Skin: No rashes, ulcers, or suspicious lesions noted       No visits with results within 2 Week(s) from this visit.   Latest known visit with results is:   Lab on 11/19/2020   Component Date Value Ref Range Status   • Pathology Review 11/19/2020 Yes   Final   • JAK2 V617F Mutation 11/19/2020 Comment*  Final    Result:  POSITIVE for the detection of the V617F mutation.  Interpretation:  The assay detected the presence of a G to T  nucleotide change encoding the V617F mutation within JAK2.  Interpretation of this result should be made in the context of other  clinical, morphologic, and cytogenetic findings.   • Background: 11/19/2020 Comment   Final    Comment: JAK2 is a cytoplasmic tyrosine kinase with a key role in signal  transduction from multiple hematopoietic growth factor receptors. A  point mutation within exon 14 of the JAK2 gene ([id number]) encoding a  valine to phenylalanine substitution at position 617 of the JAK2  protein (V617F) has been identified in most patients with polycythemia  vera, and in about half of those with either essential thrombocythemia  or idiopathic myelofibrosis. The V617F has also been detected,  although infrequently, in other myeloid disorders such as chronic  myelomonocytic leukemia and chronic neutrophilic luekemia. V617F is  an acquired mutation that alters a highly conserved valine present in  the negative regulatory JH2 domain of the JAK2 protein and is  predicted to dysregulate kinase activity.  Methodology:  Total genomic DNA was extracted and subjected to TaqMan real-time PCR  amplification/detection. Two  amplification products per sample were  monitored by real-time PCR using primers/probes specific                            to JAK2 wild  type (WT) and JAK2 mutant V617F. The [id number] Absolute Quantitation  software will compare the patient specimen valuse to the standard  curves and generate percent values for wild type and mutant type.  In vitro studies have indicated that this assay has an analytical  sensitivity of 1%.  References:  Conrad EJ, Ricardo LM, Max PJ, et al. Acquired mutation of the  tyrosine kinase JAK2 in human myeloproliferative disorders. Lancet.  2005 Mar 19-25; 365(0783):7385-5278.  Abdon C, Mingo V, Preethi Pena CHANELL. A unique clonal JAK2 mutation leading  to constitutive signaling causes polycythaemia vera. Nature. 2005 Apr 28; 906(8186):2122-6001.  Chan R, Yannick F, Lucero AS, et al. A gain-of-function  mutation of JAK2 in myeloproliferative disorders. N Engl J Med. 2005  Apr 28; 352(85):2331-6484.   • Director Review 11/19/2020 Comment   Final    Ondina Rosas, PhD, [facility]                 Director, Molecular Genetics                 [facility]                 Biology and Pathology                 [place], [place]                 [phone]  This test was developed and its performance characteristics  determined by [facility]. It has not been cleared or approved  by the Food and Drug Administration.   • Reflex 11/19/2020 Comment   Final    Reflex to JAK2 Exon 12-15 Mutation Analysis is not indicated.   • Extraction 11/19/2020 Completed   Final   • WBC 11/19/2020 6.65  3.40 - 10.80 10*3/mm3 Final   • RBC 11/19/2020 5.98* 4.14 - 5.80 10*6/mm3 Final   • Hemoglobin 11/19/2020 17.2  13.0 - 17.7 g/dL Final   • Hematocrit 11/19/2020 51.7* 37.5 - 51.0 % Final   • MCV 11/19/2020 86.5  79.0 - 97.0 fL Final   • MCH 11/19/2020 28.8  26.6 - 33.0 pg Final   • MCHC 11/19/2020 33.3  31.5 - 35.7 g/dL Final   • RDW 11/19/2020 15.7* 12.3 - 15.4 % Final   • RDW-SD 11/19/2020 48.0  37.0  - 54.0 fl Final   • MPV 11/19/2020 9.5  6.0 - 12.0 fL Final   • Platelets 11/19/2020 547* 140 - 450 10*3/mm3 Final   • Neutrophil % 11/19/2020 51.4  42.7 - 76.0 % Final   • Lymphocyte % 11/19/2020 32.0  19.6 - 45.3 % Final   • Monocyte % 11/19/2020 9.2  5.0 - 12.0 % Final   • Eosinophil % 11/19/2020 5.9  0.3 - 6.2 % Final   • Basophil % 11/19/2020 1.2  0.0 - 1.5 % Final   • Immature Grans % 11/19/2020 0.3  0.0 - 0.5 % Final   • Neutrophils, Absolute 11/19/2020 3.42  1.70 - 7.00 10*3/mm3 Final   • Lymphocytes, Absolute 11/19/2020 2.13  0.70 - 3.10 10*3/mm3 Final   • Monocytes, Absolute 11/19/2020 0.61  0.10 - 0.90 10*3/mm3 Final   • Eosinophils, Absolute 11/19/2020 0.39  0.00 - 0.40 10*3/mm3 Final   • Basophils, Absolute 11/19/2020 0.08  0.00 - 0.20 10*3/mm3 Final   • Immature Grans, Absolute 11/19/2020 0.02  0.00 - 0.05 10*3/mm3 Final   • nRBC 11/19/2020 0.2  0.0 - 0.2 /100 WBC Final   • Final Diagnosis 11/19/2020    Final                    Value:This result contains rich text formatting which cannot be displayed here.   • Case Report 11/19/2020    Final                    Value:Surgical Pathology Report                         Case: [id number]                                  Authorizing Provider:  Umer Tovar DO  Collected:           11/19/2020 09:40 AM          Ordering Location:     [facility]   Received:            11/19/2020 08:50 PM                                 [facility] AT                                                                                [facility]                                                              Pathologist:           Bernadine Mark MD                                                          Specimen:    Consultation Slides                                                                            No results found.      DIAGNOSTIC DATA:   1. Radiology:    CT Abdomen Pelvis WO     INDICATION:   Lower abdomen pain and urinary complaints of  low back pain. Suprapubic pain radiating to back for 6 hours     TECHNIQUE:   CT of the abdomen and pelvis without IV contrast. Coronal and sagittal reconstructions were obtained.  Radiation dose reduction techniques included automated exposure control or exposure modulation based on body size. Count of known CT and cardiac nuc  med studies performed in previous 12 months: 0.      COMPARISON:   None available.     FINDINGS:  Abdomen: Lung bases are clear. Liver, gallbladder, spleen, pancreas and adrenal glands are normal. Both kidneys show mild perinephric stranding and there are 2 tiny 1 mm stones in the right kidney. There is mild enlargement of the infrarenal aorta  measuring 2.6 cm in diameter. No adenopathy is identified. The bowel is normal.     Pelvis: The bladder and prostate gland are normal. Bones are unremarkable.     IMPRESSION:  Bilateral stranding around the kidneys. This could be due to pyelonephritis and clinical correlation is recommended.     2 tiny nonobstructing stones right kidney     Mild enlargement of the infrarenal abdominal aorta measuring 2.6 m in diameter.     2. Dr. Tovar's note reviewed by me and documented in the  chart.     3. Pathology report: None available  4. Laboratory data:    Results for KATHY MILLER (MRN [id number]) as of 12/14/2020 11:07   Ref. Range 8/10/2020 12:13 11/19/2020 09:40   WBC Latest Ref Range: 3.40 - 10.80 10*3/mm3 5.76 6.65   RBC Latest Ref Range: 4.14 - 5.80 10*6/mm3 5.85 (H) 5.98 (H)   Hemoglobin Latest Ref Range: 13.0 - 17.7 g/dL 17.1 17.2   Hematocrit Latest Ref Range: 37.5 - 51.0 % 51.8 (H) 51.7 (H)   RDW Latest Ref Range: 12.3 - 15.4 % 15.1 15.7 (H)   MCV Latest Ref Range: 79.0 - 97.0 fL 88.5 86.5   MCH Latest Ref Range: 26.6 - 33.0 pg 29.2 28.8   MCHC Latest Ref Range: 31.5 - 35.7 g/dL 33.0 33.3   MPV Latest Ref Range: 6.0 - 12.0 fL 9.8 9.5   Platelets Latest Ref Range: 140 - 450 10*3/mm3 497 (H) 547 (H)   RDW-SD Latest Ref Range: 37.0 - 54.0 fl  48.7 48.0       ASSESSMENT: The patient is a very pleasant 65 y.o.  male  with polycythemia vera    PROBLEM LIST:   1.  Polycythemia vera:  A.  Presented with asymptomatic elevated hemoglobin hematocrit and platelets August 2020  B.  JAK2 V6 17F mutation  2.  Thrombocytosis  3.  Type 2 diabetes  4.  Hypertension  5.  Hypercholesterolemia    PLAN:   1. I had a long discussion today with the patient about his  new diagnosis of polycythemia. I reviewed the patient's documents including refereing provider's notes, lab results, imaging report.   2.  The patient will be started on phlebotomy once every 2 weeks to target hematocrit less than 45%.  3.  I will start patient on aspirin 80 mg daily per  4.  We will hold off on starting cytoreductive therapy since patient never had a blood clot before and his age less than 67.  He does have multiple high risk features including type 2 diabetes hypertension hypercholesteremia.  I am concerned about his thrombocytosis which I do not think it will get better with phlebotomy rather it might get worse secondary to iron deficiency that would be induced by phlebotomy.  I will have very low threshold to add Hydrea 500 mg daily.  5.  Future treatment options would include hydroxyurea followed by check of 5.  6.  The patient was made aware about the significant increased risk of death if we cannot maintain his hematocrit less than 45.  The patient has multiple coronary artery disease risk factors including hypertension type 2 diabetes and hypercholesterolemia.  7.  The patient will follow-up with me in 3 months repeat CBC.  8.  We will continue Norvasc Coreg and Cozaar for hypertension.  9.  We will continue insulin and metformin for type 2 diabetes per  10.  We will continue Crestor 10 mg daily for hypercholesterolemia.  Annabelle Lea MD  12/14/2020

## 2020-12-16 ENCOUNTER — HOSPITAL ENCOUNTER (OUTPATIENT)
Dept: ONCOLOGY | Facility: HOSPITAL | Age: 65
Setting detail: INFUSION SERIES
Discharge: HOME | End: 2020-12-16

## 2020-12-16 VITALS
TEMPERATURE: 98 F | WEIGHT: 191 LBS | SYSTOLIC BLOOD PRESSURE: 145 MMHG | HEIGHT: 67 IN | HEART RATE: 91 BPM | RESPIRATION RATE: 20 BRPM | BODY MASS INDEX: 29.98 KG/M2 | DIASTOLIC BLOOD PRESSURE: 76 MMHG

## 2020-12-16 DIAGNOSIS — D75.1 POLYCYTHEMIA: Primary | ICD-10-CM

## 2020-12-16 LAB
HCT VFR BLD AUTO: 51.6 % (ref 37.5–51)
HGB BLD-MCNC: 16.8 G/DL (ref 13–17.7)

## 2020-12-16 PROCEDURE — 85018 HEMOGLOBIN: CPT | Performed by: INTERNAL MEDICINE

## 2020-12-16 PROCEDURE — 36415 COLL VENOUS BLD VENIPUNCTURE: CPT

## 2020-12-16 PROCEDURE — 99195 PHLEBOTOMY: CPT

## 2020-12-16 PROCEDURE — 85014 HEMATOCRIT: CPT | Performed by: INTERNAL MEDICINE

## 2020-12-29 ENCOUNTER — HOSPITAL ENCOUNTER (OUTPATIENT)
Dept: ONCOLOGY | Facility: HOSPITAL | Age: 65
Setting detail: INFUSION SERIES
Discharge: HOME | End: 2020-12-29

## 2020-12-29 VITALS
DIASTOLIC BLOOD PRESSURE: 64 MMHG | TEMPERATURE: 97.3 F | RESPIRATION RATE: 16 BRPM | BODY MASS INDEX: 29.84 KG/M2 | WEIGHT: 190.5 LBS | HEART RATE: 82 BPM | SYSTOLIC BLOOD PRESSURE: 152 MMHG

## 2020-12-29 DIAGNOSIS — D75.1 POLYCYTHEMIA: Primary | ICD-10-CM

## 2020-12-29 LAB
ERYTHROCYTE [DISTWIDTH] IN BLOOD BY AUTOMATED COUNT: 16.9 % (ref 12.3–15.4)
HCT VFR BLD AUTO: 48 % (ref 37.5–51)
HGB BLD-MCNC: 15.6 G/DL (ref 13–17.7)
LYMPHOCYTES # BLD AUTO: 2.6 10*3/MM3 (ref 0.7–3.1)
LYMPHOCYTES NFR BLD AUTO: 36.7 % (ref 19.6–45.3)
MCH RBC QN AUTO: 29.7 PG (ref 26.6–33)
MCHC RBC AUTO-ENTMCNC: 32.6 G/DL (ref 31.5–35.7)
MCV RBC AUTO: 91.2 FL (ref 79–97)
MONOCYTES # BLD AUTO: 0.3 10*3/MM3 (ref 0.1–0.9)
MONOCYTES NFR BLD AUTO: 4.7 % (ref 5–12)
NEUTROPHILS NFR BLD AUTO: 4.2 10*3/MM3 (ref 1.7–7)
NEUTROPHILS NFR BLD AUTO: 58.6 % (ref 42.7–76)
PLATELET # BLD AUTO: 537 10*3/MM3 (ref 140–450)
PMV BLD AUTO: 7.1 FL (ref 6–12)
RBC # BLD AUTO: 5.26 10*6/MM3 (ref 4.14–5.8)
WBC # BLD AUTO: 7.1 10*3/MM3 (ref 3.4–10.8)

## 2020-12-29 PROCEDURE — 85025 COMPLETE CBC W/AUTO DIFF WBC: CPT | Performed by: INTERNAL MEDICINE

## 2020-12-29 PROCEDURE — 99195 PHLEBOTOMY: CPT

## 2021-01-12 ENCOUNTER — HOSPITAL ENCOUNTER (OUTPATIENT)
Dept: ONCOLOGY | Facility: HOSPITAL | Age: 66
Setting detail: INFUSION SERIES
Discharge: HOME | End: 2021-01-12

## 2021-01-12 VITALS
SYSTOLIC BLOOD PRESSURE: 173 MMHG | DIASTOLIC BLOOD PRESSURE: 81 MMHG | BODY MASS INDEX: 30.45 KG/M2 | WEIGHT: 194 LBS | TEMPERATURE: 98.1 F | RESPIRATION RATE: 16 BRPM | HEIGHT: 67 IN | HEART RATE: 92 BPM

## 2021-01-12 DIAGNOSIS — D75.1 POLYCYTHEMIA: Primary | ICD-10-CM

## 2021-01-12 LAB
ERYTHROCYTE [DISTWIDTH] IN BLOOD BY AUTOMATED COUNT: 18.2 % (ref 12.3–15.4)
HCT VFR BLD AUTO: 49.5 % (ref 37.5–51)
HGB BLD-MCNC: 16 G/DL (ref 13–17.7)
LYMPHOCYTES # BLD AUTO: 3.1 10*3/MM3 (ref 0.7–3.1)
LYMPHOCYTES NFR BLD AUTO: 40.7 % (ref 19.6–45.3)
MCH RBC QN AUTO: 30.3 PG (ref 26.6–33)
MCHC RBC AUTO-ENTMCNC: 32.4 G/DL (ref 31.5–35.7)
MCV RBC AUTO: 93.5 FL (ref 79–97)
MONOCYTES # BLD AUTO: 0.4 10*3/MM3 (ref 0.1–0.9)
MONOCYTES NFR BLD AUTO: 5.6 % (ref 5–12)
NEUTROPHILS NFR BLD AUTO: 4.1 10*3/MM3 (ref 1.7–7)
NEUTROPHILS NFR BLD AUTO: 53.7 % (ref 42.7–76)
PLATELET # BLD AUTO: 539 10*3/MM3 (ref 140–450)
PMV BLD AUTO: 7 FL (ref 6–12)
RBC # BLD AUTO: 5.29 10*6/MM3 (ref 4.14–5.8)
WBC # BLD AUTO: 7.6 10*3/MM3 (ref 3.4–10.8)

## 2021-01-12 PROCEDURE — 99195 PHLEBOTOMY: CPT

## 2021-01-12 PROCEDURE — 85025 COMPLETE CBC W/AUTO DIFF WBC: CPT | Performed by: INTERNAL MEDICINE

## 2021-01-12 PROCEDURE — 36415 COLL VENOUS BLD VENIPUNCTURE: CPT

## 2021-01-26 ENCOUNTER — HOSPITAL ENCOUNTER (OUTPATIENT)
Dept: ONCOLOGY | Facility: HOSPITAL | Age: 66
Setting detail: INFUSION SERIES
Discharge: HOME | End: 2021-01-26

## 2021-01-26 VITALS
HEART RATE: 99 BPM | WEIGHT: 194 LBS | RESPIRATION RATE: 20 BRPM | HEIGHT: 67 IN | BODY MASS INDEX: 30.45 KG/M2 | TEMPERATURE: 97.4 F

## 2021-01-26 DIAGNOSIS — D75.1 POLYCYTHEMIA: Primary | ICD-10-CM

## 2021-01-26 LAB
ERYTHROCYTE [DISTWIDTH] IN BLOOD BY AUTOMATED COUNT: 17.2 % (ref 12.3–15.4)
HCT VFR BLD AUTO: 48.1 % (ref 37.5–51)
HGB BLD-MCNC: 15.3 G/DL (ref 13–17.7)
MCH RBC QN AUTO: 29.2 PG (ref 26.6–33)
MCHC RBC AUTO-ENTMCNC: 31.7 G/DL (ref 31.5–35.7)
MCV RBC AUTO: 92 FL (ref 79–97)
PLATELET # BLD AUTO: 554 10*3/MM3 (ref 140–450)
PMV BLD AUTO: 6.7 FL (ref 6–12)
RBC # BLD AUTO: 5.23 10*6/MM3 (ref 4.14–5.8)
WBC # BLD AUTO: 7.1 10*3/MM3 (ref 3.4–10.8)

## 2021-01-26 PROCEDURE — 85027 COMPLETE CBC AUTOMATED: CPT | Performed by: INTERNAL MEDICINE

## 2021-01-26 PROCEDURE — 99195 PHLEBOTOMY: CPT

## 2021-02-09 ENCOUNTER — HOSPITAL ENCOUNTER (OUTPATIENT)
Dept: ONCOLOGY | Facility: HOSPITAL | Age: 66
Setting detail: INFUSION SERIES
Discharge: HOME | End: 2021-02-09

## 2021-02-09 VITALS
HEART RATE: 93 BPM | DIASTOLIC BLOOD PRESSURE: 74 MMHG | TEMPERATURE: 98.8 F | RESPIRATION RATE: 20 BRPM | SYSTOLIC BLOOD PRESSURE: 162 MMHG | HEIGHT: 67 IN | WEIGHT: 195 LBS | BODY MASS INDEX: 30.61 KG/M2

## 2021-02-09 DIAGNOSIS — D75.1 POLYCYTHEMIA: Primary | ICD-10-CM

## 2021-02-09 LAB
HCT VFR BLD AUTO: 47.2 % (ref 37.5–51)
HGB BLD-MCNC: 14.6 G/DL (ref 13–17.7)

## 2021-02-09 PROCEDURE — 36415 COLL VENOUS BLD VENIPUNCTURE: CPT

## 2021-02-09 PROCEDURE — 99195 PHLEBOTOMY: CPT

## 2021-02-09 PROCEDURE — 85018 HEMOGLOBIN: CPT | Performed by: INTERNAL MEDICINE

## 2021-02-09 PROCEDURE — 85014 HEMATOCRIT: CPT | Performed by: INTERNAL MEDICINE

## 2021-02-23 ENCOUNTER — HOSPITAL ENCOUNTER (OUTPATIENT)
Dept: ONCOLOGY | Facility: HOSPITAL | Age: 66
Setting detail: INFUSION SERIES
Discharge: HOME | End: 2021-02-23

## 2021-02-23 VITALS
WEIGHT: 191 LBS | RESPIRATION RATE: 16 BRPM | TEMPERATURE: 97.4 F | HEIGHT: 67 IN | DIASTOLIC BLOOD PRESSURE: 91 MMHG | HEART RATE: 91 BPM | SYSTOLIC BLOOD PRESSURE: 199 MMHG | BODY MASS INDEX: 29.98 KG/M2

## 2021-02-23 DIAGNOSIS — D75.1 POLYCYTHEMIA: Primary | ICD-10-CM

## 2021-02-23 LAB
HCT VFR BLD AUTO: 42.8 % (ref 37.5–51)
HGB BLD-MCNC: 13.9 G/DL (ref 13–17.7)

## 2021-02-23 PROCEDURE — 36415 COLL VENOUS BLD VENIPUNCTURE: CPT

## 2021-02-23 PROCEDURE — 85014 HEMATOCRIT: CPT | Performed by: INTERNAL MEDICINE

## 2021-02-23 PROCEDURE — 85018 HEMOGLOBIN: CPT | Performed by: INTERNAL MEDICINE

## 2021-02-24 ENCOUNTER — OFFICE VISIT (OUTPATIENT)
Dept: FAMILY MEDICINE CLINIC | Facility: CLINIC | Age: 66
End: 2021-02-24

## 2021-02-24 VITALS
OXYGEN SATURATION: 98 % | SYSTOLIC BLOOD PRESSURE: 158 MMHG | HEART RATE: 91 BPM | HEIGHT: 67 IN | DIASTOLIC BLOOD PRESSURE: 82 MMHG | BODY MASS INDEX: 30.45 KG/M2 | WEIGHT: 194 LBS

## 2021-02-24 DIAGNOSIS — E78.5 HYPERLIPIDEMIA LDL GOAL <70: ICD-10-CM

## 2021-02-24 DIAGNOSIS — I10 ESSENTIAL HYPERTENSION: ICD-10-CM

## 2021-02-24 DIAGNOSIS — D75.1 POLYCYTHEMIA: ICD-10-CM

## 2021-02-24 DIAGNOSIS — I25.119 CORONARY ARTERY DISEASE INVOLVING NATIVE CORONARY ARTERY OF NATIVE HEART WITH ANGINA PECTORIS (HCC): ICD-10-CM

## 2021-02-24 DIAGNOSIS — Z79.4 TYPE 2 DIABETES MELLITUS WITH HYPERGLYCEMIA, WITH LONG-TERM CURRENT USE OF INSULIN (HCC): Primary | ICD-10-CM

## 2021-02-24 DIAGNOSIS — E11.65 TYPE 2 DIABETES MELLITUS WITH HYPERGLYCEMIA, WITH LONG-TERM CURRENT USE OF INSULIN (HCC): Primary | ICD-10-CM

## 2021-02-24 LAB — HBA1C MFR BLD: 5.7 %

## 2021-02-24 PROCEDURE — 99214 OFFICE O/P EST MOD 30 MIN: CPT | Performed by: INTERNAL MEDICINE

## 2021-02-24 PROCEDURE — 83036 HEMOGLOBIN GLYCOSYLATED A1C: CPT | Performed by: INTERNAL MEDICINE

## 2021-02-24 RX ORDER — CARVEDILOL 25 MG/1
25 TABLET ORAL 2 TIMES DAILY WITH MEALS
Qty: 180 TABLET | Refills: 1 | Status: SHIPPED | OUTPATIENT
Start: 2021-02-24 | End: 2021-07-13 | Stop reason: SDUPTHER

## 2021-02-25 NOTE — PROGRESS NOTES
Chief Complaint   Patient presents with   • Diabetes     3 month f/u        HPI:  Gal Pierce is a 65 y.o. male who presents today for follow-up.  No acute concerns today.  Fasting sugars well controlled.  No episodes of hypoglycemia.  He is following with hematology for polycythemia.    ROS:  Constitutional: no fevers, night sweats or unexplained weight loss  Eyes: no vision changes  ENT: no runny nose, ear pain, sore throat  Cardio: no chest pain, palpitations  Pulm: no shortness of breath, wheezing, or cough  GI: no abdominal pain or changes in bowel movements  : no difficulty urinating  MSK: no difficulty ambulating, no joint pain  Neuro: no weakness, dizziness or headache  Psych: no trouble sleeping  Endo: no change in appetite      Past Medical History:   Diagnosis Date   • CAD (coronary artery disease)    • Diabetes mellitus (CMS/HCC)    • Heart attack (CMS/HCC)    • Hyperlipidemia    • Hypertension       Family History   Problem Relation Age of Onset   • Diabetes Mother    • Aneurysm Mother    • Hypertension Father    • Diabetes Father    • Heart attack Father    • Diabetes Sister    • No Known Problems Brother    • Diabetes Maternal Grandmother    • Hypertension Maternal Grandmother    • Diabetes Maternal Grandfather    • Hypertension Maternal Grandfather    • Diabetes Paternal Grandmother    • Hypertension Paternal Grandmother    • Diabetes Paternal Grandfather    • Hypertension Paternal Grandfather    • Diabetes Sister    • Diabetes Sister    • Cancer Sister    • Hypertension Brother    • Heart disease Brother    • Diabetes Brother    • Hypertension Brother       Social History     Socioeconomic History   • Marital status:      Spouse name: Not on file   • Number of children: Not on file   • Years of education: Not on file   • Highest education level: Not on file   Tobacco Use   • Smoking status: Former Smoker     Packs/day: 0.25     Years: 15.00     Pack years: 3.75   • Smokeless  "tobacco: Never Used   • Tobacco comment: 40 years ago   Substance and Sexual Activity   • Alcohol use: No     Frequency: Never   • Drug use: No   • Sexual activity: Defer   Social History Narrative    Caffeine: 2-3 cups coffee daily, rare soda      Allergies   Allergen Reactions   • Crestor [Rosuvastatin Calcium] Myalgia   • Hydrochlorothiazide Other (See Comments)     Pt states it makes his blood pressure \"real low\"   • Lipitor [Atorvastatin Calcium] Myalgia   • Penicillins Rash      Immunization History   Administered Date(s) Administered   • FLUAD TRI 65YR+ 02/14/2013   • Flu Vaccine Quad PF >36MO 10/04/2019   • Flulaval/Fluarix/Fluzone Quad 11/26/2014, 10/04/2019, 10/23/2020   • Pneumococcal Conjugate 13-Valent (PCV13) 08/03/2015   • Pneumococcal Polysaccharide (PPSV23) 07/14/2008   • Tdap 02/13/2009        PE:  Vitals:    02/24/21 0904   BP: 158/82   Pulse: 91   SpO2: 98%      Body mass index is 30.38 kg/m².    Gen Appearance: NAD  HEENT: Normocephalic, PERRLA, no thyromegaly, trache midline  Heart: RRR, normal S1 and S2, no murmur  Lungs: CTA b/l, no wheezing, no crackles  Abdomen: Soft, non-tender, non-distended, no guarding and BSx4  MSK: Moves all extremities well, normal gait, no peripheral edema  Pulses: Palpable and equal b/l  Lymph nodes: No palpable lymphadenopathy   Neuro: No focal deficits      Current Outpatient Medications   Medication Sig Dispense Refill   • amLODIPine (NORVASC) 10 MG tablet Take 1 tablet by mouth Daily. 90 tablet 3   • aspirin 81 MG EC tablet Take 1 tablet by mouth Daily. 90 tablet 3   • Insulin Glargine (BASAGLAR KWIKPEN) 100 UNIT/ML injection pen Inject 100 Units under the skin into the appropriate area as directed Daily. 10 pen 11   • losartan (Cozaar) 100 MG tablet Take 1 tablet by mouth Daily. 90 tablet 1   • metFORMIN (GLUCOPHAGE) 1000 MG tablet TAKE ONE TABLET BY MOUTH TWICE A DAY WITH MEALS 90 tablet 0   • Multiple Vitamins-Minerals (CENTRUM SILVER PO) Take  by mouth " Daily.     • rosuvastatin (CRESTOR) 10 MG tablet Take 1 tablet by mouth Every Night. 90 tablet 0   • sildenafil (Viagra) 100 MG tablet Take 1 tablet by mouth Daily As Needed for Erectile Dysfunction. 10 tablet 3   • carvedilol (Coreg) 25 MG tablet Take 1 tablet by mouth 2 (Two) Times a Day With Meals. 180 tablet 1     No current facility-administered medications for this visit.         Diagnoses and all orders for this visit:    1. Type 2 diabetes mellitus with hyperglycemia, with long-term current use of insulin (CMS/[facility]) (Primary)  -     POC Glycosylated Hemoglobin (Hb A1C)  A1c well controlled.  Recheck in 3 months.  Recommend yearly eye exam.  Continue Metformin.  2. Essential hypertension  Elevated today, increase Coreg to 25 mg twice daily.  Heart rate typically in the 90s the past several office visits.  3. Polycythemia  Follow-up with hematology as scheduled.  4. Hyperlipidemia LDL goal <70  Continue Crestor.  5. Coronary artery disease involving native coronary artery of native heart with angina pectoris (CMS/[facility])  Established with cardiology.  Take medication as prescribed.  Denies chest pain.  Other orders  -     carvedilol (Coreg) 25 MG tablet; Take 1 tablet by mouth 2 (Two) Times a Day With Meals.  Dispense: 180 tablet; Refill: 1         Return in about 4 weeks (around 3/24/2021) for htn.     Please note that portions of this document were completed with a voice recognition program. Efforts were made to edit the dictations, but occasionally words are mis-transcribed.

## 2021-03-03 DIAGNOSIS — D75.1 POLYCYTHEMIA: Primary | ICD-10-CM

## 2021-03-09 ENCOUNTER — LAB (OUTPATIENT)
Dept: LAB | Facility: HOSPITAL | Age: 66
End: 2021-03-09

## 2021-03-09 ENCOUNTER — APPOINTMENT (OUTPATIENT)
Dept: ONCOLOGY | Facility: HOSPITAL | Age: 66
End: 2021-03-09

## 2021-03-09 ENCOUNTER — OFFICE VISIT (OUTPATIENT)
Dept: ONCOLOGY | Facility: CLINIC | Age: 66
End: 2021-03-09

## 2021-03-09 VITALS
BODY MASS INDEX: 30.29 KG/M2 | SYSTOLIC BLOOD PRESSURE: 154 MMHG | HEART RATE: 93 BPM | HEIGHT: 67 IN | DIASTOLIC BLOOD PRESSURE: 71 MMHG | TEMPERATURE: 97.8 F | WEIGHT: 193 LBS | RESPIRATION RATE: 18 BRPM | OXYGEN SATURATION: 96 %

## 2021-03-09 DIAGNOSIS — D75.1 POLYCYTHEMIA: Primary | ICD-10-CM

## 2021-03-09 DIAGNOSIS — D75.1 POLYCYTHEMIA: ICD-10-CM

## 2021-03-09 LAB
ERYTHROCYTE [DISTWIDTH] IN BLOOD BY AUTOMATED COUNT: 15.5 % (ref 12.3–15.4)
HCT VFR BLD AUTO: 44.3 % (ref 37.5–51)
HGB BLD-MCNC: 13.9 G/DL (ref 13–17.7)
LYMPHOCYTES # BLD AUTO: 2.5 10*3/MM3 (ref 0.7–3.1)
LYMPHOCYTES NFR BLD AUTO: 38 % (ref 19.6–45.3)
MCH RBC QN AUTO: 26.6 PG (ref 26.6–33)
MCHC RBC AUTO-ENTMCNC: 31.3 G/DL (ref 31.5–35.7)
MCV RBC AUTO: 85 FL (ref 79–97)
MONOCYTES # BLD AUTO: 0.5 10*3/MM3 (ref 0.1–0.9)
MONOCYTES NFR BLD AUTO: 7.7 % (ref 5–12)
NEUTROPHILS NFR BLD AUTO: 3.6 10*3/MM3 (ref 1.7–7)
NEUTROPHILS NFR BLD AUTO: 54.3 % (ref 42.7–76)
PLATELET # BLD AUTO: 568 10*3/MM3 (ref 140–450)
PMV BLD AUTO: 7 FL (ref 6–12)
RBC # BLD AUTO: 5.21 10*6/MM3 (ref 4.14–5.8)
WBC # BLD AUTO: 6.7 10*3/MM3 (ref 3.4–10.8)

## 2021-03-09 PROCEDURE — 36415 COLL VENOUS BLD VENIPUNCTURE: CPT

## 2021-03-09 PROCEDURE — 85025 COMPLETE CBC W/AUTO DIFF WBC: CPT

## 2021-03-09 PROCEDURE — 99214 OFFICE O/P EST MOD 30 MIN: CPT | Performed by: INTERNAL MEDICINE

## 2021-03-09 RX ORDER — CLOPIDOGREL BISULFATE 75 MG/1
75 TABLET ORAL DAILY
COMMUNITY
Start: 2021-03-08 | End: 2021-07-13 | Stop reason: SDUPTHER

## 2021-03-09 NOTE — PROGRESS NOTES
DATE OF VISIT: 3/9/2021    REASON FOR VISIT: Followup for primary polycythemia vera     HISTORY OF PRESENT ILLNESS: The patient is a very pleasant 65 y.o. male  with past medical history significant for polycythemia vera diagnosed August 2020.  The patient was started on phlebotomy December 2020.  The  patient is here today for scheduled follow-up visit.    SUBJECTIVE: The patient has been doing fairly well. he was able to tolerate  his treatment without any serious side effects. he denied any fever or  chills, no night sweats, denied any headaches    PAST MEDICAL HISTORY/SOCIAL HISTORY/FAMILY HISTORY: Reviewed by me and unchanged from my documentation done on 03/09/21.    Review of Systems   Constitutional: Negative for activity change, appetite change, chills, fatigue, fever and unexpected weight change.   HENT: Negative for hearing loss, mouth sores, nosebleeds, sore throat and trouble swallowing.    Eyes: Negative for visual disturbance.   Respiratory: Negative for cough, chest tightness, shortness of breath and wheezing.    Cardiovascular: Negative for chest pain, palpitations and leg swelling.   Gastrointestinal: Negative for abdominal distention, abdominal pain, blood in stool, constipation, diarrhea, nausea, rectal pain and vomiting.   Endocrine: Negative for cold intolerance and heat intolerance.   Genitourinary: Negative for difficulty urinating, dysuria, frequency and urgency.   Musculoskeletal: Negative for arthralgias, back pain, gait problem, joint swelling and myalgias.   Skin: Negative for rash.   Neurological: Negative for dizziness, tremors, syncope, weakness, light-headedness, numbness and headaches.   Hematological: Negative for adenopathy. Does not bruise/bleed easily.   Psychiatric/Behavioral: Negative for confusion, sleep disturbance and suicidal ideas. The patient is not nervous/anxious.          Current Outpatient Medications:   •  amLODIPine (NORVASC) 10 MG tablet, Take 1 tablet by mouth  "Daily., Disp: 90 tablet, Rfl: 3  •  aspirin 81 MG EC tablet, Take 1 tablet by mouth Daily., Disp: 90 tablet, Rfl: 3  •  carvedilol (Coreg) 25 MG tablet, Take 1 tablet by mouth 2 (Two) Times a Day With Meals., Disp: 180 tablet, Rfl: 1  •  clopidogrel (PLAVIX) 75 MG tablet, , Disp: , Rfl:   •  Insulin Glargine (BASAGLAR KWIKPEN) 100 UNIT/ML injection pen, Inject 100 Units under the skin into the appropriate area as directed Daily., Disp: 10 pen, Rfl: 11  •  losartan (Cozaar) 100 MG tablet, Take 1 tablet by mouth Daily., Disp: 90 tablet, Rfl: 1  •  metFORMIN (GLUCOPHAGE) 1000 MG tablet, TAKE ONE TABLET BY MOUTH TWICE A DAY WITH MEALS, Disp: 90 tablet, Rfl: 0  •  Multiple Vitamins-Minerals (CENTRUM SILVER PO), Take  by mouth Daily., Disp: , Rfl:   •  rosuvastatin (CRESTOR) 10 MG tablet, Take 1 tablet by mouth Every Night., Disp: 90 tablet, Rfl: 0  •  sildenafil (Viagra) 100 MG tablet, Take 1 tablet by mouth Daily As Needed for Erectile Dysfunction., Disp: 10 tablet, Rfl: 3    PHYSICAL EXAMINATION:   /71   Pulse 93   Temp 97.8 °F (36.6 °C) (Temporal)   Resp 18   Ht 170.2 cm (67.01\")   Wt 87.5 kg (193 lb)   SpO2 96%   BMI 30.22 kg/m²    Pain Score    03/09/21 1418   PainSc: 0-No pain                     ECOG Performance Status: 1 - Symptomatic but completely ambulatory  General Appearance:  alert, cooperative, no apparent distress and appears stated age   Neurologic/Psychiatric: A&O x 3, gait steady, appropriate affect, strength 5/5 in all muscle groups   HEENT:  Normocephalic, without obvious abnormality, mucous membranes moist   Neck: Supple, symmetrical, trachea midline, no adenopathy;  No thyromegaly, masses, or tenderness   Lungs:   Clear to auscultation bilaterally; respirations regular, even, and unlabored bilaterally   Heart:  Regular rate and rhythm, no murmurs appreciated   Abdomen:   Soft, non-tender, non-distended and no organomegaly   Lymph nodes: No cervical, supraclavicular, inguinal or " axillary adenopathy noted   Extremities: Normal, atraumatic; no clubbing, cyanosis, or edema    Skin: No rashes, ulcers, or suspicious lesions noted     Lab on 03/09/2021   Component Date Value Ref Range Status   • WBC 03/09/2021 6.70  3.40 - 10.80 10*3/mm3 Final   • RBC 03/09/2021 5.21  4.14 - 5.80 10*6/mm3 Final   • Hemoglobin 03/09/2021 13.9  13.0 - 17.7 g/dL Final   • Hematocrit 03/09/2021 44.3  37.5 - 51.0 % Final   • RDW 03/09/2021 15.5* 12.3 - 15.4 % Final   • MCV 03/09/2021 85.0  79.0 - 97.0 fL Final   • MCH 03/09/2021 26.6  26.6 - 33.0 pg Final   • MCHC 03/09/2021 31.3* 31.5 - 35.7 g/dL Final   • MPV 03/09/2021 7.0  6.0 - 12.0 fL Final   • Platelets 03/09/2021 568* 140 - 450 10*3/mm3 Final   • Neutrophil % 03/09/2021 54.3  42.7 - 76.0 % Final   • Lymphocyte % 03/09/2021 38.0  19.6 - 45.3 % Final   • Monocyte % 03/09/2021 7.7  5.0 - 12.0 % Final   • Neutrophils, Absolute 03/09/2021 3.60  1.70 - 7.00 10*3/mm3 Final   • Lymphocytes, Absolute 03/09/2021 2.50  0.70 - 3.10 10*3/mm3 Final   • Monocytes, Absolute 03/09/2021 0.50  0.10 - 0.90 10*3/mm3 Final   Office Visit on 02/24/2021   Component Date Value Ref Range Status   • Hemoglobin A1C 02/24/2021 5.7  % Final        No results found.    ASSESSMENT: The patient is a very pleasant 65 y.o. male  with polycythemia vera    PROBLEM LIST:   1.  Polycythemia vera:  A.  Presented with asymptomatic elevated hemoglobin hematocrit and platelets August 2020  B.  JAK2 V6 17F mutation  2.  Thrombocytosis  3.  Type 2 diabetes  4.  Hypertension  5.  Hypercholesterolemia    PLAN:  1.  I did go over the blood work results with the patient I reassured him his hematocrit is under target.  His platelets are stable.  2.  I will change phlebotomy to once every 4 weeks.  3.  I will continue aspirin 81 mg daily.  4.  Future treatment options include Hydrea.  5.  The patient will follow-up with me in 4 months with repeated labs.  6.  I will have very low threshold to add Hydrea if  needed given his age and multiple comorbidities.  The patient never had a blood clot in the past.  7. We will continue Norvasc Coreg and Cozaar for hypertension.  8.  We will continue insulin and metformin for type 2 diabetes per  9.  We will continue Crestor 10 mg daily for hypercholesterolemia.    Annabelle Lea MD  3/9/2021

## 2021-03-23 ENCOUNTER — HOSPITAL ENCOUNTER (OUTPATIENT)
Dept: ONCOLOGY | Facility: HOSPITAL | Age: 66
Setting detail: INFUSION SERIES
Discharge: HOME | End: 2021-03-23

## 2021-03-23 VITALS
HEIGHT: 67 IN | TEMPERATURE: 98.5 F | WEIGHT: 192 LBS | SYSTOLIC BLOOD PRESSURE: 151 MMHG | RESPIRATION RATE: 20 BRPM | BODY MASS INDEX: 30.13 KG/M2 | DIASTOLIC BLOOD PRESSURE: 68 MMHG | HEART RATE: 95 BPM

## 2021-03-23 DIAGNOSIS — D75.1 POLYCYTHEMIA: Primary | ICD-10-CM

## 2021-03-23 LAB
ERYTHROCYTE [DISTWIDTH] IN BLOOD BY AUTOMATED COUNT: 15.7 % (ref 12.3–15.4)
HCT VFR BLD AUTO: 45.3 % (ref 37.5–51)
HGB BLD-MCNC: 14.3 G/DL (ref 13–17.7)
LYMPHOCYTES # BLD AUTO: 2.9 10*3/MM3 (ref 0.7–3.1)
LYMPHOCYTES NFR BLD AUTO: 39.5 % (ref 19.6–45.3)
MCH RBC QN AUTO: 26 PG (ref 26.6–33)
MCHC RBC AUTO-ENTMCNC: 31.5 G/DL (ref 31.5–35.7)
MCV RBC AUTO: 82.5 FL (ref 79–97)
MONOCYTES # BLD AUTO: 0.3 10*3/MM3 (ref 0.1–0.9)
MONOCYTES NFR BLD AUTO: 4.1 % (ref 5–12)
NEUTROPHILS NFR BLD AUTO: 4.2 10*3/MM3 (ref 1.7–7)
NEUTROPHILS NFR BLD AUTO: 56.4 % (ref 42.7–76)
PLATELET # BLD AUTO: 515 10*3/MM3 (ref 140–450)
PMV BLD AUTO: 7.2 FL (ref 6–12)
RBC # BLD AUTO: 5.49 10*6/MM3 (ref 4.14–5.8)
WBC # BLD AUTO: 7.4 10*3/MM3 (ref 3.4–10.8)

## 2021-03-23 PROCEDURE — 85025 COMPLETE CBC W/AUTO DIFF WBC: CPT | Performed by: INTERNAL MEDICINE

## 2021-03-23 PROCEDURE — 99195 PHLEBOTOMY: CPT

## 2021-03-23 PROCEDURE — 36415 COLL VENOUS BLD VENIPUNCTURE: CPT

## 2021-03-24 ENCOUNTER — OFFICE VISIT (OUTPATIENT)
Dept: FAMILY MEDICINE CLINIC | Facility: CLINIC | Age: 66
End: 2021-03-24

## 2021-03-24 VITALS
OXYGEN SATURATION: 99 % | HEART RATE: 92 BPM | BODY MASS INDEX: 30.13 KG/M2 | HEIGHT: 67 IN | DIASTOLIC BLOOD PRESSURE: 82 MMHG | SYSTOLIC BLOOD PRESSURE: 150 MMHG | WEIGHT: 192 LBS

## 2021-03-24 DIAGNOSIS — E11.65 TYPE 2 DIABETES MELLITUS WITH HYPERGLYCEMIA, WITH LONG-TERM CURRENT USE OF INSULIN (HCC): ICD-10-CM

## 2021-03-24 DIAGNOSIS — E78.5 HYPERLIPIDEMIA LDL GOAL <70: ICD-10-CM

## 2021-03-24 DIAGNOSIS — I25.119 CORONARY ARTERY DISEASE INVOLVING NATIVE CORONARY ARTERY OF NATIVE HEART WITH ANGINA PECTORIS (HCC): ICD-10-CM

## 2021-03-24 DIAGNOSIS — I10 ESSENTIAL HYPERTENSION: Primary | ICD-10-CM

## 2021-03-24 DIAGNOSIS — D75.1 POLYCYTHEMIA: ICD-10-CM

## 2021-03-24 DIAGNOSIS — Z79.4 TYPE 2 DIABETES MELLITUS WITH HYPERGLYCEMIA, WITH LONG-TERM CURRENT USE OF INSULIN (HCC): ICD-10-CM

## 2021-03-24 PROCEDURE — 99214 OFFICE O/P EST MOD 30 MIN: CPT | Performed by: INTERNAL MEDICINE

## 2021-03-24 NOTE — PROGRESS NOTES
Chief Complaint   Patient presents with   • Hypertension     150/80's at home        HPI:  Gal Pierce is a 65 y.o. male who presents today for follow-up blood pressure check.  Mostly 140s systolic over 70s diastolic at home.  No acute concerns and overall feels well.  Taking medication as prescribed.    ROS:  Constitutional: no fevers, night sweats or unexplained weight loss  Eyes: no vision changes  ENT: no runny nose, ear pain, sore throat  Cardio: no chest pain, palpitations  Pulm: no shortness of breath, wheezing, or cough  GI: no abdominal pain or changes in bowel movements  : no difficulty urinating  MSK: no difficulty ambulating, no joint pain  Neuro: no weakness, dizziness or headache  Psych: no trouble sleeping  Endo: no change in appetite      Past Medical History:   Diagnosis Date   • CAD (coronary artery disease)    • Diabetes mellitus (CMS/HCC)    • Heart attack (CMS/HCC)    • Hyperlipidemia    • Hypertension       Family History   Problem Relation Age of Onset   • Diabetes Mother    • Aneurysm Mother    • Hypertension Father    • Diabetes Father    • Heart attack Father    • Diabetes Sister    • No Known Problems Brother    • Diabetes Maternal Grandmother    • Hypertension Maternal Grandmother    • Diabetes Maternal Grandfather    • Hypertension Maternal Grandfather    • Diabetes Paternal Grandmother    • Hypertension Paternal Grandmother    • Diabetes Paternal Grandfather    • Hypertension Paternal Grandfather    • Diabetes Sister    • Diabetes Sister    • Cancer Sister    • Hypertension Brother    • Heart disease Brother    • Diabetes Brother    • Hypertension Brother       Social History     Socioeconomic History   • Marital status:      Spouse name: Not on file   • Number of children: Not on file   • Years of education: Not on file   • Highest education level: Not on file   Tobacco Use   • Smoking status: Former Smoker     Packs/day: 0.25     Years: 15.00     Pack years: 3.75   •  "Smokeless tobacco: Never Used   • Tobacco comment: 40 years ago   Substance and Sexual Activity   • Alcohol use: No   • Drug use: No   • Sexual activity: Defer      Allergies   Allergen Reactions   • Crestor [Rosuvastatin Calcium] Myalgia   • Hydrochlorothiazide Other (See Comments)     Pt states it makes his blood pressure \"real low\"   • Lipitor [Atorvastatin Calcium] Myalgia   • Penicillins Rash      Immunization History   Administered Date(s) Administered   • FLUAD TRI 65YR+ 02/14/2013   • Flu Vaccine Quad PF >36MO 10/04/2019   • Flulaval/Fluarix/Fluzone Quad 11/26/2014, 10/04/2019, 10/23/2020   • Pneumococcal Conjugate 13-Valent (PCV13) 08/03/2015   • Pneumococcal Polysaccharide (PPSV23) 07/14/2008   • Tdap 02/13/2009        PE:  Vitals:    03/24/21 0958   BP: 150/82   Pulse: 92   SpO2: 99%      Body mass index is 30.07 kg/m².    Gen Appearance: NAD  HEENT: Normocephalic, PERRLA, no thyromegaly, trache midline  Heart: RRR, normal S1 and S2, no murmur  Lungs: CTA b/l, no wheezing, no crackles  Abdomen: Soft, non-tender, non-distended, no guarding and BSx4  MSK: Moves all extremities well, normal gait, no peripheral edema  Pulses: Palpable and equal b/l  Lymph nodes: No palpable lymphadenopathy   Neuro: No focal deficits      Current Outpatient Medications   Medication Sig Dispense Refill   • amLODIPine (NORVASC) 10 MG tablet Take 1 tablet by mouth Daily. 90 tablet 3   • aspirin 81 MG EC tablet Take 1 tablet by mouth Daily. 90 tablet 3   • carvedilol (Coreg) 25 MG tablet Take 1 tablet by mouth 2 (Two) Times a Day With Meals. 180 tablet 1   • clopidogrel (PLAVIX) 75 MG tablet      • Insulin Glargine (BASAGLAR KWIKPEN) 100 UNIT/ML injection pen Inject 100 Units under the skin into the appropriate area as directed Daily. 10 pen 11   • losartan (Cozaar) 100 MG tablet Take 1 tablet by mouth Daily. 90 tablet 1   • metFORMIN (GLUCOPHAGE) 1000 MG tablet TAKE ONE TABLET BY MOUTH TWICE A DAY WITH MEALS 90 tablet 0   • " Multiple Vitamins-Minerals (CENTRUM SILVER PO) Take  by mouth Daily.     • rosuvastatin (CRESTOR) 10 MG tablet Take 1 tablet by mouth Every Night. 90 tablet 0   • sildenafil (Viagra) 100 MG tablet Take 1 tablet by mouth Daily As Needed for Erectile Dysfunction. 10 tablet 3     No current facility-administered medications for this visit.      Continue chronic medications for now.  His goal blood pressure would be less than 130/80, elevated systolic here today although he does have relatively well-controlled blood pressures at home.  Will need to bring his blood pressure cuff in for titration.  He has upcoming visit with cardiology, may need to start back on HCTZ for blood pressure continues to be high.  Fasting sugars well controlled.  Continue current insulin regimen.    Diagnoses and all orders for this visit:    1. Essential hypertension (Primary)    2. Coronary artery disease involving native coronary artery of native heart with angina pectoris (CMS/HCC)    3. Hyperlipidemia LDL goal <70    4. Type 2 diabetes mellitus with hyperglycemia, with long-term current use of insulin (CMS/[facility])    5. Polycythemia         Return in about 3 months (around 6/24/2021) for htn, Annual.     Please note that portions of this document were completed with a voice recognition program. Efforts were made to edit the dictations, but occasionally words are mis-transcribed.

## 2021-04-06 ENCOUNTER — TELEPHONE (OUTPATIENT)
Dept: FAMILY MEDICINE CLINIC | Facility: CLINIC | Age: 66
End: 2021-04-06

## 2021-04-06 RX ORDER — NEEDLES, DISPOSABLE 25GX5/8"
1 NEEDLE, DISPOSABLE MISCELLANEOUS DAILY
Qty: 100 EACH | Refills: 11 | Status: CANCELLED | OUTPATIENT
Start: 2021-04-06

## 2021-04-06 NOTE — TELEPHONE ENCOUNTER
Caller: Gal Pierce    Relationship: Self    Best call back number:[phone]      What medication are you requesting: INSULIN PEN NEEDLES FOR SARAH FOLEY 5/16 LENGTH GAUGE 31G    What are your current symptoms: DIABETES    How long have you been experiencing symptoms:     Have you had these symptoms before:    [] Yes  [] No    Have you been treated for these symptoms before:   [] Yes  [] No    If a prescription is needed, what is your preferred pharmacy and phone number:  [facility] [address]    Additional notes:

## 2021-04-07 RX ORDER — PEN NEEDLE, DIABETIC 31 GX5/16"
1 NEEDLE, DISPOSABLE MISCELLANEOUS DAILY
Qty: 30 EACH | Refills: 5 | Status: SHIPPED | OUTPATIENT
Start: 2021-04-07 | End: 2021-11-29 | Stop reason: SDUPTHER

## 2021-04-17 DIAGNOSIS — E11.65 TYPE 2 DIABETES MELLITUS WITH HYPERGLYCEMIA, WITH LONG-TERM CURRENT USE OF INSULIN (HCC): ICD-10-CM

## 2021-04-17 DIAGNOSIS — Z79.4 TYPE 2 DIABETES MELLITUS WITH HYPERGLYCEMIA, WITH LONG-TERM CURRENT USE OF INSULIN (HCC): ICD-10-CM

## 2021-04-20 ENCOUNTER — HOSPITAL ENCOUNTER (OUTPATIENT)
Dept: ONCOLOGY | Facility: HOSPITAL | Age: 66
Setting detail: INFUSION SERIES
Discharge: HOME | End: 2021-04-20

## 2021-04-20 VITALS
TEMPERATURE: 97.8 F | DIASTOLIC BLOOD PRESSURE: 78 MMHG | WEIGHT: 187 LBS | HEART RATE: 94 BPM | RESPIRATION RATE: 20 BRPM | HEIGHT: 67 IN | SYSTOLIC BLOOD PRESSURE: 168 MMHG | BODY MASS INDEX: 29.35 KG/M2

## 2021-04-20 DIAGNOSIS — D75.1 POLYCYTHEMIA: Primary | ICD-10-CM

## 2021-04-20 LAB
HCT VFR BLD AUTO: 42.1 % (ref 37.5–51)
HGB BLD-MCNC: 13.6 G/DL (ref 13–17.7)

## 2021-04-20 PROCEDURE — 36415 COLL VENOUS BLD VENIPUNCTURE: CPT

## 2021-04-20 PROCEDURE — 85018 HEMOGLOBIN: CPT | Performed by: INTERNAL MEDICINE

## 2021-04-20 PROCEDURE — 85014 HEMATOCRIT: CPT | Performed by: INTERNAL MEDICINE

## 2021-04-20 NOTE — TELEPHONE ENCOUNTER
Caller: Gal Pierce Edward    Relationship: Self    Best call back number:     Medication needed:   Requested Prescriptions     Pending Prescriptions Disp Refills   • metFORMIN (GLUCOPHAGE) 1000 MG tablet [Pharmacy Med Name: metFORMIN HCL 1,000 MG TABLET] 90 tablet 0     Sig: TAKE ONE TABLET BY MOUTH TWICE A DAY WITH MEALS       When do you need the refill by: ASAP      Does the patient have less than a 3 day supply:  [x] Yes  [] No    What is the patient's preferred pharmacy: [facility] [address] [phone] [facility] [phone]

## 2021-04-23 DIAGNOSIS — Z79.4 TYPE 2 DIABETES MELLITUS WITH HYPERGLYCEMIA, WITH LONG-TERM CURRENT USE OF INSULIN (HCC): ICD-10-CM

## 2021-04-23 DIAGNOSIS — E11.65 TYPE 2 DIABETES MELLITUS WITH HYPERGLYCEMIA, WITH LONG-TERM CURRENT USE OF INSULIN (HCC): ICD-10-CM

## 2021-04-23 NOTE — TELEPHONE ENCOUNTER
Caller: KariGal Edward    Relationship: Self    Best call back number: [phone]    Medication needed:   Requested Prescriptions     Pending Prescriptions Disp Refills   • Insulin Glargine (BASAGLAR KWIKPEN) 100 UNIT/ML injection pen 10 pen 11     Sig: Inject 100 Units under the skin into the appropriate area as directed Daily.       When do you need the refill by: ASAP    What additional details did the patient provide when requesting the medication: PATIENT HAS 2 DAYS LEFT     Does the patient have less than a 3 day supply:  [x] Yes  [] No    [facility] the [facility] [address] [place] [address] [phone] [facility] [phone]

## 2021-04-26 RX ORDER — INSULIN GLARGINE 100 [IU]/ML
100 INJECTION, SOLUTION SUBCUTANEOUS DAILY
Qty: 10 PEN | Refills: 11 | Status: SHIPPED | OUTPATIENT
Start: 2021-04-26 | End: 2021-11-29 | Stop reason: SDUPTHER

## 2021-04-26 NOTE — TELEPHONE ENCOUNTER
Contacted patient to let him know his prescription was sent to the pharmacy on file. Pt verbalized understanding and did not have any additional questions at this time.

## 2021-05-18 ENCOUNTER — HOSPITAL ENCOUNTER (OUTPATIENT)
Dept: ONCOLOGY | Facility: HOSPITAL | Age: 66
Setting detail: INFUSION SERIES
Discharge: HOME | End: 2021-05-18

## 2021-05-18 VITALS
BODY MASS INDEX: 29.03 KG/M2 | RESPIRATION RATE: 16 BRPM | DIASTOLIC BLOOD PRESSURE: 84 MMHG | TEMPERATURE: 98 F | SYSTOLIC BLOOD PRESSURE: 179 MMHG | HEART RATE: 91 BPM | HEIGHT: 67 IN | WEIGHT: 185 LBS

## 2021-05-18 DIAGNOSIS — D75.1 POLYCYTHEMIA: ICD-10-CM

## 2021-05-18 LAB
HCT VFR BLD AUTO: 42.4 % (ref 37.5–51)
HGB BLD-MCNC: 13.4 G/DL (ref 13–17.7)

## 2021-05-18 PROCEDURE — 85014 HEMATOCRIT: CPT | Performed by: INTERNAL MEDICINE

## 2021-05-18 PROCEDURE — 85018 HEMOGLOBIN: CPT | Performed by: INTERNAL MEDICINE

## 2021-05-18 PROCEDURE — 36415 COLL VENOUS BLD VENIPUNCTURE: CPT

## 2021-06-15 ENCOUNTER — HOSPITAL ENCOUNTER (OUTPATIENT)
Dept: ONCOLOGY | Facility: HOSPITAL | Age: 66
Setting detail: INFUSION SERIES
Discharge: HOME | End: 2021-06-15

## 2021-06-15 VITALS
RESPIRATION RATE: 20 BRPM | WEIGHT: 187 LBS | SYSTOLIC BLOOD PRESSURE: 161 MMHG | DIASTOLIC BLOOD PRESSURE: 72 MMHG | BODY MASS INDEX: 29.35 KG/M2 | TEMPERATURE: 98.2 F | HEIGHT: 67 IN | HEART RATE: 89 BPM

## 2021-06-15 DIAGNOSIS — D75.1 POLYCYTHEMIA: ICD-10-CM

## 2021-06-15 LAB
HCT VFR BLD AUTO: 40.6 % (ref 37.5–51)
HGB BLD-MCNC: 12.7 G/DL (ref 13–17.7)

## 2021-06-15 PROCEDURE — 36415 COLL VENOUS BLD VENIPUNCTURE: CPT

## 2021-06-15 PROCEDURE — 85018 HEMOGLOBIN: CPT | Performed by: INTERNAL MEDICINE

## 2021-06-15 PROCEDURE — 85014 HEMATOCRIT: CPT | Performed by: INTERNAL MEDICINE

## 2021-07-01 DIAGNOSIS — D75.1 POLYCYTHEMIA: Primary | ICD-10-CM

## 2021-07-10 NOTE — PROGRESS NOTES
"[facility]      Identification: Gal Pierce is a 65 y.o. male who resides in [place], [place].    Reason for visit:  Coronary Artery Disease      Subjective      Gal Pierce presents to [facility] for followup.    Gal returns to the office today in follow-up of his coronary artery disease and risk factors.  He has been doing well and denies any exertional chest pressure to suggest angina.  He has been riding his bike and walking this summer.  He is planning on taking his grandkids to [place] in the next couple weeks.  He is taking rosuvastatin 2 times weekly but cannot take any more without developing myalgias.  He does not want to take any injectable medications for his cholesterol.            Review of Systems   Cardiovascular: Negative.    Respiratory: Negative.        Objective     /78 (BP Location: Left arm, Patient Position: Sitting, Cuff Size: Adult)   Pulse 87   Ht 170.2 cm (67\")   Wt 84.6 kg (186 lb 6.4 oz)   SpO2 97%   BMI 29.19 kg/m²       Constitutional:       Appearance: Healthy appearance. Well-developed.   Eyes:      General: No scleral icterus.  HENT:      Head: Normocephalic and atraumatic.   Neck:      Vascular: No carotid bruit or JVD. JVD normal.   Pulmonary:      Effort: Pulmonary effort is normal.      Breath sounds: Normal breath sounds.   Cardiovascular:      Normal rate. Regular rhythm.      Murmurs: There is no murmur.      No gallop.   Musculoskeletal:      Extremities: No clubbing present.Skin:     General: Skin is warm and dry. There is no cyanosis.   Neurological:      Mental Status: Alert.   Psychiatric:         Attention and Perception: Attention normal.         Behavior: Behavior normal.         Result Review :    Lab Results   Component Value Date    GLUCOSE 123 (H) 08/10/2020    BUN 15 08/10/2020    CREATININE 0.86 08/10/2020    EGFRIFAFRI 109 08/10/2020    BCR 17.4 08/10/2020    K 4.1 08/10/2020    CO2 25.4 08/10/2020 "    CALCIUM 10.0 08/10/2020    ALBUMIN 4.30 08/10/2020    AST 30 08/10/2020    ALT 38 08/10/2020     Lab Results   Component Value Date    WBC 7.40 03/23/2021    HGB 12.7 (L) 06/15/2021    HCT 40.6 06/15/2021    MCV 82.5 03/23/2021     (H) 03/23/2021     Lab Results   Component Value Date    CHOL 175 08/10/2020    TRIG 55 08/10/2020    HDL 38 (L) 08/10/2020     (H) 08/10/2020     Lab Results   Component Value Date    HGBA1C 5.7 02/24/2021             Assessment     Problem List Items Addressed This Visit        Cardiology Problems    Coronary artery disease involving native coronary artery of native heart with angina pectoris (CMS/[facility]) - Primary (Chronic)    Overview     · NSTEMI in 2002 with drug-eluting stent to the LAD  · NSTEMI 2004 with drug-eluting stent to the left circumflex.  · Echocardiogram (10/26/2018): LVEF normal. Mild LVH. Mild MR.   · Cardiac catheterization for NSTEMI  (10/26/2018): severe 3-vessel CAD.  LVEF normal.  · CABG (10/29/2018): LIMA to LAD, SVG to RCA, sequential SVG to OM and LPL         Essential hypertension (Chronic)    Overview     • Target blood pressure <130/80 mmHg         Hyperlipidemia LDL goal <70 (Chronic)    Overview     · High intensity statin therapy indicated due to CAD  · Historically intolerant to rosuvastatin and atorvastatin            Other    Type 2 diabetes mellitus with hyperglycemia, with long-term current use of insulin (CMS/[facility])          Plan   • Okay to stop clopidogrel  • Start ezetimibe 10 mg daily  • Otherwise continue present medical therapy      Follow-up   Return in about 1 year (around 7/13/2022).        Roberto Faust MD, FAC, [facility]  7/13/2021

## 2021-07-13 ENCOUNTER — OFFICE VISIT (OUTPATIENT)
Dept: ONCOLOGY | Facility: CLINIC | Age: 66
End: 2021-07-13

## 2021-07-13 ENCOUNTER — HOSPITAL ENCOUNTER (OUTPATIENT)
Dept: ONCOLOGY | Facility: HOSPITAL | Age: 66
Setting detail: INFUSION SERIES
Discharge: HOME | End: 2021-07-13

## 2021-07-13 ENCOUNTER — OFFICE VISIT (OUTPATIENT)
Dept: CARDIOLOGY | Facility: CLINIC | Age: 66
End: 2021-07-13

## 2021-07-13 ENCOUNTER — LAB (OUTPATIENT)
Dept: LAB | Facility: HOSPITAL | Age: 66
End: 2021-07-13

## 2021-07-13 VITALS
SYSTOLIC BLOOD PRESSURE: 156 MMHG | TEMPERATURE: 97.1 F | OXYGEN SATURATION: 97 % | DIASTOLIC BLOOD PRESSURE: 75 MMHG | HEART RATE: 88 BPM | BODY MASS INDEX: 29.19 KG/M2 | WEIGHT: 186 LBS | HEIGHT: 67 IN | RESPIRATION RATE: 18 BRPM

## 2021-07-13 VITALS
HEIGHT: 67 IN | WEIGHT: 186.4 LBS | OXYGEN SATURATION: 97 % | SYSTOLIC BLOOD PRESSURE: 136 MMHG | HEART RATE: 87 BPM | BODY MASS INDEX: 29.26 KG/M2 | DIASTOLIC BLOOD PRESSURE: 78 MMHG

## 2021-07-13 DIAGNOSIS — Z79.4 TYPE 2 DIABETES MELLITUS WITH HYPERGLYCEMIA, WITH LONG-TERM CURRENT USE OF INSULIN (HCC): ICD-10-CM

## 2021-07-13 DIAGNOSIS — D75.1 POLYCYTHEMIA: Primary | ICD-10-CM

## 2021-07-13 DIAGNOSIS — I10 ESSENTIAL HYPERTENSION: ICD-10-CM

## 2021-07-13 DIAGNOSIS — E78.5 HYPERLIPIDEMIA LDL GOAL <70: ICD-10-CM

## 2021-07-13 DIAGNOSIS — I25.119 CORONARY ARTERY DISEASE INVOLVING NATIVE CORONARY ARTERY OF NATIVE HEART WITH ANGINA PECTORIS (HCC): Primary | Chronic | ICD-10-CM

## 2021-07-13 DIAGNOSIS — E11.65 TYPE 2 DIABETES MELLITUS WITH HYPERGLYCEMIA, WITH LONG-TERM CURRENT USE OF INSULIN (HCC): ICD-10-CM

## 2021-07-13 DIAGNOSIS — D75.1 POLYCYTHEMIA: ICD-10-CM

## 2021-07-13 LAB
ERYTHROCYTE [DISTWIDTH] IN BLOOD BY AUTOMATED COUNT: 24.2 % (ref 12.3–15.4)
HCT VFR BLD AUTO: 45.2 % (ref 37.5–51)
HGB BLD-MCNC: 14.2 G/DL (ref 13–17.7)
LYMPHOCYTES # BLD AUTO: 2.9 10*3/MM3 (ref 0.7–3.1)
LYMPHOCYTES NFR BLD AUTO: 37.1 % (ref 19.6–45.3)
MCH RBC QN AUTO: 23.9 PG (ref 26.6–33)
MCHC RBC AUTO-ENTMCNC: 31.5 G/DL (ref 31.5–35.7)
MCV RBC AUTO: 75.8 FL (ref 79–97)
MONOCYTES # BLD AUTO: 0.3 10*3/MM3 (ref 0.1–0.9)
MONOCYTES NFR BLD AUTO: 3.6 % (ref 5–12)
NEUTROPHILS NFR BLD AUTO: 4.6 10*3/MM3 (ref 1.7–7)
NEUTROPHILS NFR BLD AUTO: 59.3 % (ref 42.7–76)
PLATELET # BLD AUTO: 574 10*3/MM3 (ref 140–450)
PMV BLD AUTO: 6.8 FL (ref 6–12)
RBC # BLD AUTO: 5.97 10*6/MM3 (ref 4.14–5.8)
WBC # BLD AUTO: 7.7 10*3/MM3 (ref 3.4–10.8)

## 2021-07-13 PROCEDURE — 36415 COLL VENOUS BLD VENIPUNCTURE: CPT

## 2021-07-13 PROCEDURE — 85025 COMPLETE CBC W/AUTO DIFF WBC: CPT

## 2021-07-13 PROCEDURE — 99195 PHLEBOTOMY: CPT

## 2021-07-13 PROCEDURE — 99214 OFFICE O/P EST MOD 30 MIN: CPT | Performed by: INTERNAL MEDICINE

## 2021-07-13 RX ORDER — CLOPIDOGREL BISULFATE 75 MG/1
75 TABLET ORAL DAILY
Qty: 90 TABLET | Refills: 3 | Status: SHIPPED | OUTPATIENT
Start: 2021-07-13 | End: 2022-11-14

## 2021-07-13 RX ORDER — CARVEDILOL 25 MG/1
25 TABLET ORAL 2 TIMES DAILY WITH MEALS
Qty: 180 TABLET | Refills: 3 | Status: SHIPPED | OUTPATIENT
Start: 2021-07-13 | End: 2022-07-19

## 2021-07-13 RX ORDER — EZETIMIBE 10 MG/1
10 TABLET ORAL DAILY
Qty: 90 TABLET | Refills: 3 | Status: SHIPPED | OUTPATIENT
Start: 2021-07-13

## 2021-07-13 RX ORDER — ROSUVASTATIN CALCIUM 10 MG/1
10 TABLET, COATED ORAL NIGHTLY
Qty: 90 TABLET | Refills: 1 | Status: SHIPPED | OUTPATIENT
Start: 2021-07-13

## 2021-07-13 RX ORDER — LOSARTAN POTASSIUM 100 MG/1
100 TABLET ORAL DAILY
Qty: 90 TABLET | Refills: 3 | Status: SHIPPED | OUTPATIENT
Start: 2021-07-13 | End: 2022-05-03

## 2021-07-13 NOTE — ASSESSMENT & PLAN NOTE
· No angina  · Continue low-dose aspirin, carvedilol, and statin therapy  · Suggested discontinuation of clopidogrel, the patient preferred to continue

## 2021-07-13 NOTE — ASSESSMENT & PLAN NOTE
· On maximally tolerated rosuvastatin  · Start ezetimibe 10 mg daily  · Discussed PCSK9 inhibitor therapy but patient declines

## 2021-07-13 NOTE — PROGRESS NOTES
DATE OF VISIT: 7/13/2021    REASON FOR VISIT: Followup for primary polycythemia vera     HISTORY OF PRESENT ILLNESS: The patient is a very pleasant 65 y.o. male  with past medical history significant for polycythemia vera diagnosed August 2020.  The patient was started on phlebotomy December 2020.  The patient is here today for scheduled follow-up visit.    SUBJECTIVE: The patient is here today by himself. he was able to tolerate  his treatment without any serious side effects.  He did not require phlebotomy since March 2021. he denied any fever or  chills, no night sweats, denied any headaches    PAST MEDICAL HISTORY/SOCIAL HISTORY/FAMILY HISTORY: Reviewed by me and unchanged from my documentation done on 07/13/21.    Review of Systems   Constitutional: Negative for activity change, appetite change, chills, fatigue, fever and unexpected weight change.   HENT: Negative for hearing loss, mouth sores, nosebleeds, sore throat and trouble swallowing.    Eyes: Negative for visual disturbance.   Respiratory: Negative for cough, chest tightness, shortness of breath and wheezing.    Cardiovascular: Negative for chest pain, palpitations and leg swelling.   Gastrointestinal: Negative for abdominal distention, abdominal pain, blood in stool, constipation, diarrhea, nausea, rectal pain and vomiting.   Endocrine: Negative for cold intolerance and heat intolerance.   Genitourinary: Negative for difficulty urinating, dysuria, frequency and urgency.   Musculoskeletal: Negative for arthralgias, back pain, gait problem, joint swelling and myalgias.   Skin: Negative for rash.   Neurological: Negative for dizziness, tremors, syncope, weakness, light-headedness, numbness and headaches.   Hematological: Negative for adenopathy. Does not bruise/bleed easily.   Psychiatric/Behavioral: Negative for confusion, sleep disturbance and suicidal ideas. The patient is not nervous/anxious.          Current Outpatient Medications:   •  amLODIPine  "(NORVASC) 10 MG tablet, Take 1 tablet by mouth Daily., Disp: 90 tablet, Rfl: 3  •  aspirin 81 MG EC tablet, Take 1 tablet by mouth Daily., Disp: 90 tablet, Rfl: 3  •  carvedilol (Coreg) 25 MG tablet, Take 1 tablet by mouth 2 (Two) Times a Day With Meals., Disp: 180 tablet, Rfl: 3  •  clopidogrel (PLAVIX) 75 MG tablet, Take 1 tablet by mouth Daily., Disp: 90 tablet, Rfl: 3  •  ezetimibe (ZETIA) 10 MG tablet, Take 1 tablet by mouth Daily., Disp: 90 tablet, Rfl: 3  •  Insulin Glargine (BASAGLAR KWIKPEN) 100 UNIT/ML injection pen, Inject 100 Units under the skin into the appropriate area as directed Daily., Disp: 10 pen, Rfl: 11  •  Insulin Pen Needle (Pen Needles 5/16\") 31G X 8 MM misc, 1 each Daily., Disp: 30 each, Rfl: 5  •  losartan (Cozaar) 100 MG tablet, Take 1 tablet by mouth Daily., Disp: 90 tablet, Rfl: 3  •  metFORMIN (GLUCOPHAGE) 1000 MG tablet, TAKE ONE TABLET BY MOUTH TWICE A DAY WITH MEALS, Disp: 90 tablet, Rfl: 3  •  Multiple Vitamins-Minerals (CENTRUM SILVER PO), Take  by mouth Daily., Disp: , Rfl:   •  rosuvastatin (CRESTOR) 10 MG tablet, Take 1 tablet by mouth Every Night., Disp: 90 tablet, Rfl: 1  •  sildenafil (Viagra) 100 MG tablet, Take 1 tablet by mouth Daily As Needed for Erectile Dysfunction., Disp: 10 tablet, Rfl: 3    PHYSICAL EXAMINATION:   /75   Pulse 88   Temp 97.1 °F (36.2 °C) (Temporal)   Resp 18   Ht 170.2 cm (67.01\")   Wt 84.4 kg (186 lb)   SpO2 97%   BMI 29.12 kg/m²    Pain Score    07/13/21 1307   PainSc: 0-No pain                     ECOG Performance Status: 1 - Symptomatic but completely ambulatory  General Appearance:  alert, cooperative, no apparent distress and appears stated age   Neurologic/Psychiatric: A&O x 3, gait steady, appropriate affect, strength 5/5 in all muscle groups   HEENT:  Normocephalic, without obvious abnormality, mucous membranes moist   Neck: Supple, symmetrical, trachea midline, no adenopathy;  No thyromegaly, masses, or tenderness   Lungs:   " Clear to auscultation bilaterally; respirations regular, even, and unlabored bilaterally   Heart:  Regular rate and rhythm, no murmurs appreciated   Abdomen:   Soft, non-tender, non-distended and no organomegaly   Lymph nodes: No cervical, supraclavicular, inguinal or axillary adenopathy noted   Extremities: Normal, atraumatic; no clubbing, cyanosis, or edema    Skin: No rashes, ulcers, or suspicious lesions noted     Lab on 07/13/2021   Component Date Value Ref Range Status   • WBC 07/13/2021 7.70  3.40 - 10.80 10*3/mm3 Final   • RBC 07/13/2021 5.97* 4.14 - 5.80 10*6/mm3 Final   • Hemoglobin 07/13/2021 14.2  13.0 - 17.7 g/dL Final   • Hematocrit 07/13/2021 45.2  37.5 - 51.0 % Final   • RDW 07/13/2021 24.2* 12.3 - 15.4 % Final   • MCV 07/13/2021 75.8* 79.0 - 97.0 fL Final   • MCH 07/13/2021 23.9* 26.6 - 33.0 pg Final   • MCHC 07/13/2021 31.5  31.5 - 35.7 g/dL Final   • MPV 07/13/2021 6.8  6.0 - 12.0 fL Final   • Platelets 07/13/2021 574* 140 - 450 10*3/mm3 Final   • Neutrophil % 07/13/2021 59.3  42.7 - 76.0 % Final   • Lymphocyte % 07/13/2021 37.1  19.6 - 45.3 % Final   • Monocyte % 07/13/2021 3.6* 5.0 - 12.0 % Final   • Neutrophils, Absolute 07/13/2021 4.60  1.70 - 7.00 10*3/mm3 Final   • Lymphocytes, Absolute 07/13/2021 2.90  0.70 - 3.10 10*3/mm3 Final   • Monocytes, Absolute 07/13/2021 0.30  0.10 - 0.90 10*3/mm3 Final        No results found.    ASSESSMENT: The patient is a very pleasant 65 y.o. male  with polycythemia vera    PROBLEM LIST:   1.  Polycythemia vera:  A.  Presented with asymptomatic elevated hemoglobin hematocrit and platelets August 2020  B.  JAK2 V6 17F mutation  2.  Thrombocytosis  3.  Type 2 diabetes  4.  Hypertension  5.  Hypercholesterolemia    PLAN:  1.  I did go over the blood work results with the patient I reassured him his hematocrit is under target.  His platelets are stable.  He will receive phlebotomy today since his mother is 45.2.  2.  I will change phlebotomy to once every 8  weeks.  3.  I will continue aspirin 81 mg daily.  4.  Future treatment options include Hydrea.  5.  The patient will follow-up with me in 6 months with repeated labs.  6.  I will have very low threshold to add Hydrea if needed given his age and multiple comorbidities.  The patient never had a blood clot in the past.  7. We will continue Norvasc Coreg and Cozaar for hypertension.  8.  We will continue insulin and metformin for type 2 diabetes per  9.  We will continue Crestor 10 mg daily for hypercholesterolemia.    Annabelle Lea MD  7/13/2021

## 2021-08-02 ENCOUNTER — OFFICE VISIT (OUTPATIENT)
Dept: FAMILY MEDICINE CLINIC | Facility: CLINIC | Age: 66
End: 2021-08-02

## 2021-08-02 VITALS
OXYGEN SATURATION: 98 % | DIASTOLIC BLOOD PRESSURE: 82 MMHG | SYSTOLIC BLOOD PRESSURE: 160 MMHG | HEART RATE: 85 BPM | BODY MASS INDEX: 29.12 KG/M2 | WEIGHT: 186 LBS

## 2021-08-02 DIAGNOSIS — Z12.5 ENCOUNTER FOR PROSTATE CANCER SCREENING: ICD-10-CM

## 2021-08-02 DIAGNOSIS — Z00.00 PREVENTATIVE HEALTH CARE: Primary | ICD-10-CM

## 2021-08-02 DIAGNOSIS — E11.65 TYPE 2 DIABETES MELLITUS WITH HYPERGLYCEMIA, WITH LONG-TERM CURRENT USE OF INSULIN (HCC): ICD-10-CM

## 2021-08-02 DIAGNOSIS — E55.9 VITAMIN D DEFICIENCY: ICD-10-CM

## 2021-08-02 DIAGNOSIS — Z79.4 TYPE 2 DIABETES MELLITUS WITH HYPERGLYCEMIA, WITH LONG-TERM CURRENT USE OF INSULIN (HCC): ICD-10-CM

## 2021-08-02 DIAGNOSIS — E78.5 HYPERLIPIDEMIA LDL GOAL <70: Chronic | ICD-10-CM

## 2021-08-02 DIAGNOSIS — I10 ESSENTIAL HYPERTENSION: Chronic | ICD-10-CM

## 2021-08-02 LAB — HBA1C MFR BLD: 5.6 %

## 2021-08-02 PROCEDURE — 3044F HG A1C LEVEL LT 7.0%: CPT | Performed by: INTERNAL MEDICINE

## 2021-08-02 PROCEDURE — 99396 PREV VISIT EST AGE 40-64: CPT | Performed by: INTERNAL MEDICINE

## 2021-08-02 PROCEDURE — 83036 HEMOGLOBIN GLYCOSYLATED A1C: CPT | Performed by: INTERNAL MEDICINE

## 2021-08-02 NOTE — PROGRESS NOTES
Chief Complaint   Patient presents with   • Hypertension     follow up   • Diabetes     A1C check       HPI:  Gal Pierce is a 66 y.o. male who presents today for annual physical.  No acute concerns today.  Has not taken any medication today.    ROS:  Constitutional: no fevers, night sweats or unexplained weight loss  Eyes: no vision changes  ENT: no runny nose, ear pain, sore throat  Cardio: no chest pain, palpitations  Pulm: no shortness of breath, wheezing, or cough  GI: no abdominal pain or changes in bowel movements  : no difficulty urinating  MSK: no difficulty ambulating, no joint pain  Neuro: no weakness, dizziness or headache  Psych: no trouble sleeping  Endo: no change in appetite      Past Medical History:   Diagnosis Date   • CAD (coronary artery disease)    • Diabetes mellitus (CMS/HCC)    • Heart attack (CMS/HCC)    • Hyperlipidemia    • Hypertension       Family History   Problem Relation Age of Onset   • Diabetes Mother    • Aneurysm Mother    • Hypertension Father    • Diabetes Father    • Heart attack Father    • Diabetes Sister    • No Known Problems Brother    • Diabetes Maternal Grandmother    • Hypertension Maternal Grandmother    • Diabetes Maternal Grandfather    • Hypertension Maternal Grandfather    • Diabetes Paternal Grandmother    • Hypertension Paternal Grandmother    • Diabetes Paternal Grandfather    • Hypertension Paternal Grandfather    • Diabetes Sister    • Diabetes Sister    • Cancer Sister    • Hypertension Brother    • Heart disease Brother    • Diabetes Brother    • Hypertension Brother       Social History     Socioeconomic History   • Marital status:      Spouse name: Not on file   • Number of children: Not on file   • Years of education: Not on file   • Highest education level: Not on file   Tobacco Use   • Smoking status: Former Smoker     Packs/day: 0.25     Years: 15.00     Pack years: 3.75   • Smokeless tobacco: Never Used   • Tobacco comment: 40 years  "ago   Vaping Use   • Vaping Use: Never used   Substance and Sexual Activity   • Alcohol use: No   • Drug use: No   • Sexual activity: Defer      Allergies   Allergen Reactions   • Hydrochlorothiazide Other (See Comments)     Pt states it makes his blood pressure \"real low\"   • Crestor [Rosuvastatin Calcium] Myalgia   • Lipitor [Atorvastatin Calcium] Myalgia   • Penicillins Rash      Immunization History   Administered Date(s) Administered   • FLUAD TRI 65YR+ 02/14/2013   • Flu Vaccine Quad PF >36MO 10/04/2019   • Flulaval/Fluarix/Fluzone Quad 11/26/2014, 10/04/2019, 10/23/2020   • Pneumococcal Conjugate 13-Valent (PCV13) 08/03/2015   • Pneumococcal Polysaccharide (PPSV23) 07/14/2008   • Tdap 02/13/2009        PE:  Vitals:    08/02/21 1003   BP: 160/82   Pulse: 85   SpO2: 98%      Body mass index is 29.12 kg/m².    Gen Appearance: NAD  HEENT: Normocephalic, PERRLA, no thyromegaly, trache midline  Heart: RRR, normal S1 and S2, no murmur  Lungs: CTA b/l, no wheezing, no crackles  Abdomen: Soft, non-tender, non-distended, no guarding and BSx4  MSK: Moves all extremities well, normal gait, no peripheral edema  Pulses: Palpable and equal b/l  Lymph nodes: No palpable lymphadenopathy   Neuro: No focal deficits      Current Outpatient Medications   Medication Sig Dispense Refill   • amLODIPine (NORVASC) 10 MG tablet Take 1 tablet by mouth Daily. 90 tablet 3   • aspirin 81 MG EC tablet Take 1 tablet by mouth Daily. 90 tablet 3   • carvedilol (Coreg) 25 MG tablet Take 1 tablet by mouth 2 (Two) Times a Day With Meals. 180 tablet 3   • clopidogrel (PLAVIX) 75 MG tablet Take 1 tablet by mouth Daily. 90 tablet 3   • ezetimibe (ZETIA) 10 MG tablet Take 1 tablet by mouth Daily. 90 tablet 3   • Insulin Glargine (BASAGLAR KWIKPEN) 100 UNIT/ML injection pen Inject 100 Units under the skin into the appropriate area as directed Daily. 10 pen 11   • Insulin Pen Needle (Pen Needles 5/16\") 31G X 8 MM misc 1 each Daily. 30 each 5   • " losartan (Cozaar) 100 MG tablet Take 1 tablet by mouth Daily. 90 tablet 3   • metFORMIN (GLUCOPHAGE) 1000 MG tablet TAKE ONE TABLET BY MOUTH TWICE A DAY WITH MEALS 90 tablet 3   • Multiple Vitamins-Minerals (CENTRUM SILVER PO) Take  by mouth Daily.     • rosuvastatin (CRESTOR) 10 MG tablet Take 1 tablet by mouth Every Night. 90 tablet 1   • sildenafil (Viagra) 100 MG tablet Take 1 tablet by mouth Daily As Needed for Erectile Dysfunction. 10 tablet 3     No current facility-administered medications for this visit.        Diagnoses and all orders for this visit:    1. Preventative health care (Primary)  Counseled on healthy weight, nutrition, physical activity, cancer screening, and immunizations.  Recent C-scope was a few years ago, he suspects it was at [facility]    2. Type 2 diabetes mellitus with hyperglycemia, with long-term current use of insulin (CMS/[facility])  -     POC Glycosylated Hemoglobin (Hb A1C)  A1c well controlled at 5.6.  No episodes of hypoglycemia.  Continue current dose insulin regimen.  3. Essential hypertension  Elevated today but he is not taking any medication.  Follow-up 4 weeks for reassessment on medication.  4. Hyperlipidemia LDL goal <70  Continue statin and Zetia, no myalgias       Return in about 4 weeks (around 8/30/2021) for htn.     Please note that portions of this document were completed with a voice recognition program. Efforts were made to edit the dictations, but occasionally words are mis-transcribed.

## 2021-08-09 RX ORDER — SILDENAFIL 100 MG/1
TABLET, FILM COATED ORAL
Qty: 10 TABLET | Refills: 0 | Status: SHIPPED | OUTPATIENT
Start: 2021-08-09

## 2021-08-10 ENCOUNTER — APPOINTMENT (OUTPATIENT)
Dept: ONCOLOGY | Facility: HOSPITAL | Age: 66
End: 2021-08-10

## 2021-08-10 ENCOUNTER — LAB (OUTPATIENT)
Dept: LAB | Facility: HOSPITAL | Age: 66
End: 2021-08-10

## 2021-08-10 DIAGNOSIS — Z00.00 PREVENTATIVE HEALTH CARE: ICD-10-CM

## 2021-08-10 DIAGNOSIS — I10 ESSENTIAL HYPERTENSION: ICD-10-CM

## 2021-08-10 DIAGNOSIS — Z79.4 TYPE 2 DIABETES MELLITUS WITH HYPERGLYCEMIA, WITH LONG-TERM CURRENT USE OF INSULIN (HCC): ICD-10-CM

## 2021-08-10 DIAGNOSIS — E11.65 TYPE 2 DIABETES MELLITUS WITH HYPERGLYCEMIA, WITH LONG-TERM CURRENT USE OF INSULIN (HCC): ICD-10-CM

## 2021-08-10 DIAGNOSIS — Z12.5 ENCOUNTER FOR PROSTATE CANCER SCREENING: ICD-10-CM

## 2021-08-10 DIAGNOSIS — E55.9 VITAMIN D DEFICIENCY: ICD-10-CM

## 2021-08-10 LAB
25(OH)D3 SERPL-MCNC: 24.9 NG/ML
ALBUMIN SERPL-MCNC: 4.4 G/DL (ref 3.5–5.2)
ALBUMIN/GLOB SERPL: 1.7 G/DL
ALP SERPL-CCNC: 63 U/L (ref 39–117)
ALT SERPL W P-5'-P-CCNC: 37 U/L (ref 1–41)
AMORPH URATE CRY URNS QL MICRO: ABNORMAL /HPF
ANION GAP SERPL CALCULATED.3IONS-SCNC: 13.2 MMOL/L (ref 5–15)
AST SERPL-CCNC: 34 U/L (ref 1–40)
BACTERIA UR QL AUTO: ABNORMAL /HPF
BASOPHILS # BLD AUTO: 0.09 10*3/MM3 (ref 0–0.2)
BASOPHILS NFR BLD AUTO: 1.4 % (ref 0–1.5)
BILIRUB SERPL-MCNC: 0.2 MG/DL (ref 0–1.2)
BILIRUB UR QL STRIP: NEGATIVE
BUN SERPL-MCNC: 13 MG/DL (ref 8–23)
BUN/CREAT SERPL: 12.9 (ref 7–25)
CALCIUM SPEC-SCNC: 9.5 MG/DL (ref 8.6–10.5)
CHLORIDE SERPL-SCNC: 106 MMOL/L (ref 98–107)
CHOLEST SERPL-MCNC: 170 MG/DL (ref 0–200)
CLARITY UR: CLEAR
CO2 SERPL-SCNC: 22.8 MMOL/L (ref 22–29)
COLOR UR: YELLOW
CREAT SERPL-MCNC: 1.01 MG/DL (ref 0.76–1.27)
DEPRECATED RDW RBC AUTO: 57.2 FL (ref 37–54)
EOSINOPHIL # BLD AUTO: 0.34 10*3/MM3 (ref 0–0.4)
EOSINOPHIL NFR BLD AUTO: 5.3 % (ref 0.3–6.2)
ERYTHROCYTE [DISTWIDTH] IN BLOOD BY AUTOMATED COUNT: 21.1 % (ref 12.3–15.4)
GFR SERPL CREATININE-BSD FRML MDRD: 90 ML/MIN/1.73
GLOBULIN UR ELPH-MCNC: 2.6 GM/DL
GLUCOSE SERPL-MCNC: 97 MG/DL (ref 65–99)
GLUCOSE UR STRIP-MCNC: NEGATIVE MG/DL
HCT VFR BLD AUTO: 47.7 % (ref 37.5–51)
HDLC SERPL-MCNC: 41 MG/DL (ref 40–60)
HGB BLD-MCNC: 15.1 G/DL (ref 13–17.7)
HGB UR QL STRIP.AUTO: NEGATIVE
HYALINE CASTS UR QL AUTO: ABNORMAL /LPF
IMM GRANULOCYTES # BLD AUTO: 0.02 10*3/MM3 (ref 0–0.05)
IMM GRANULOCYTES NFR BLD AUTO: 0.3 % (ref 0–0.5)
KETONES UR QL STRIP: ABNORMAL
LDLC SERPL CALC-MCNC: 103 MG/DL (ref 0–100)
LDLC/HDLC SERPL: 2.43 {RATIO}
LEUKOCYTE ESTERASE UR QL STRIP.AUTO: ABNORMAL
LYMPHOCYTES # BLD AUTO: 1.89 10*3/MM3 (ref 0.7–3.1)
LYMPHOCYTES NFR BLD AUTO: 29.6 % (ref 19.6–45.3)
MCH RBC QN AUTO: 24.6 PG (ref 26.6–33)
MCHC RBC AUTO-ENTMCNC: 31.7 G/DL (ref 31.5–35.7)
MCV RBC AUTO: 77.8 FL (ref 79–97)
MONOCYTES # BLD AUTO: 0.65 10*3/MM3 (ref 0.1–0.9)
MONOCYTES NFR BLD AUTO: 10.2 % (ref 5–12)
NEUTROPHILS NFR BLD AUTO: 3.39 10*3/MM3 (ref 1.7–7)
NEUTROPHILS NFR BLD AUTO: 53.2 % (ref 42.7–76)
NITRITE UR QL STRIP: NEGATIVE
NRBC BLD AUTO-RTO: 0 /100 WBC (ref 0–0.2)
PH UR STRIP.AUTO: 7 [PH] (ref 5–8)
PLATELET # BLD AUTO: 543 10*3/MM3 (ref 140–450)
PMV BLD AUTO: 9.2 FL (ref 6–12)
POTASSIUM SERPL-SCNC: 4 MMOL/L (ref 3.5–5.2)
PROT SERPL-MCNC: 7 G/DL (ref 6–8.5)
PROT UR QL STRIP: ABNORMAL
PSA SERPL-MCNC: 3.94 NG/ML (ref 0–4)
RBC # BLD AUTO: 6.13 10*6/MM3 (ref 4.14–5.8)
RBC # UR: ABNORMAL /HPF
REF LAB TEST METHOD: ABNORMAL
SODIUM SERPL-SCNC: 142 MMOL/L (ref 136–145)
SP GR UR STRIP: 1.02 (ref 1–1.03)
SQUAMOUS #/AREA URNS HPF: ABNORMAL /HPF
T4 FREE SERPL-MCNC: 1.15 NG/DL (ref 0.93–1.7)
TRANS CELLS #/AREA URNS HPF: ABNORMAL /HPF
TRIGL SERPL-MCNC: 147 MG/DL (ref 0–150)
TSH SERPL DL<=0.05 MIU/L-ACNC: 0.38 UIU/ML (ref 0.27–4.2)
UROBILINOGEN UR QL STRIP: ABNORMAL
VLDLC SERPL-MCNC: 26 MG/DL (ref 5–40)
WBC # BLD AUTO: 6.38 10*3/MM3 (ref 3.4–10.8)
WBC UR QL AUTO: ABNORMAL /HPF

## 2021-08-10 PROCEDURE — 84439 ASSAY OF FREE THYROXINE: CPT

## 2021-08-10 PROCEDURE — 80053 COMPREHEN METABOLIC PANEL: CPT

## 2021-08-10 PROCEDURE — 85025 COMPLETE CBC W/AUTO DIFF WBC: CPT

## 2021-08-10 PROCEDURE — 81001 URINALYSIS AUTO W/SCOPE: CPT

## 2021-08-10 PROCEDURE — 80061 LIPID PANEL: CPT

## 2021-08-10 PROCEDURE — 82306 VITAMIN D 25 HYDROXY: CPT

## 2021-08-10 PROCEDURE — 84443 ASSAY THYROID STIM HORMONE: CPT

## 2021-08-10 PROCEDURE — G0103 PSA SCREENING: HCPCS

## 2021-08-27 DIAGNOSIS — I10 ESSENTIAL HYPERTENSION: ICD-10-CM

## 2021-08-27 NOTE — TELEPHONE ENCOUNTER
Caller: KariGal Edward    Relationship: Self    Best call back number: [phone]     Medication needed:   Requested Prescriptions     Pending Prescriptions Disp Refills   • amLODIPine (NORVASC) 10 MG tablet 90 tablet 3     Sig: Take 1 tablet by mouth Daily.       When do you need the refill by: SOON    What additional details did the patient provide when requesting the medication: 4 DAYS LEFT - THE PATIENT REPORTS HE USED TO GET THIS FROM HIS CARDIOLOGIST BUT IS REQUESTING DR VALENCIA TO FILL IT.     Does the patient have less than a 3 day supply:  [] Yes  [x] No    What is the patient's preferred pharmacy: [address] [place] [address] [phone] [facility] [phone]

## 2021-08-28 RX ORDER — AMLODIPINE BESYLATE 10 MG/1
10 TABLET ORAL DAILY
Qty: 30 TABLET | Refills: 0 | Status: SHIPPED | OUTPATIENT
Start: 2021-08-28 | End: 2021-10-06

## 2021-08-28 NOTE — TELEPHONE ENCOUNTER
Rx Refill Note  Requested Prescriptions     Pending Prescriptions Disp Refills   • amLODIPine (NORVASC) 10 MG tablet 90 tablet 3     Sig: Take 1 tablet by mouth Daily.      Last office visit with prescribing clinician: 8/2/2021      Next office visit with prescribing clinician: 8/30/2021            Jayshree Adams MA  08/28/21, 10:45 EDT

## 2021-08-31 ENCOUNTER — OFFICE VISIT (OUTPATIENT)
Dept: FAMILY MEDICINE CLINIC | Facility: CLINIC | Age: 66
End: 2021-08-31

## 2021-08-31 VITALS
DIASTOLIC BLOOD PRESSURE: 70 MMHG | TEMPERATURE: 96.9 F | SYSTOLIC BLOOD PRESSURE: 133 MMHG | HEART RATE: 100 BPM | HEIGHT: 67 IN | WEIGHT: 185.45 LBS | OXYGEN SATURATION: 96 % | BODY MASS INDEX: 29.11 KG/M2

## 2021-08-31 DIAGNOSIS — Z12.11 COLON CANCER SCREENING: ICD-10-CM

## 2021-08-31 DIAGNOSIS — E78.5 HYPERLIPIDEMIA LDL GOAL <70: Chronic | ICD-10-CM

## 2021-08-31 DIAGNOSIS — I10 ESSENTIAL HYPERTENSION: Primary | ICD-10-CM

## 2021-08-31 DIAGNOSIS — E11.9 TYPE 2 DIABETES MELLITUS WITHOUT COMPLICATION, WITH LONG-TERM CURRENT USE OF INSULIN (HCC): ICD-10-CM

## 2021-08-31 DIAGNOSIS — I25.119 CORONARY ARTERY DISEASE INVOLVING NATIVE CORONARY ARTERY OF NATIVE HEART WITH ANGINA PECTORIS (HCC): Chronic | ICD-10-CM

## 2021-08-31 DIAGNOSIS — Z79.4 TYPE 2 DIABETES MELLITUS WITHOUT COMPLICATION, WITH LONG-TERM CURRENT USE OF INSULIN (HCC): ICD-10-CM

## 2021-08-31 PROCEDURE — 99214 OFFICE O/P EST MOD 30 MIN: CPT | Performed by: INTERNAL MEDICINE

## 2021-08-31 NOTE — PROGRESS NOTES
Chief Complaint   Patient presents with   • Follow-up   • Hypertension   • Med Refill     amLODIPine        HPI:  Gla Pierce is a 66 y.o. male who presents today for follow-up hypertension.  He is taking medication as prescribed.  No other acute concerns today.    ROS:  Constitutional: no fevers, night sweats or unexplained weight loss  Eyes: no vision changes  ENT: no runny nose, ear pain, sore throat  Cardio: no chest pain, palpitations  Pulm: no shortness of breath, wheezing, or cough  GI: no abdominal pain or changes in bowel movements  : no difficulty urinating  MSK: no difficulty ambulating, no joint pain  Neuro: no weakness, dizziness or headache  Psych: no trouble sleeping  Endo: no change in appetite      Past Medical History:   Diagnosis Date   • CAD (coronary artery disease)    • Diabetes mellitus (CMS/HCC)    • Heart attack (CMS/HCC)    • Hyperlipidemia    • Hypertension       Family History   Problem Relation Age of Onset   • Diabetes Mother    • Aneurysm Mother    • Hypertension Father    • Diabetes Father    • Heart attack Father    • Diabetes Sister    • No Known Problems Brother    • Diabetes Maternal Grandmother    • Hypertension Maternal Grandmother    • Diabetes Maternal Grandfather    • Hypertension Maternal Grandfather    • Diabetes Paternal Grandmother    • Hypertension Paternal Grandmother    • Diabetes Paternal Grandfather    • Hypertension Paternal Grandfather    • Diabetes Sister    • Diabetes Sister    • Cancer Sister    • Hypertension Brother    • Heart disease Brother    • Diabetes Brother    • Hypertension Brother       Social History     Socioeconomic History   • Marital status:      Spouse name: Not on file   • Number of children: Not on file   • Years of education: Not on file   • Highest education level: Not on file   Tobacco Use   • Smoking status: Former Smoker     Packs/day: 0.25     Years: 15.00     Pack years: 3.75   • Smokeless tobacco: Never Used   •  "Tobacco comment: 40 years ago   Vaping Use   • Vaping Use: Never used   Substance and Sexual Activity   • Alcohol use: No   • Drug use: No   • Sexual activity: Defer      Allergies   Allergen Reactions   • Hydrochlorothiazide Other (See Comments)     Pt states it makes his blood pressure \"real low\"   • Crestor [Rosuvastatin Calcium] Myalgia   • Lipitor [Atorvastatin Calcium] Myalgia   • Penicillins Rash      Immunization History   Administered Date(s) Administered   • FLUAD TRI 65YR+ 02/14/2013   • Flu Vaccine Quad PF >36MO 10/04/2019   • FluLaval >6 Months 11/26/2014, 10/04/2019, 10/23/2020   • Pneumococcal Conjugate 13-Valent (PCV13) 08/03/2015   • Pneumococcal Polysaccharide (PPSV23) 07/14/2008   • Tdap 02/13/2009        PE:  Vitals:    08/31/21 0922   BP: 133/70   Pulse: 100   Temp: 96.9 °F (36.1 °C)   SpO2: 96%      Body mass index is 29.04 kg/m².    Gen Appearance: NAD  HEENT: Normocephalic, PERRLA, no thyromegaly, trache midline  Heart: RRR, normal S1 and S2, no murmur  Lungs: CTA b/l, no wheezing, no crackles  Abdomen: Soft, non-tender, non-distended, no guarding and BSx4  MSK: Moves all extremities well, normal gait, no peripheral edema  Pulses: Palpable and equal b/l  Lymph nodes: No palpable lymphadenopathy   Neuro: No focal deficits      Current Outpatient Medications   Medication Sig Dispense Refill   • amLODIPine (NORVASC) 10 MG tablet Take 1 tablet by mouth Daily. 30 tablet 0   • aspirin 81 MG EC tablet Take 1 tablet by mouth Daily. 90 tablet 3   • carvedilol (Coreg) 25 MG tablet Take 1 tablet by mouth 2 (Two) Times a Day With Meals. 180 tablet 3   • clopidogrel (PLAVIX) 75 MG tablet Take 1 tablet by mouth Daily. 90 tablet 3   • ezetimibe (ZETIA) 10 MG tablet Take 1 tablet by mouth Daily. 90 tablet 3   • Insulin Glargine (BASAGLAR KWIKPEN) 100 UNIT/ML injection pen Inject 100 Units under the skin into the appropriate area as directed Daily. 10 pen 11   • Insulin Pen Needle (Pen Needles 5/16\") 31G X 8 " MM misc 1 each Daily. 30 each 5   • losartan (Cozaar) 100 MG tablet Take 1 tablet by mouth Daily. 90 tablet 3   • metFORMIN (GLUCOPHAGE) 1000 MG tablet TAKE ONE TABLET BY MOUTH TWICE A DAY WITH MEALS 90 tablet 3   • Multiple Vitamins-Minerals (CENTRUM SILVER PO) Take  by mouth Daily.     • rosuvastatin (CRESTOR) 10 MG tablet Take 1 tablet by mouth Every Night. 90 tablet 1   • sildenafil (VIAGRA) 100 MG tablet TAKE ONE TABLET BY MOUTHDAILY AS NEEDED FOR ERECTILE DYSFUNCTION 10 tablet 0     No current facility-administered medications for this visit.        Diagnoses and all orders for this visit:    1. Essential hypertension (Primary)  Pressure well controlled now that he is taking medication.  2. Colon cancer screening  -     Ambulatory Referral For Screening Colonoscopy    3. Hyperlipidemia LDL goal <70  Continue Zetia, no myalgias  4. Coronary artery disease involving native coronary artery of native heart with angina pectoris (CMS/HCC)  Follow-up with cardiology as scheduled.  5. Type 2 diabetes mellitus without complication, with long-term current use of insulin (CMS/HCC)  A1c well controlled 4 weeks ago, repeat in 3 months.  Continue current medication.       Return in about 3 months (around 11/30/2021).     Please note that portions of this document were completed with a voice recognition program. Efforts were made to edit the dictations, but occasionally words are mis-transcribed.

## 2021-09-07 ENCOUNTER — HOSPITAL ENCOUNTER (OUTPATIENT)
Dept: ONCOLOGY | Facility: HOSPITAL | Age: 66
Setting detail: INFUSION SERIES
Discharge: HOME | End: 2021-09-07

## 2021-09-07 VITALS
BODY MASS INDEX: 28.72 KG/M2 | TEMPERATURE: 97.6 F | RESPIRATION RATE: 16 BRPM | WEIGHT: 183 LBS | HEIGHT: 67 IN | HEART RATE: 89 BPM | DIASTOLIC BLOOD PRESSURE: 75 MMHG | SYSTOLIC BLOOD PRESSURE: 147 MMHG

## 2021-09-07 DIAGNOSIS — D75.1 POLYCYTHEMIA: ICD-10-CM

## 2021-09-07 LAB
FERRITIN SERPL-MCNC: 15.68 NG/ML (ref 30–400)
HCT VFR BLD AUTO: 42.8 % (ref 37.5–51)
HGB BLD-MCNC: 13.5 G/DL (ref 13–17.7)

## 2021-09-07 PROCEDURE — 82728 ASSAY OF FERRITIN: CPT | Performed by: INTERNAL MEDICINE

## 2021-09-07 PROCEDURE — 36415 COLL VENOUS BLD VENIPUNCTURE: CPT

## 2021-09-07 PROCEDURE — 85018 HEMOGLOBIN: CPT | Performed by: INTERNAL MEDICINE

## 2021-09-07 PROCEDURE — 85014 HEMATOCRIT: CPT | Performed by: INTERNAL MEDICINE

## 2021-09-14 ENCOUNTER — APPOINTMENT (OUTPATIENT)
Dept: ONCOLOGY | Facility: HOSPITAL | Age: 66
End: 2021-09-14

## 2021-09-29 RX ORDER — SOD SULF/POT CHLORIDE/MAG SULF 1.479 G
1 TABLET ORAL ONCE
Qty: 24 TABLET | Refills: 0 | Status: SHIPPED | OUTPATIENT
Start: 2021-09-29 | End: 2021-09-29

## 2021-10-06 DIAGNOSIS — I10 ESSENTIAL HYPERTENSION: ICD-10-CM

## 2021-10-06 RX ORDER — AMLODIPINE BESYLATE 10 MG/1
TABLET ORAL
Qty: 90 TABLET | Refills: 3 | Status: SHIPPED | OUTPATIENT
Start: 2021-10-06 | End: 2022-05-03

## 2021-10-06 NOTE — TELEPHONE ENCOUNTER
Last Office Visit: 08/31/2021  Next Office Visit: 11/30/2021        Please review pended refill request for any changes needed on refills or quantities. Thank you!

## 2021-10-12 ENCOUNTER — OUTSIDE FACILITY SERVICE (OUTPATIENT)
Dept: GASTROENTEROLOGY | Facility: CLINIC | Age: 66
End: 2021-10-12

## 2021-10-12 PROCEDURE — 45385 COLONOSCOPY W/LESION REMOVAL: CPT | Performed by: INTERNAL MEDICINE

## 2021-10-12 PROCEDURE — 88305 TISSUE EXAM BY PATHOLOGIST: CPT | Performed by: INTERNAL MEDICINE

## 2021-10-12 PROCEDURE — 45380 COLONOSCOPY AND BIOPSY: CPT | Performed by: INTERNAL MEDICINE

## 2021-10-13 ENCOUNTER — LAB REQUISITION (OUTPATIENT)
Dept: LAB | Facility: HOSPITAL | Age: 66
End: 2021-10-13

## 2021-10-13 DIAGNOSIS — K63.5 POLYP OF COLON: ICD-10-CM

## 2021-10-13 DIAGNOSIS — K57.30 DIVERTICULOSIS OF LARGE INTESTINE WITHOUT PERFORATION OR ABSCESS WITHOUT BLEEDING: ICD-10-CM

## 2021-10-13 DIAGNOSIS — Z12.11 ENCOUNTER FOR SCREENING FOR MALIGNANT NEOPLASM OF COLON: ICD-10-CM

## 2021-10-14 LAB
CYTO UR: NORMAL
LAB AP CASE REPORT: NORMAL
LAB AP CLINICAL INFORMATION: NORMAL
PATH REPORT.FINAL DX SPEC: NORMAL
PATH REPORT.GROSS SPEC: NORMAL

## 2021-11-02 ENCOUNTER — HOSPITAL ENCOUNTER (OUTPATIENT)
Dept: ONCOLOGY | Facility: HOSPITAL | Age: 66
Setting detail: INFUSION SERIES
Discharge: HOME | End: 2021-11-02

## 2021-11-02 VITALS
HEART RATE: 101 BPM | HEIGHT: 67 IN | RESPIRATION RATE: 18 BRPM | WEIGHT: 187 LBS | DIASTOLIC BLOOD PRESSURE: 81 MMHG | TEMPERATURE: 96.5 F | SYSTOLIC BLOOD PRESSURE: 146 MMHG | BODY MASS INDEX: 29.35 KG/M2

## 2021-11-02 DIAGNOSIS — D75.1 POLYCYTHEMIA: ICD-10-CM

## 2021-11-02 LAB
FERRITIN SERPL-MCNC: 20.34 NG/ML (ref 30–400)
HCT VFR BLD AUTO: 44.9 % (ref 37.5–51)

## 2021-11-02 PROCEDURE — G0463 HOSPITAL OUTPT CLINIC VISIT: HCPCS

## 2021-11-02 PROCEDURE — 85014 HEMATOCRIT: CPT | Performed by: INTERNAL MEDICINE

## 2021-11-02 PROCEDURE — 36415 COLL VENOUS BLD VENIPUNCTURE: CPT

## 2021-11-02 PROCEDURE — 82728 ASSAY OF FERRITIN: CPT | Performed by: INTERNAL MEDICINE

## 2021-11-30 ENCOUNTER — OFFICE VISIT (OUTPATIENT)
Dept: FAMILY MEDICINE CLINIC | Facility: CLINIC | Age: 66
End: 2021-11-30

## 2021-11-30 VITALS
DIASTOLIC BLOOD PRESSURE: 86 MMHG | WEIGHT: 189 LBS | HEART RATE: 80 BPM | BODY MASS INDEX: 29.66 KG/M2 | HEIGHT: 67 IN | SYSTOLIC BLOOD PRESSURE: 144 MMHG | OXYGEN SATURATION: 98 %

## 2021-11-30 DIAGNOSIS — E11.65 TYPE 2 DIABETES MELLITUS WITH HYPERGLYCEMIA, WITH LONG-TERM CURRENT USE OF INSULIN (HCC): ICD-10-CM

## 2021-11-30 DIAGNOSIS — I10 PRIMARY HYPERTENSION: Primary | ICD-10-CM

## 2021-11-30 DIAGNOSIS — Z79.4 TYPE 2 DIABETES MELLITUS WITH HYPERGLYCEMIA, WITH LONG-TERM CURRENT USE OF INSULIN (HCC): ICD-10-CM

## 2021-11-30 LAB
EXPIRATION DATE: NORMAL
HBA1C MFR BLD: 5.9 %
Lab: NORMAL

## 2021-11-30 PROCEDURE — 83036 HEMOGLOBIN GLYCOSYLATED A1C: CPT | Performed by: INTERNAL MEDICINE

## 2021-11-30 PROCEDURE — 99214 OFFICE O/P EST MOD 30 MIN: CPT | Performed by: INTERNAL MEDICINE

## 2021-11-30 PROCEDURE — 90471 IMMUNIZATION ADMIN: CPT | Performed by: INTERNAL MEDICINE

## 2021-11-30 PROCEDURE — 90686 IIV4 VACC NO PRSV 0.5 ML IM: CPT | Performed by: INTERNAL MEDICINE

## 2021-11-30 RX ORDER — INSULIN GLARGINE 100 [IU]/ML
100 INJECTION, SOLUTION SUBCUTANEOUS DAILY
Qty: 10 PEN | Refills: 11 | Status: SHIPPED | OUTPATIENT
Start: 2021-11-30 | End: 2022-04-27 | Stop reason: SDUPTHER

## 2021-11-30 RX ORDER — PEN NEEDLE, DIABETIC 31 GX5/16"
1 NEEDLE, DISPOSABLE MISCELLANEOUS DAILY
Qty: 30 EACH | Refills: 5 | Status: SHIPPED | OUTPATIENT
Start: 2021-11-30

## 2021-11-30 NOTE — PROGRESS NOTES
Chief Complaint   Patient presents with   • Diabetes     a1c check        HPI:  Gal Pierce is a 66 y.o. male who presents today for follow-up diabetes.  No episodes of hypoglycemia.    ROS:  Constitutional: no fevers, night sweats or unexplained weight loss  Eyes: no vision changes  ENT: no runny nose, ear pain, sore throat  Cardio: no chest pain, palpitations  Pulm: no shortness of breath, wheezing, or cough  GI: no abdominal pain or changes in bowel movements  : no difficulty urinating  MSK: no difficulty ambulating, no joint pain  Neuro: no weakness, dizziness or headache  Psych: no trouble sleeping  Endo: no change in appetite      Past Medical History:   Diagnosis Date   • CAD (coronary artery disease)    • Diabetes mellitus (HCC)    • Heart attack (HCC)    • Hyperlipidemia    • Hypertension       Family History   Problem Relation Age of Onset   • Diabetes Mother    • Aneurysm Mother    • Hypertension Father    • Diabetes Father    • Heart attack Father    • Diabetes Sister    • No Known Problems Brother    • Diabetes Maternal Grandmother    • Hypertension Maternal Grandmother    • Diabetes Maternal Grandfather    • Hypertension Maternal Grandfather    • Diabetes Paternal Grandmother    • Hypertension Paternal Grandmother    • Diabetes Paternal Grandfather    • Hypertension Paternal Grandfather    • Diabetes Sister    • Diabetes Sister    • Cancer Sister    • Hypertension Brother    • Heart disease Brother    • Diabetes Brother    • Hypertension Brother       Social History     Socioeconomic History   • Marital status:    Tobacco Use   • Smoking status: Former Smoker     Packs/day: 0.25     Years: 15.00     Pack years: 3.75     Types: Cigarettes   • Smokeless tobacco: Never Used   • Tobacco comment: 40 years ago   Vaping Use   • Vaping Use: Never used   Substance and Sexual Activity   • Alcohol use: No   • Drug use: No   • Sexual activity: Defer      Allergies   Allergen Reactions   •  "Hydrochlorothiazide Other (See Comments)     Pt states it makes his blood pressure \"real low\"   • Crestor [Rosuvastatin Calcium] Myalgia   • Lipitor [Atorvastatin Calcium] Myalgia   • Penicillins Rash      Immunization History   Administered Date(s) Administered   • COVID-19 (MODERNA) 1st, 2nd, 3rd Dose Only 07/16/2021, 08/17/2021   • FLUAD TRI 65YR+ 02/14/2013   • Flu Vaccine Quad PF >36MO 10/04/2019   • Flu Vaccine Split Quad 11/26/2014   • FluLaval/Fluarix/Fluzone >6 11/26/2014, 10/04/2019, 10/23/2020, 11/30/2021   • Influenza, Unspecified 02/14/2013   • Pneumococcal Conjugate 13-Valent (PCV13) 08/03/2015   • Pneumococcal Polysaccharide (PPSV23) 07/14/2008   • Tdap 02/13/2009        PE:  Vitals:    11/30/21 0927   BP: 144/86   Pulse: 80   SpO2: 98%      Body mass index is 29.6 kg/m².    Gen Appearance: NAD  HEENT: Normocephalic, PERRLA, no thyromegaly, trache midline  Heart: RRR, normal S1 and S2, no murmur  Lungs: CTA b/l, no wheezing, no crackles  Abdomen: Soft, non-tender, non-distended, no guarding and BSx4  MSK: Moves all extremities well, normal gait, no peripheral edema  Pulses: Palpable and equal b/l  Lymph nodes: No palpable lymphadenopathy   Neuro: No focal deficits      Current Outpatient Medications   Medication Sig Dispense Refill   • amLODIPine (NORVASC) 10 MG tablet TAKE ONE TABLET BY MOUTH DAILY 90 tablet 3   • aspirin 81 MG EC tablet Take 1 tablet by mouth Daily. 90 tablet 3   • carvedilol (Coreg) 25 MG tablet Take 1 tablet by mouth 2 (Two) Times a Day With Meals. 180 tablet 3   • clopidogrel (PLAVIX) 75 MG tablet Take 1 tablet by mouth Daily. 90 tablet 3   • ezetimibe (ZETIA) 10 MG tablet Take 1 tablet by mouth Daily. 90 tablet 3   • losartan (Cozaar) 100 MG tablet Take 1 tablet by mouth Daily. 90 tablet 3   • Multiple Vitamins-Minerals (CENTRUM SILVER PO) Take  by mouth Daily.     • rosuvastatin (CRESTOR) 10 MG tablet Take 1 tablet by mouth Every Night. 90 tablet 1   • sildenafil (VIAGRA) 100 " "MG tablet TAKE ONE TABLET BY MOUTHDAILY AS NEEDED FOR ERECTILE DYSFUNCTION 10 tablet 0   • Insulin Glargine (BASAGLAR KWIKPEN) 100 UNIT/ML injection pen Inject 100 Units under the skin into the appropriate area as directed Daily. 10 pen 11   • Insulin Pen Needle (Pen Needles 5/16\") 31G X 8 MM misc 1 each Daily. 30 each 5   • metFORMIN (GLUCOPHAGE) 1000 MG tablet Take 1 tablet by mouth 2 (Two) Times a Day With Meals. 90 tablet 3     No current facility-administered medications for this visit.      A1c well controlled, continue current dose insulin.  Systolic blood pressure slightly elevated today.  Recommend monitoring blood pressures 2 weeks before follow-up appointment.  Bring home blood pressure cuff as well.    Diagnoses and all orders for this visit:    1. Primary hypertension (Primary)    2. Type 2 diabetes mellitus with hyperglycemia, with long-term current use of insulin (HCC)  -     metFORMIN (GLUCOPHAGE) 1000 MG tablet; Take 1 tablet by mouth 2 (Two) Times a Day With Meals.  Dispense: 90 tablet; Refill: 3  -     Insulin Glargine (BASAGLAR KWIKPEN) 100 UNIT/ML injection pen; Inject 100 Units under the skin into the appropriate area as directed Daily.  Dispense: 10 pen; Refill: 11  -     POC Glycosylated Hemoglobin (Hb A1C)    Other orders  -     Insulin Pen Needle (Pen Needles 5/16\") 31G X 8 MM misc; 1 each Daily.  Dispense: 30 each; Refill: 5  -     FluLaval/Fluarix/Fluzone >6 Months (2164-8009)         Return in about 3 months (around 2/28/2022) for Annual.     Dictated Utilizing Dragon Dictation    Please note that portions of this note were completed with a voice recognition program.    Part of this note may be an electronic transcription/translation of spoken language to printed text using the Dragon Dictation System.  "

## 2021-12-13 ENCOUNTER — TELEPHONE (OUTPATIENT)
Dept: FAMILY MEDICINE CLINIC | Facility: CLINIC | Age: 66
End: 2021-12-13

## 2021-12-13 ENCOUNTER — TELEPHONE (OUTPATIENT)
Dept: CARDIOLOGY | Facility: CLINIC | Age: 66
End: 2021-12-13

## 2021-12-13 NOTE — TELEPHONE ENCOUNTER
Patient is requesting Jury Duty Excuse letter due to his health condition. He can be reached at [phone].    Thank you.

## 2021-12-13 NOTE — TELEPHONE ENCOUNTER
Patient called asking for a letter to get out of jury duty. Upon speaking with the patient, he became very argumentative and hung up the phone.

## 2021-12-13 NOTE — TELEPHONE ENCOUNTER
Spoke with patient further, he  is concerned that jury duty is too stressfull for his heart and is requesting a letter excusing him

## 2021-12-14 NOTE — TELEPHONE ENCOUNTER
Contacted patient to relay message. He states he contact cardiology already and they referred him to PCP.    I told him that he most likely does not qualify in that case because he has not disqualifying illnesses that prevent him from working jury duty. He verbalized understanding and agreed

## 2021-12-28 ENCOUNTER — OFFICE VISIT (OUTPATIENT)
Dept: ONCOLOGY | Facility: CLINIC | Age: 66
End: 2021-12-28

## 2021-12-28 ENCOUNTER — HOSPITAL ENCOUNTER (OUTPATIENT)
Dept: ONCOLOGY | Facility: HOSPITAL | Age: 66
Setting detail: INFUSION SERIES
Discharge: HOME | End: 2021-12-28

## 2021-12-28 ENCOUNTER — LAB (OUTPATIENT)
Dept: LAB | Facility: HOSPITAL | Age: 66
End: 2021-12-28

## 2021-12-28 VITALS — DIASTOLIC BLOOD PRESSURE: 72 MMHG | SYSTOLIC BLOOD PRESSURE: 155 MMHG

## 2021-12-28 VITALS
DIASTOLIC BLOOD PRESSURE: 82 MMHG | HEIGHT: 67 IN | SYSTOLIC BLOOD PRESSURE: 177 MMHG | WEIGHT: 189.9 LBS | RESPIRATION RATE: 18 BRPM | HEART RATE: 80 BPM | TEMPERATURE: 96.8 F | BODY MASS INDEX: 29.81 KG/M2 | OXYGEN SATURATION: 91 %

## 2021-12-28 DIAGNOSIS — D75.1 POLYCYTHEMIA: Primary | ICD-10-CM

## 2021-12-28 LAB
ERYTHROCYTE [DISTWIDTH] IN BLOOD BY AUTOMATED COUNT: 20.8 % (ref 12.3–15.4)
HCT VFR BLD AUTO: 48.6 % (ref 37.5–51)
HCT VFR BLD AUTO: 48.6 % (ref 37.5–51)
HGB BLD-MCNC: 15.2 G/DL (ref 13–17.7)
LYMPHOCYTES # BLD AUTO: 2.8 10*3/MM3 (ref 0.7–3.1)
LYMPHOCYTES NFR BLD AUTO: 40.7 % (ref 19.6–45.3)
MCH RBC QN AUTO: 24.4 PG (ref 26.6–33)
MCHC RBC AUTO-ENTMCNC: 31.2 G/DL (ref 31.5–35.7)
MCV RBC AUTO: 78.4 FL (ref 79–97)
MONOCYTES # BLD AUTO: 0.6 10*3/MM3 (ref 0.1–0.9)
MONOCYTES NFR BLD AUTO: 8.3 % (ref 5–12)
NEUTROPHILS NFR BLD AUTO: 3.5 10*3/MM3 (ref 1.7–7)
NEUTROPHILS NFR BLD AUTO: 51 % (ref 42.7–76)
PLATELET # BLD AUTO: 553 10*3/MM3 (ref 140–450)
PMV BLD AUTO: 7 FL (ref 6–12)
RBC # BLD AUTO: 6.2 10*6/MM3 (ref 4.14–5.8)
WBC NRBC COR # BLD: 6.8 10*3/MM3 (ref 3.4–10.8)

## 2021-12-28 PROCEDURE — 99213 OFFICE O/P EST LOW 20 MIN: CPT | Performed by: NURSE PRACTITIONER

## 2021-12-28 PROCEDURE — 99195 PHLEBOTOMY: CPT

## 2021-12-28 PROCEDURE — 36415 COLL VENOUS BLD VENIPUNCTURE: CPT | Performed by: NURSE PRACTITIONER

## 2021-12-28 PROCEDURE — 85025 COMPLETE CBC W/AUTO DIFF WBC: CPT

## 2021-12-28 PROCEDURE — 85014 HEMATOCRIT: CPT | Performed by: INTERNAL MEDICINE

## 2021-12-28 NOTE — PROGRESS NOTES
DATE OF VISIT: 12/28/2021    REASON FOR VISIT: Followup for primary polycythemia vera     HISTORY OF PRESENT ILLNESS: The patient is a very pleasant 66 y.o. male  with past medical history significant for polycythemia vera diagnosed August 2020.  The patient was started on phlebotomy December 2020.  The patient is here today for scheduled follow-up visit.    SUBJECTIVE:The patient is here today by himself. He has been doing well since his last visit. He has not needed phlebotomy since July 2021. He denies headaches or increased fatigue. He has had no fevers, chills, or persistent night sweats. He has been compliant with use of aspirin daily.     PAST MEDICAL HISTORY/SOCIAL HISTORY/FAMILY HISTORY: Reviewed by me and unchanged from my documentation done on 12/28/21.    Review of Systems   Constitutional: Positive for fatigue. Negative for activity change, appetite change, chills, fever and unexpected weight change.   HENT: Negative for hearing loss, mouth sores, nosebleeds, sore throat and trouble swallowing.    Eyes: Negative for visual disturbance.   Respiratory: Negative for cough, chest tightness, shortness of breath and wheezing.    Cardiovascular: Negative for chest pain, palpitations and leg swelling.   Gastrointestinal: Negative for abdominal distention, abdominal pain, blood in stool, constipation, diarrhea, nausea, rectal pain and vomiting.   Endocrine: Negative for cold intolerance and heat intolerance.   Genitourinary: Negative for difficulty urinating, dysuria, frequency and urgency.   Musculoskeletal: Negative for arthralgias, back pain, gait problem, joint swelling and myalgias.   Skin: Negative for rash.   Neurological: Negative for dizziness, tremors, syncope, weakness, light-headedness, numbness and headaches.   Hematological: Negative for adenopathy. Does not bruise/bleed easily.   Psychiatric/Behavioral: Negative for confusion, sleep disturbance and suicidal ideas. The patient is not  "nervous/anxious.          Current Outpatient Medications:   •  amLODIPine (NORVASC) 10 MG tablet, TAKE ONE TABLET BY MOUTH DAILY, Disp: 90 tablet, Rfl: 3  •  aspirin 81 MG EC tablet, Take 1 tablet by mouth Daily., Disp: 90 tablet, Rfl: 3  •  carvedilol (Coreg) 25 MG tablet, Take 1 tablet by mouth 2 (Two) Times a Day With Meals., Disp: 180 tablet, Rfl: 3  •  clopidogrel (PLAVIX) 75 MG tablet, Take 1 tablet by mouth Daily., Disp: 90 tablet, Rfl: 3  •  ezetimibe (ZETIA) 10 MG tablet, Take 1 tablet by mouth Daily., Disp: 90 tablet, Rfl: 3  •  Insulin Glargine (BASAGLAR KWIKPEN) 100 UNIT/ML injection pen, Inject 100 Units under the skin into the appropriate area as directed Daily., Disp: 10 pen, Rfl: 11  •  Insulin Pen Needle (Pen Needles 5/16\") 31G X 8 MM misc, 1 each Daily., Disp: 30 each, Rfl: 5  •  losartan (Cozaar) 100 MG tablet, Take 1 tablet by mouth Daily., Disp: 90 tablet, Rfl: 3  •  metFORMIN (GLUCOPHAGE) 1000 MG tablet, Take 1 tablet by mouth 2 (Two) Times a Day With Meals., Disp: 90 tablet, Rfl: 3  •  Multiple Vitamins-Minerals (CENTRUM SILVER PO), Take  by mouth Daily., Disp: , Rfl:   •  rosuvastatin (CRESTOR) 10 MG tablet, Take 1 tablet by mouth Every Night., Disp: 90 tablet, Rfl: 1  •  sildenafil (VIAGRA) 100 MG tablet, TAKE ONE TABLET BY MOUTHDAILY AS NEEDED FOR ERECTILE DYSFUNCTION, Disp: 10 tablet, Rfl: 0    PHYSICAL EXAMINATION:   /82   Pulse 80   Temp 96.8 °F (36 °C)   Resp 18   Ht 170.2 cm (67.01\")   Wt 86.1 kg (189 lb 14.4 oz)   SpO2 91%   BMI 29.74 kg/m²    There were no vitals filed for this visit.            ECOG Performance Status: 1 - Symptomatic but completely ambulatory  General Appearance:  alert, cooperative, no apparent distress and appears stated age   Neurologic/Psychiatric: A&O x 3, gait steady, appropriate affect, strength 5/5 in all muscle groups   HEENT:  Normocephalic, without obvious abnormality, mucous membranes moist   Neck: Supple, symmetrical, trachea midline, no " adenopathy;  No thyromegaly, masses, or tenderness   Lungs:   Clear to auscultation bilaterally; respirations regular, even, and unlabored bilaterally   Heart:  Regular rate and rhythm, no murmurs appreciated   Abdomen:   Soft, non-tender, non-distended and no organomegaly   Lymph nodes: No cervical, supraclavicular, inguinal or axillary adenopathy noted   Extremities: Normal, atraumatic; no clubbing, cyanosis, or edema    Skin: No rashes, ulcers, or suspicious lesions noted     Office Visit on 12/28/2021   Component Date Value Ref Range Status   • Hematocrit 12/28/2021 48.6  37.5 - 51.0 % Final        No results found.    ASSESSMENT: The patient is a very pleasant 66 y.o. male  with polycythemia vera    PROBLEM LIST:   1.  Polycythemia vera:  A.  Presented with asymptomatic elevated hemoglobin hematocrit and platelets August 2020  B.  JAK2 V6 17F mutation  2.  Thrombocytosis  3.  Type 2 diabetes  4.  Hypertension  5.  Hypercholesterolemia    PLAN:  1.  I reviewed the lab results from today with the patient. His hematocrit is above target at 48.6. We will add CBC to check his platelet count and WBC and follow up on the results.   2. He will receive phlebotomy today. We will continue therapeutic phlebotomy every 12 weeks for hematocrit greater than 45.   3.  We will continue aspirin 81 mg daily.  will continue aspirin 81 mg daily.  4.  We will consider starting Hydrea if his platelets increase significantly or if he needs more frequent phlebotomy.   5.  The patient will follow-up with me in 6 months with repeated labs.  6. We will continue Norvasc 10 mg daily, Coreg 25 mg twice a day, and Cozaar 100 mg daily for hypertension.  8.  We will continue insulin and metformin for type 2 diabetes. This is being managed by Dr. Tovar.   9.  We will continue Crestor 10 mg daily for hypercholesterolemia.    Annabelle Negron, APRN  12/28/2021

## 2022-03-07 ENCOUNTER — OFFICE VISIT (OUTPATIENT)
Dept: FAMILY MEDICINE CLINIC | Facility: CLINIC | Age: 67
End: 2022-03-07

## 2022-03-07 VITALS
WEIGHT: 192 LBS | HEIGHT: 67 IN | DIASTOLIC BLOOD PRESSURE: 80 MMHG | OXYGEN SATURATION: 96 % | BODY MASS INDEX: 30.13 KG/M2 | HEART RATE: 95 BPM | SYSTOLIC BLOOD PRESSURE: 164 MMHG

## 2022-03-07 DIAGNOSIS — I10 ESSENTIAL HYPERTENSION: Chronic | ICD-10-CM

## 2022-03-07 DIAGNOSIS — Z79.4 TYPE 2 DIABETES MELLITUS WITH HYPERGLYCEMIA, WITH LONG-TERM CURRENT USE OF INSULIN: Primary | ICD-10-CM

## 2022-03-07 DIAGNOSIS — E11.65 TYPE 2 DIABETES MELLITUS WITH HYPERGLYCEMIA, WITH LONG-TERM CURRENT USE OF INSULIN: Primary | ICD-10-CM

## 2022-03-07 LAB
EXPIRATION DATE: NORMAL
HBA1C MFR BLD: 6.1 %
Lab: NORMAL

## 2022-03-07 PROCEDURE — 83036 HEMOGLOBIN GLYCOSYLATED A1C: CPT | Performed by: INTERNAL MEDICINE

## 2022-03-07 PROCEDURE — 99214 OFFICE O/P EST MOD 30 MIN: CPT | Performed by: INTERNAL MEDICINE

## 2022-03-07 NOTE — PROGRESS NOTES
Chief Complaint   Patient presents with   • Diabetes   • Hypertension              HPI:  Gal Pierce is a 66 y.o. male who presents today for follow-up diabetes and hypertension.  No acute concerns today.  Home blood pressure readings typically low 140s systolic.    ROS:  Constitutional: no fevers, night sweats or unexplained weight loss  Eyes: no vision changes  ENT: no runny nose, ear pain, sore throat  Cardio: no chest pain, palpitations  Pulm: no shortness of breath, wheezing, or cough  GI: no abdominal pain or changes in bowel movements  : no difficulty urinating  MSK: no difficulty ambulating, no joint pain  Neuro: no weakness, dizziness or headache  Psych: no trouble sleeping  Endo: no change in appetite      Past Medical History:   Diagnosis Date   • CAD (coronary artery disease)    • Diabetes mellitus (HCC)    • Heart attack (HCC)    • Hyperlipidemia    • Hypertension       Family History   Problem Relation Age of Onset   • Diabetes Mother    • Aneurysm Mother    • Hypertension Father    • Diabetes Father    • Heart attack Father    • Diabetes Sister    • No Known Problems Brother    • Diabetes Maternal Grandmother    • Hypertension Maternal Grandmother    • Diabetes Maternal Grandfather    • Hypertension Maternal Grandfather    • Diabetes Paternal Grandmother    • Hypertension Paternal Grandmother    • Diabetes Paternal Grandfather    • Hypertension Paternal Grandfather    • Diabetes Sister    • Diabetes Sister    • Cancer Sister    • Hypertension Brother    • Heart disease Brother    • Diabetes Brother    • Hypertension Brother       Social History     Socioeconomic History   • Marital status:    Tobacco Use   • Smoking status: Former Smoker     Packs/day: 0.25     Years: 15.00     Pack years: 3.75     Types: Cigarettes   • Smokeless tobacco: Never Used   • Tobacco comment: 40 years ago   Vaping Use   • Vaping Use: Never used   Substance and Sexual Activity   • Alcohol use: No   • Drug  "use: No   • Sexual activity: Defer      Allergies   Allergen Reactions   • Hydrochlorothiazide Other (See Comments)     Pt states it makes his blood pressure \"real low\"   • Crestor [Rosuvastatin Calcium] Myalgia   • Lipitor [Atorvastatin Calcium] Myalgia   • Penicillins Rash      Immunization History   Administered Date(s) Administered   • COVID-19 (MODERNA) 1st, 2nd, 3rd Dose Only 07/16/2021, 08/17/2021   • FLUAD TRI 65YR+ 02/14/2013   • Flu Vaccine Quad PF >36MO 10/04/2019   • Flu Vaccine Split Quad 11/26/2014   • FluLaval/Fluarix/Fluzone >6 11/26/2014, 10/04/2019, 10/23/2020, 11/30/2021   • Influenza, Unspecified 02/14/2013   • Pneumococcal Conjugate 13-Valent (PCV13) 08/03/2015   • Pneumococcal Polysaccharide (PPSV23) 07/14/2008   • Tdap 02/13/2009        PE:  Vitals:    03/07/22 0900   BP: 164/80   Pulse: 95   SpO2: 96%      Body mass index is 30.06 kg/m².    Gen Appearance: NAD  HEENT: Normocephalic, PERRLA, no thyromegaly, trache midline  Heart: RRR, normal S1 and S2, no murmur  Lungs: CTA b/l, no wheezing, no crackles  Abdomen: Soft, non-tender, non-distended, no guarding and BSx4  MSK: Moves all extremities well, normal gait, no peripheral edema  Pulses: Palpable and equal b/l  Lymph nodes: No palpable lymphadenopathy   Neuro: No focal deficits      Current Outpatient Medications   Medication Sig Dispense Refill   • amLODIPine (NORVASC) 10 MG tablet TAKE ONE TABLET BY MOUTH DAILY 90 tablet 3   • aspirin 81 MG EC tablet Take 1 tablet by mouth Daily. 90 tablet 3   • carvedilol (Coreg) 25 MG tablet Take 1 tablet by mouth 2 (Two) Times a Day With Meals. 180 tablet 3   • clopidogrel (PLAVIX) 75 MG tablet Take 1 tablet by mouth Daily. 90 tablet 3   • ezetimibe (ZETIA) 10 MG tablet Take 1 tablet by mouth Daily. 90 tablet 3   • Insulin Glargine (BASAGLAR KWIKPEN) 100 UNIT/ML injection pen Inject 100 Units under the skin into the appropriate area as directed Daily. 10 pen 11   • Insulin Pen Needle (Pen Needles " "5/16\") 31G X 8 MM misc 1 each Daily. 30 each 5   • losartan (Cozaar) 100 MG tablet Take 1 tablet by mouth Daily. 90 tablet 3   • metFORMIN (GLUCOPHAGE) 1000 MG tablet Take 1 tablet by mouth 2 (Two) Times a Day With Meals. 90 tablet 3   • Multiple Vitamins-Minerals (CENTRUM SILVER PO) Take  by mouth Daily.     • rosuvastatin (CRESTOR) 10 MG tablet Take 1 tablet by mouth Every Night. 90 tablet 1   • sildenafil (VIAGRA) 100 MG tablet TAKE ONE TABLET BY MOUTHDAILY AS NEEDED FOR ERECTILE DYSFUNCTION 10 tablet 0     No current facility-administered medications for this visit.        Diagnoses and all orders for this visit:    1. Type 2 diabetes mellitus with hyperglycemia, with long-term current use of insulin (HCC) (Primary)  -     POC Glycosylated Hemoglobin (Hb A1C)  A1c well controlled, 4-5 well-controlled A1c is less than 7%.  Recommend checking A1c every 6 months.  2. Essential hypertension  High systolic reading today.  Relatively well controlled at home.  He has tried additional blood pressure medications in the past with dizziness.  Continue to monitor blood pressure, no change in medications.       Return in about 6 months (around 9/7/2022) for a1c, Annual.     Dictated Utilizing Dragon Dictation    Please note that portions of this note were completed with a voice recognition program.    Part of this note may be an electronic transcription/translation of spoken language to printed text using the Dragon Dictation System.  "

## 2022-03-22 ENCOUNTER — HOSPITAL ENCOUNTER (OUTPATIENT)
Dept: ONCOLOGY | Facility: HOSPITAL | Age: 67
Setting detail: INFUSION SERIES
Discharge: HOME | End: 2022-03-22

## 2022-03-22 VITALS
HEART RATE: 71 BPM | TEMPERATURE: 97 F | DIASTOLIC BLOOD PRESSURE: 79 MMHG | HEIGHT: 67 IN | SYSTOLIC BLOOD PRESSURE: 177 MMHG | RESPIRATION RATE: 18 BRPM | BODY MASS INDEX: 30.07 KG/M2

## 2022-03-22 DIAGNOSIS — D75.1 POLYCYTHEMIA: ICD-10-CM

## 2022-03-22 LAB
FERRITIN SERPL-MCNC: 21.98 NG/ML (ref 30–400)
HCT VFR BLD AUTO: 45.2 % (ref 37.5–51)

## 2022-03-22 PROCEDURE — 36415 COLL VENOUS BLD VENIPUNCTURE: CPT

## 2022-03-22 PROCEDURE — 82728 ASSAY OF FERRITIN: CPT | Performed by: NURSE PRACTITIONER

## 2022-03-22 PROCEDURE — 85014 HEMATOCRIT: CPT | Performed by: NURSE PRACTITIONER

## 2022-03-22 NOTE — PROGRESS NOTES
Hct 45.2  Hold parameter is less than 45.  Patient wants to skip phlebotomy today because it is so close to the parameter. He will return for a recheck in 12 weeks. Patient verbalizes understanding of calling if he starts to feel bad before next appointment .

## 2022-04-12 ENCOUNTER — OFFICE VISIT (OUTPATIENT)
Dept: FAMILY MEDICINE CLINIC | Facility: CLINIC | Age: 67
End: 2022-04-12

## 2022-04-12 VITALS
SYSTOLIC BLOOD PRESSURE: 162 MMHG | DIASTOLIC BLOOD PRESSURE: 72 MMHG | OXYGEN SATURATION: 97 % | BODY MASS INDEX: 30.13 KG/M2 | WEIGHT: 192 LBS | HEART RATE: 78 BPM | HEIGHT: 67 IN

## 2022-04-12 DIAGNOSIS — J40 BRONCHITIS: ICD-10-CM

## 2022-04-12 DIAGNOSIS — J06.9 UPPER RESPIRATORY TRACT INFECTION, UNSPECIFIED TYPE: Primary | ICD-10-CM

## 2022-04-12 DIAGNOSIS — I10 PRIMARY HYPERTENSION: ICD-10-CM

## 2022-04-12 PROCEDURE — 99214 OFFICE O/P EST MOD 30 MIN: CPT | Performed by: INTERNAL MEDICINE

## 2022-04-12 RX ORDER — AZITHROMYCIN 250 MG/1
TABLET, FILM COATED ORAL
Qty: 6 TABLET | Refills: 0 | Status: SHIPPED | OUTPATIENT
Start: 2022-04-12 | End: 2022-05-03

## 2022-04-12 RX ORDER — METHYLPREDNISOLONE 4 MG/1
TABLET ORAL
Qty: 1 EACH | Refills: 0 | Status: SHIPPED | OUTPATIENT
Start: 2022-04-12 | End: 2022-05-03

## 2022-04-12 NOTE — PROGRESS NOTES
Chief Complaint   Patient presents with   • Sinus Problem     Drainage running into throat making patient cough. X3-4 days    • Cough       HPI:  Gal Pierce is a 66 y.o. male who presents today for sinus congestion, sore throat and productive cough for the past 4 days.  Denies any fevers.    ROS:  Constitutional: no fevers, night sweats or unexplained weight loss  Eyes: no vision changes  ENT: + runny nose,+ ear pain, +sore throat  Cardio: no chest pain, palpitations  Pulm: no shortness of breath, wheezing, + cough  GI: no abdominal pain or changes in bowel movements  : no difficulty urinating  MSK: no difficulty ambulating, no joint pain  Neuro: no weakness, dizziness or headache  Psych: no trouble sleeping  Endo: no change in appetite      Past Medical History:   Diagnosis Date   • CAD (coronary artery disease)    • Diabetes mellitus (HCC)    • Heart attack (HCC)    • Hyperlipidemia    • Hypertension       Family History   Problem Relation Age of Onset   • Diabetes Mother    • Aneurysm Mother    • Hypertension Father    • Diabetes Father    • Heart attack Father    • Diabetes Sister    • No Known Problems Brother    • Diabetes Maternal Grandmother    • Hypertension Maternal Grandmother    • Diabetes Maternal Grandfather    • Hypertension Maternal Grandfather    • Diabetes Paternal Grandmother    • Hypertension Paternal Grandmother    • Diabetes Paternal Grandfather    • Hypertension Paternal Grandfather    • Diabetes Sister    • Diabetes Sister    • Cancer Sister    • Hypertension Brother    • Heart disease Brother    • Diabetes Brother    • Hypertension Brother       Social History     Socioeconomic History   • Marital status:    Tobacco Use   • Smoking status: Former Smoker     Packs/day: 0.25     Years: 15.00     Pack years: 3.75     Types: Cigarettes   • Smokeless tobacco: Never Used   • Tobacco comment: 40 years ago   Vaping Use   • Vaping Use: Never used   Substance and Sexual Activity   •  "Alcohol use: No   • Drug use: No   • Sexual activity: Defer      Allergies   Allergen Reactions   • Hydrochlorothiazide Other (See Comments)     Pt states it makes his blood pressure \"real low\"   • Crestor [Rosuvastatin Calcium] Myalgia   • Lipitor [Atorvastatin Calcium] Myalgia   • Penicillins Rash      Immunization History   Administered Date(s) Administered   • COVID-19 (MODERNA) 1st, 2nd, 3rd Dose Only 07/16/2021, 08/17/2021   • FLUAD TRI 65YR+ 02/14/2013   • Flu Vaccine Quad PF >36MO 10/04/2019   • Flu Vaccine Split Quad 11/26/2014   • FluLaval/Fluarix/Fluzone >6 11/26/2014, 10/04/2019, 10/23/2020, 11/30/2021   • Influenza, Unspecified 02/14/2013   • Pneumococcal Conjugate 13-Valent (PCV13) 08/03/2015   • Pneumococcal Polysaccharide (PPSV23) 07/14/2008   • Tdap 02/13/2009        PE:  Vitals:    04/12/22 0800   BP: 162/72   Pulse: 78   SpO2: 97%      Body mass index is 30.07 kg/m².    Gen Appearance: NAD  HEENT: Normocephalic, PERRLA, no thyromegaly, trache midline  Heart: RRR, normal S1 and S2, no murmur  Lungs: CTA b/l, no wheezing, no crackles  Abdomen: Soft, non-tender, non-distended, no guarding and BSx4  MSK: Moves all extremities well, normal gait, no peripheral edema  Pulses: Palpable and equal b/l  Lymph nodes: No palpable lymphadenopathy   Neuro: No focal deficits      Current Outpatient Medications   Medication Sig Dispense Refill   • amLODIPine (NORVASC) 10 MG tablet TAKE ONE TABLET BY MOUTH DAILY 90 tablet 3   • aspirin 81 MG EC tablet Take 1 tablet by mouth Daily. 90 tablet 3   • azithromycin (Zithromax Z-Grant) 250 MG tablet Take 2 tablets by mouth on day 1, then 1 tablet daily on days 2-5 6 tablet 0   • carvedilol (Coreg) 25 MG tablet Take 1 tablet by mouth 2 (Two) Times a Day With Meals. 180 tablet 3   • clopidogrel (PLAVIX) 75 MG tablet Take 1 tablet by mouth Daily. 90 tablet 3   • ezetimibe (ZETIA) 10 MG tablet Take 1 tablet by mouth Daily. 90 tablet 3   • Insulin Glargine (BASAGLAR KWIKPEN) 100 " "UNIT/ML injection pen Inject 100 Units under the skin into the appropriate area as directed Daily. 10 pen 11   • Insulin Pen Needle (Pen Needles 5/16\") 31G X 8 MM misc 1 each Daily. 30 each 5   • losartan (Cozaar) 100 MG tablet Take 1 tablet by mouth Daily. 90 tablet 3   • metFORMIN (GLUCOPHAGE) 1000 MG tablet Take 1 tablet by mouth 2 (Two) Times a Day With Meals. 90 tablet 3   • methylPREDNISolone (MEDROL) 4 MG dose pack Take as directed on package instructions. 1 each 0   • Multiple Vitamins-Minerals (CENTRUM SILVER PO) Take  by mouth Daily.     • rosuvastatin (CRESTOR) 10 MG tablet Take 1 tablet by mouth Every Night. 90 tablet 1   • sildenafil (VIAGRA) 100 MG tablet TAKE ONE TABLET BY MOUTHDAILY AS NEEDED FOR ERECTILE DYSFUNCTION 10 tablet 0     No current facility-administered medications for this visit.      Recommend daily antihistamine such as Claritin or Zyrtec.  Recommend starting with steroids, if no improvement by Friday would recommend starting on antibiotics.  Symptoms for 4 days at this point.    Pressure uncontrolled, follow-up in 2 to 4 weeks for recheck.  Will likely resume HCTZ.    Diagnoses and all orders for this visit:    1. Upper respiratory tract infection, unspecified type (Primary)  -     azithromycin (Zithromax Z-Grant) 250 MG tablet; Take 2 tablets by mouth on day 1, then 1 tablet daily on days 2-5  Dispense: 6 tablet; Refill: 0  -     methylPREDNISolone (MEDROL) 4 MG dose pack; Take as directed on package instructions.  Dispense: 1 each; Refill: 0    2. Bronchitis    3. Primary hypertension         Return in about 3 weeks (around 5/3/2022).     Dictated Utilizing Dragon Dictation    Please note that portions of this note were completed with a voice recognition program.    Part of this note may be an electronic transcription/translation of spoken language to printed text using the Dragon Dictation System.  "

## 2022-04-27 DIAGNOSIS — E11.65 TYPE 2 DIABETES MELLITUS WITH HYPERGLYCEMIA, WITH LONG-TERM CURRENT USE OF INSULIN: ICD-10-CM

## 2022-04-27 DIAGNOSIS — Z79.4 TYPE 2 DIABETES MELLITUS WITH HYPERGLYCEMIA, WITH LONG-TERM CURRENT USE OF INSULIN: ICD-10-CM

## 2022-04-27 RX ORDER — INSULIN GLARGINE 100 [IU]/ML
100 INJECTION, SOLUTION SUBCUTANEOUS DAILY
Qty: 10 PEN | Refills: 11 | Status: SHIPPED | OUTPATIENT
Start: 2022-04-27 | End: 2023-01-10 | Stop reason: SDUPTHER

## 2022-04-27 NOTE — TELEPHONE ENCOUNTER
Caller: KariGal Edward    Relationship: Self    Best call back number: [phone]    Requested Prescriptions:   Requested Prescriptions     Pending Prescriptions Disp Refills   • Insulin Glargine (BASAGLAR KWIKPEN) 100 UNIT/ML injection pen 10 pen 11     Sig: Inject 100 Units under the skin into the appropriate area as directed Daily.        Pharmacy where request should be sent: [facility] [address] [phone] [facility] [phone] [facility]     Additional details provided by patient: PATIENT STATES THAT HE HAS ONE DAY LEFT.    Does the patient have less than a 3 day supply:  [x] Yes  [] No    Tami Dominguez Rep   04/27/22 10:05 EDT

## 2022-04-27 NOTE — TELEPHONE ENCOUNTER
Rx Refill Note  Requested Prescriptions     Pending Prescriptions Disp Refills   • Insulin Glargine (BASAGLAR KWIKPEN) 100 UNIT/ML injection pen 10 pen 11     Sig: Inject 100 Units under the skin into the appropriate area as directed Daily.      Last office visit with prescribing clinician: 4/12/2022      Next office visit with prescribing clinician: 5/3/2022            Linette Perez MA  04/27/22, 13:21 EDT

## 2022-05-03 ENCOUNTER — OFFICE VISIT (OUTPATIENT)
Dept: FAMILY MEDICINE CLINIC | Facility: CLINIC | Age: 67
End: 2022-05-03

## 2022-05-03 VITALS
SYSTOLIC BLOOD PRESSURE: 156 MMHG | HEART RATE: 86 BPM | HEIGHT: 67 IN | BODY MASS INDEX: 29.66 KG/M2 | DIASTOLIC BLOOD PRESSURE: 80 MMHG | OXYGEN SATURATION: 97 % | WEIGHT: 189 LBS

## 2022-05-03 DIAGNOSIS — I10 ESSENTIAL HYPERTENSION: Chronic | ICD-10-CM

## 2022-05-03 DIAGNOSIS — Z79.4 TYPE 2 DIABETES MELLITUS WITH HYPERGLYCEMIA, WITH LONG-TERM CURRENT USE OF INSULIN: Primary | ICD-10-CM

## 2022-05-03 DIAGNOSIS — E11.65 TYPE 2 DIABETES MELLITUS WITH HYPERGLYCEMIA, WITH LONG-TERM CURRENT USE OF INSULIN: Primary | ICD-10-CM

## 2022-05-03 DIAGNOSIS — E78.5 HYPERLIPIDEMIA LDL GOAL <70: Chronic | ICD-10-CM

## 2022-05-03 PROCEDURE — 99214 OFFICE O/P EST MOD 30 MIN: CPT | Performed by: INTERNAL MEDICINE

## 2022-05-03 RX ORDER — LOSARTAN POTASSIUM AND HYDROCHLOROTHIAZIDE 12.5; 5 MG/1; MG/1
1 TABLET ORAL DAILY
Qty: 30 TABLET | Refills: 1 | Status: SHIPPED | OUTPATIENT
Start: 2022-05-03 | End: 2022-05-31

## 2022-05-03 RX ORDER — AMLODIPINE BESYLATE 5 MG/1
5 TABLET ORAL DAILY
Qty: 30 TABLET | Refills: 1 | Status: SHIPPED | OUTPATIENT
Start: 2022-05-03 | End: 2022-07-19

## 2022-05-03 NOTE — PROGRESS NOTES
Chief Complaint   Patient presents with   • Hypertension       HPI:  Gal Pierce is a 66 y.o. male who presents today for follow-up hypertension.    ROS:  Constitutional: no fevers, night sweats or unexplained weight loss  Eyes: no vision changes  ENT: no runny nose, ear pain, sore throat  Cardio: no chest pain, palpitations  Pulm: no shortness of breath, wheezing, or cough  GI: no abdominal pain or changes in bowel movements  : no difficulty urinating  MSK: no difficulty ambulating, no joint pain  Neuro: no weakness, dizziness or headache  Psych: no trouble sleeping  Endo: no change in appetite      Past Medical History:   Diagnosis Date   • CAD (coronary artery disease)    • Diabetes mellitus (HCC)    • Heart attack (HCC)    • Hyperlipidemia    • Hypertension       Family History   Problem Relation Age of Onset   • Diabetes Mother    • Aneurysm Mother    • Hypertension Father    • Diabetes Father    • Heart attack Father    • Diabetes Sister    • No Known Problems Brother    • Diabetes Maternal Grandmother    • Hypertension Maternal Grandmother    • Diabetes Maternal Grandfather    • Hypertension Maternal Grandfather    • Diabetes Paternal Grandmother    • Hypertension Paternal Grandmother    • Diabetes Paternal Grandfather    • Hypertension Paternal Grandfather    • Diabetes Sister    • Diabetes Sister    • Cancer Sister    • Hypertension Brother    • Heart disease Brother    • Diabetes Brother    • Hypertension Brother       Social History     Socioeconomic History   • Marital status:    Tobacco Use   • Smoking status: Former Smoker     Packs/day: 0.25     Years: 15.00     Pack years: 3.75     Types: Cigarettes   • Smokeless tobacco: Never Used   • Tobacco comment: 40 years ago   Vaping Use   • Vaping Use: Never used   Substance and Sexual Activity   • Alcohol use: No   • Drug use: No   • Sexual activity: Defer      Allergies   Allergen Reactions   • Hydrochlorothiazide Other (See Comments)      "Pt states it makes his blood pressure \"real low\"   • Crestor [Rosuvastatin Calcium] Myalgia   • Lipitor [Atorvastatin Calcium] Myalgia   • Penicillins Rash      Immunization History   Administered Date(s) Administered   • COVID-19 (MODERNA) 1st, 2nd, 3rd Dose Only 07/16/2021, 08/17/2021   • FLUAD TRI 65YR+ 02/14/2013   • Flu Vaccine Quad PF >36MO 10/04/2019   • Flu Vaccine Split Quad 11/26/2014   • FluLaval/Fluarix/Fluzone >6 11/26/2014, 10/04/2019, 10/23/2020, 11/30/2021   • Influenza, Unspecified 02/14/2013   • Pneumococcal Conjugate 13-Valent (PCV13) 08/03/2015   • Pneumococcal Polysaccharide (PPSV23) 07/14/2008   • Tdap 02/13/2009        PE:  Vitals:    05/03/22 0818   BP: 156/80   Pulse: 86   SpO2: 97%      Body mass index is 29.6 kg/m².    Gen Appearance: NAD  HEENT: Normocephalic, PERRLA, no thyromegaly, trache midline  Heart: RRR, normal S1 and S2, no murmur  Lungs: CTA b/l, no wheezing, no crackles  Abdomen: Soft, non-tender, non-distended, no guarding and BSx4  MSK: Moves all extremities well, normal gait, no peripheral edema  Pulses: Palpable and equal b/l  Lymph nodes: No palpable lymphadenopathy   Neuro: No focal deficits      Current Outpatient Medications   Medication Sig Dispense Refill   • aspirin 81 MG EC tablet Take 1 tablet by mouth Daily. 90 tablet 3   • carvedilol (Coreg) 25 MG tablet Take 1 tablet by mouth 2 (Two) Times a Day With Meals. 180 tablet 3   • clopidogrel (PLAVIX) 75 MG tablet Take 1 tablet by mouth Daily. 90 tablet 3   • ezetimibe (ZETIA) 10 MG tablet Take 1 tablet by mouth Daily. 90 tablet 3   • Insulin Glargine (BASAGLAR KWIKPEN) 100 UNIT/ML injection pen Inject 100 Units under the skin into the appropriate area as directed Daily. 10 pen 11   • Insulin Pen Needle (Pen Needles 5/16\") 31G X 8 MM misc 1 each Daily. 30 each 5   • metFORMIN (GLUCOPHAGE) 1000 MG tablet Take 1 tablet by mouth 2 (Two) Times a Day With Meals. 90 tablet 3   • Multiple Vitamins-Minerals (CENTRUM SILVER PO) " Take  by mouth Daily.     • rosuvastatin (CRESTOR) 10 MG tablet Take 1 tablet by mouth Every Night. 90 tablet 1   • sildenafil (VIAGRA) 100 MG tablet TAKE ONE TABLET BY MOUTHDAILY AS NEEDED FOR ERECTILE DYSFUNCTION 10 tablet 0   • amLODIPine (NORVASC) 5 MG tablet Take 1 tablet by mouth Daily. 30 tablet 1   • losartan-hydrochlorothiazide (Hyzaar) 50-12.5 MG per tablet Take 1 tablet by mouth Daily. 30 tablet 1     No current facility-administered medications for this visit.        Diagnoses and all orders for this visit:    1. Type 2 diabetes mellitus with hyperglycemia, with long-term current use of insulin (HCC) (Primary)  A1c controlled.  Continue current regimen.  2. Essential hypertension  Add on HCTZ.  He had orthostatic symptoms last time taking this medicine.  Will reduce other BP meds and see back in 4 weeks.  3. Hyperlipidemia LDL goal <70  Stable on statin.  Continue.  Other orders  -     losartan-hydrochlorothiazide (Hyzaar) 50-12.5 MG per tablet; Take 1 tablet by mouth Daily.  Dispense: 30 tablet; Refill: 1  -     amLODIPine (NORVASC) 5 MG tablet; Take 1 tablet by mouth Daily.  Dispense: 30 tablet; Refill: 1         Return in about 4 weeks (around 5/31/2022) for htn.     Dictated Utilizing Dragon Dictation    Please note that portions of this note were completed with a voice recognition program.    Part of this note may be an electronic transcription/translation of spoken language to printed text using the Dragon Dictation System.

## 2022-05-31 ENCOUNTER — OFFICE VISIT (OUTPATIENT)
Dept: FAMILY MEDICINE CLINIC | Facility: CLINIC | Age: 67
End: 2022-05-31

## 2022-05-31 VITALS
HEART RATE: 94 BPM | HEIGHT: 67 IN | OXYGEN SATURATION: 98 % | SYSTOLIC BLOOD PRESSURE: 186 MMHG | WEIGHT: 188 LBS | DIASTOLIC BLOOD PRESSURE: 82 MMHG | BODY MASS INDEX: 29.51 KG/M2

## 2022-05-31 DIAGNOSIS — Z79.4 TYPE 2 DIABETES MELLITUS WITH HYPERGLYCEMIA, WITH LONG-TERM CURRENT USE OF INSULIN: Primary | ICD-10-CM

## 2022-05-31 DIAGNOSIS — E11.65 TYPE 2 DIABETES MELLITUS WITH HYPERGLYCEMIA, WITH LONG-TERM CURRENT USE OF INSULIN: Primary | ICD-10-CM

## 2022-05-31 DIAGNOSIS — I10 PRIMARY HYPERTENSION: ICD-10-CM

## 2022-05-31 LAB
EXPIRATION DATE: NORMAL
HBA1C MFR BLD: 6.2 %
Lab: NORMAL

## 2022-05-31 PROCEDURE — 99214 OFFICE O/P EST MOD 30 MIN: CPT | Performed by: INTERNAL MEDICINE

## 2022-05-31 RX ORDER — AMLODIPINE BESYLATE 10 MG/1
10 TABLET ORAL DAILY
Qty: 90 TABLET | Refills: 3 | Status: SHIPPED | OUTPATIENT
Start: 2022-05-31 | End: 2022-07-19 | Stop reason: SDUPTHER

## 2022-05-31 NOTE — PROGRESS NOTES
Chief Complaint   Patient presents with   • Hypertension     4 wk fu    • Diabetes     A1c check        HPI:  Gal Pierce is a 66 y.o. male who presents today for follow-up hypertension and diabetes.  Unable to tolerate HCTZ.  Currently taking 100 mg losartan, along with amlodipine and Coreg.    ROS:  Constitutional: no fevers, night sweats or unexplained weight loss  Eyes: no vision changes  ENT: no runny nose, ear pain, sore throat  Cardio: no chest pain, palpitations  Pulm: no shortness of breath, wheezing, or cough  GI: no abdominal pain or changes in bowel movements  : no difficulty urinating  MSK: no difficulty ambulating, no joint pain  Neuro: no weakness, dizziness or headache  Psych: no trouble sleeping  Endo: no change in appetite      Past Medical History:   Diagnosis Date   • CAD (coronary artery disease)    • Diabetes mellitus (HCC)    • Heart attack (HCC)    • Hyperlipidemia    • Hypertension       Family History   Problem Relation Age of Onset   • Diabetes Mother    • Aneurysm Mother    • Hypertension Father    • Diabetes Father    • Heart attack Father    • Diabetes Sister    • No Known Problems Brother    • Diabetes Maternal Grandmother    • Hypertension Maternal Grandmother    • Diabetes Maternal Grandfather    • Hypertension Maternal Grandfather    • Diabetes Paternal Grandmother    • Hypertension Paternal Grandmother    • Diabetes Paternal Grandfather    • Hypertension Paternal Grandfather    • Diabetes Sister    • Diabetes Sister    • Cancer Sister    • Hypertension Brother    • Heart disease Brother    • Diabetes Brother    • Hypertension Brother       Social History     Socioeconomic History   • Marital status:    Tobacco Use   • Smoking status: Former Smoker     Packs/day: 0.25     Years: 15.00     Pack years: 3.75     Types: Cigarettes   • Smokeless tobacco: Never Used   • Tobacco comment: 40 years ago   Vaping Use   • Vaping Use: Never used   Substance and Sexual Activity  "  • Alcohol use: No   • Drug use: No   • Sexual activity: Defer      Allergies   Allergen Reactions   • Hydrochlorothiazide Other (See Comments)     Pt states it makes his blood pressure \"real low\"   • Crestor [Rosuvastatin Calcium] Myalgia   • Lipitor [Atorvastatin Calcium] Myalgia   • Penicillins Rash      Immunization History   Administered Date(s) Administered   • COVID-19 (MODERNA) 1st, 2nd, 3rd Dose Only 07/16/2021, 08/17/2021   • FLUAD TRI 65YR+ 02/14/2013   • Flu Vaccine Quad PF >36MO 10/04/2019   • Flu Vaccine Split Quad 11/26/2014   • FluLaval/Fluarix/Fluzone >6 11/26/2014, 10/04/2019, 10/23/2020, 11/30/2021   • Influenza, Unspecified 02/14/2013   • Pneumococcal Conjugate 13-Valent (PCV13) 08/03/2015   • Pneumococcal Polysaccharide (PPSV23) 07/14/2008   • Tdap 02/13/2009        PE:  Vitals:    05/31/22 0821   BP: (!) 186/82   Pulse: 94   SpO2: 98%      Body mass index is 29.44 kg/m².    Gen Appearance: NAD  HEENT: Normocephalic, PERRLA, no thyromegaly, trache midline  Heart: RRR, normal S1 and S2, no murmur  Lungs: CTA b/l, no wheezing, no crackles  Abdomen: Soft, non-tender, non-distended, no guarding and BSx4  MSK: Moves all extremities well, normal gait, no peripheral edema  Pulses: Palpable and equal b/l  Lymph nodes: No palpable lymphadenopathy   Neuro: No focal deficits      Current Outpatient Medications   Medication Sig Dispense Refill   • amLODIPine (NORVASC) 5 MG tablet Take 1 tablet by mouth Daily. 30 tablet 1   • aspirin 81 MG EC tablet Take 1 tablet by mouth Daily. 90 tablet 3   • carvedilol (Coreg) 25 MG tablet Take 1 tablet by mouth 2 (Two) Times a Day With Meals. 180 tablet 3   • clopidogrel (PLAVIX) 75 MG tablet Take 1 tablet by mouth Daily. 90 tablet 3   • ezetimibe (ZETIA) 10 MG tablet Take 1 tablet by mouth Daily. 90 tablet 3   • Insulin Glargine (BASAGLAR KWIKPEN) 100 UNIT/ML injection pen Inject 100 Units under the skin into the appropriate area as directed Daily. 10 pen 11   • " "Insulin Pen Needle (Pen Needles 5/16\") 31G X 8 MM misc 1 each Daily. 30 each 5   • metFORMIN (GLUCOPHAGE) 1000 MG tablet Take 1 tablet by mouth 2 (Two) Times a Day With Meals. 90 tablet 3   • Multiple Vitamins-Minerals (CENTRUM SILVER PO) Take  by mouth Daily.     • rosuvastatin (CRESTOR) 10 MG tablet Take 1 tablet by mouth Every Night. 90 tablet 1   • sildenafil (VIAGRA) 100 MG tablet TAKE ONE TABLET BY MOUTHDAILY AS NEEDED FOR ERECTILE DYSFUNCTION 10 tablet 0   • amLODIPine (NORVASC) 10 MG tablet Take 1 tablet by mouth Daily. 90 tablet 3     No current facility-administered medications for this visit.        Diagnoses and all orders for this visit:    1. Type 2 diabetes mellitus with hyperglycemia, with long-term current use of insulin (HCC) (Primary)  -     POC Glycosylated Hemoglobin (Hb A1C)  Well-controlled, continue current medication.  2. Primary hypertension  Dizziness and fatigue with HCTZ.  Increase amlodipine to 10 mg daily.  Other orders  -     amLODIPine (NORVASC) 10 MG tablet; Take 1 tablet by mouth Daily.  Dispense: 90 tablet; Refill: 3         Return in about 3 months (around 8/31/2022) for a1c.     Dictated Utilizing Dragon Dictation    Please note that portions of this note were completed with a voice recognition program.    Part of this note may be an electronic transcription/translation of spoken language to printed text using the Dragon Dictation System.  "

## 2022-06-07 DIAGNOSIS — Z79.4 TYPE 2 DIABETES MELLITUS WITH HYPERGLYCEMIA, WITH LONG-TERM CURRENT USE OF INSULIN: ICD-10-CM

## 2022-06-07 DIAGNOSIS — E11.65 TYPE 2 DIABETES MELLITUS WITH HYPERGLYCEMIA, WITH LONG-TERM CURRENT USE OF INSULIN: ICD-10-CM

## 2022-06-07 NOTE — TELEPHONE ENCOUNTER
Rx Refill Note  Requested Prescriptions     Pending Prescriptions Disp Refills   • metFORMIN (GLUCOPHAGE) 1000 MG tablet 90 tablet 3     Sig: Take 1 tablet by mouth 2 (Two) Times a Day With Meals.      Last office visit with prescribing clinician: 5/31/2022      Next office visit with prescribing clinician: 8/31/2022            Linette Perez MA  06/07/22, 10:46 EDT

## 2022-06-07 NOTE — TELEPHONE ENCOUNTER
Incoming Refill Request      Medication requested (name and dose): metFORMIN (GLUCOPHAGE) 1000 MG tablet    Pharmacy where request should be sent: [facility]    Additional details provided by patient: PATIENT HAS THREE DAYS LEFT    Best call back number: [phone]    Does the patient have less than a 3 day supply:  [] Yes  [x] No    Tami Roldan Rep  06/07/22, 10:38 EDT

## 2022-06-27 ENCOUNTER — HOSPITAL ENCOUNTER (OUTPATIENT)
Dept: ONCOLOGY | Facility: HOSPITAL | Age: 67
Setting detail: INFUSION SERIES
Discharge: HOME | End: 2022-06-27

## 2022-06-27 ENCOUNTER — LAB (OUTPATIENT)
Dept: LAB | Facility: HOSPITAL | Age: 67
End: 2022-06-27

## 2022-06-27 ENCOUNTER — OFFICE VISIT (OUTPATIENT)
Dept: ONCOLOGY | Facility: CLINIC | Age: 67
End: 2022-06-27

## 2022-06-27 VITALS
SYSTOLIC BLOOD PRESSURE: 173 MMHG | HEIGHT: 67 IN | TEMPERATURE: 97.5 F | RESPIRATION RATE: 18 BRPM | DIASTOLIC BLOOD PRESSURE: 80 MMHG | HEART RATE: 87 BPM | WEIGHT: 188 LBS | BODY MASS INDEX: 29.51 KG/M2 | OXYGEN SATURATION: 93 %

## 2022-06-27 VITALS — DIASTOLIC BLOOD PRESSURE: 75 MMHG | HEART RATE: 87 BPM | SYSTOLIC BLOOD PRESSURE: 166 MMHG

## 2022-06-27 DIAGNOSIS — D75.1 POLYCYTHEMIA: Primary | ICD-10-CM

## 2022-06-27 DIAGNOSIS — D75.1 POLYCYTHEMIA: ICD-10-CM

## 2022-06-27 LAB
ERYTHROCYTE [DISTWIDTH] IN BLOOD BY AUTOMATED COUNT: 20.2 % (ref 12.3–15.4)
FERRITIN SERPL-MCNC: 39.73 NG/ML (ref 30–400)
HCT VFR BLD AUTO: 55.9 % (ref 37.5–51)
HGB BLD-MCNC: 17.3 G/DL (ref 13–17.7)
LYMPHOCYTES # BLD AUTO: 3 10*3/MM3 (ref 0.7–3.1)
LYMPHOCYTES NFR BLD AUTO: 41.8 % (ref 19.6–45.3)
MCH RBC QN AUTO: 25.3 PG (ref 26.6–33)
MCHC RBC AUTO-ENTMCNC: 30.9 G/DL (ref 31.5–35.7)
MCV RBC AUTO: 81.9 FL (ref 79–97)
MONOCYTES # BLD AUTO: 0.4 10*3/MM3 (ref 0.1–0.9)
MONOCYTES NFR BLD AUTO: 5.9 % (ref 5–12)
NEUTROPHILS NFR BLD AUTO: 3.7 10*3/MM3 (ref 1.7–7)
NEUTROPHILS NFR BLD AUTO: 52.3 % (ref 42.7–76)
PLATELET # BLD AUTO: 443 10*3/MM3 (ref 140–450)
PMV BLD AUTO: 7.3 FL (ref 6–12)
RBC # BLD AUTO: 6.82 10*6/MM3 (ref 4.14–5.8)
WBC NRBC COR # BLD: 7.1 10*3/MM3 (ref 3.4–10.8)

## 2022-06-27 PROCEDURE — 99214 OFFICE O/P EST MOD 30 MIN: CPT | Performed by: INTERNAL MEDICINE

## 2022-06-27 PROCEDURE — 36415 COLL VENOUS BLD VENIPUNCTURE: CPT

## 2022-06-27 PROCEDURE — 82728 ASSAY OF FERRITIN: CPT

## 2022-06-27 PROCEDURE — 85025 COMPLETE CBC W/AUTO DIFF WBC: CPT

## 2022-06-27 PROCEDURE — 99195 PHLEBOTOMY: CPT

## 2022-06-27 RX ORDER — HYDROXYUREA 500 MG/1
500 CAPSULE ORAL DAILY
Qty: 30 CAPSULE | Refills: 2 | Status: SHIPPED | OUTPATIENT
Start: 2022-06-27

## 2022-06-27 NOTE — PROGRESS NOTES
DATE OF VISIT: 6/27/2022    REASON FOR VISIT: Followup for polycythemia vera     PROBLEM LIST:  1. Presented with asymptomatic elevated hemoglobin hematocrit and platelets August 2020  B.  JAK2 V6 17F mutation  2.  Thrombocytosis  3.  Type 2 diabetes  4.  Hypertension  5.  Hypercholesterolemia      HISTORY OF PRESENT ILLNESS: The patient is a very pleasant 66 y.o. male  with past medical history significant for polycythemia vera diagnosed August 2020. The  patient is here today for scheduled follow-up visit.    SUBJECTIVE: The patient is here today by himself.  He is complaining of mild fatigue otherwise has been doing about the same.    Past History:  Medical History: has a past medical history of CAD (coronary artery disease), Diabetes mellitus (HCC), Heart attack (HCC), Hyperlipidemia, and Hypertension.   Surgical History: has a past surgical history that includes Appendectomy and Coronary artery bypass graft (10/29/2018).   Family History: family history includes Aneurysm in his mother; Cancer in his sister; Diabetes in his brother, father, maternal grandfather, maternal grandmother, mother, paternal grandfather, paternal grandmother, sister, sister, and sister; Heart attack in his father; Heart disease in his brother; Hypertension in his brother, brother, father, maternal grandfather, maternal grandmother, paternal grandfather, and paternal grandmother; No Known Problems in his brother.   Social History: reports that he has quit smoking. His smoking use included cigarettes. He has a 3.75 pack-year smoking history. He has never used smokeless tobacco. He reports that he does not drink alcohol and does not use drugs.    (Not in a hospital admission)     Allergies: Hydrochlorothiazide, Crestor [rosuvastatin calcium], Lipitor [atorvastatin calcium], and Penicillins     Review of Systems   Constitutional: Positive for fatigue.   Respiratory: Negative.    Cardiovascular: Negative.    Gastrointestinal: Negative.   "      PHYSICAL EXAMINATION:   /80   Pulse 87   Temp 97.5 °F (36.4 °C) (Temporal)   Resp 18   Ht 170.2 cm (67.01\")   Wt 85.3 kg (188 lb)   SpO2 93%   BMI 29.44 kg/m²    Pain Score    06/27/22 0948   PainSc: 0-No pain        ECOG score: 0            ECOG Performance Status: 1 - Symptomatic but completely ambulatory      General Appearance:      alert, cooperative, no apparent distress and appears stated age   Lungs:   Clear to auscultation bilaterally; respirations regular, even, and unlabored bilaterally   Heart:  Regular rate and rhythm, no murmurs appreciated   Abdomen:   Soft, non-tender, non-distended and no organomegaly                 Lab on 06/27/2022   Component Date Value Ref Range Status   • WBC 06/27/2022 7.10  3.40 - 10.80 10*3/mm3 Final   • RBC 06/27/2022 6.82 (A) 4.14 - 5.80 10*6/mm3 Final   • Hemoglobin 06/27/2022 17.3  13.0 - 17.7 g/dL Final   • Hematocrit 06/27/2022 55.9 (A) 37.5 - 51.0 % Final   • RDW 06/27/2022 20.2 (A) 12.3 - 15.4 % Final   • MCV 06/27/2022 81.9  79.0 - 97.0 fL Final   • MCH 06/27/2022 25.3 (A) 26.6 - 33.0 pg Final   • MCHC 06/27/2022 30.9 (A) 31.5 - 35.7 g/dL Final   • MPV 06/27/2022 7.3  6.0 - 12.0 fL Final   • Platelets 06/27/2022 443  140 - 450 10*3/mm3 Final   • Neutrophil % 06/27/2022 52.3  42.7 - 76.0 % Final   • Lymphocyte % 06/27/2022 41.8  19.6 - 45.3 % Final   • Monocyte % 06/27/2022 5.9  5.0 - 12.0 % Final   • Neutrophils, Absolute 06/27/2022 3.70  1.70 - 7.00 10*3/mm3 Final   • Lymphocytes, Absolute 06/27/2022 3.00  0.70 - 3.10 10*3/mm3 Final   • Monocytes, Absolute 06/27/2022 0.40  0.10 - 0.90 10*3/mm3 Final        No results found.    ASSESSMENT: The patient is a very pleasant 66 y.o. male  with polycythemia vera      PLAN:    1.  Primary polycythemia:  A.  I will start the patient on Hydrea 500 mg daily.  B.  I will continue phlebotomy as needed for hematocrit more than 45.  C.  He will be treated phlebotomy today and may be on return.  D.  He will " follow-up with me in 4 weeks with repeat CBC.  E.  I discussed with the patient CBC result from today.  I explained to him that hematocrit has increased to 56.  F.  We will continue aspirin 81 mg daily.    2.  Hypertension:  A.  I will continue Norvasc, Coreg and Cozaar daily  B.  We will watch his blood pressure since it might drop with phlebotomy.    3.  Type 2 diabetes:  A.  The patient continue metformin and insulin    4.  Hypercholesteremia:  A.  The patient will continue Crestor 10 mg daily.    FOLLOW UP: 1 month with CBC and possible phlebotomy.    Annabelle Lea MD  6/27/2022

## 2022-07-14 NOTE — PROGRESS NOTES
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 male who resides in [place], [place].    Reason for visit:  CAD  CV risk factors including diabetes      Subjective      Major returns to the office today.  He is doing well and denies any cardiovascular complaints.  He states that they have been tinkering with his blood pressure medicines.  He has not had any recent lab work performed.    Review of Systems   Cardiovascular: Negative.    Respiratory: Negative.        Objective     /74 (BP Location: Left arm, Patient Position: Sitting, Cuff Size: Adult)   Pulse 87   Ht 170.2 cm (67\")   Wt 86.2 kg (190 lb)   SpO2 95%   BMI 29.76 kg/m²       Physical Exam    Result Review :            Assessment     Problem List Items Addressed This Visit        Cardiology Problems    Coronary artery disease involving native coronary artery of native heart with angina pectoris (HCC) - Primary (Chronic)    Overview     · NSTEMI in 2002 with drug-eluting stent to the LAD  · NSTEMI 2004 with drug-eluting stent to the left circumflex.  · Echocardiogram (10/26/2018): LVEF normal. Mild LVH. Mild MR.   · Cardiac catheterization for NSTEMI  (10/26/2018): severe 3-vessel CAD.  LVEF normal.  · CABG (10/29/2018): LIMA to LAD, SVG to RCA, sequential SVG to OM and LPL           Current Assessment & Plan     · No anginal symptoms  · Change carvedilol to metoprolol succinate for once daily dosing  · Continue aspirin and clopidogrel           Relevant Medications    amLODIPine (NORVASC) 10 MG tablet    metoprolol succinate XL (TOPROL-XL) 100 MG 24 hr tablet    Essential hypertension (Chronic)    Overview     • Target blood pressure <130/80 mmHg           Current Assessment & Plan     · Elevated today's visit  · Continue losartan, amlodipine           Relevant Medications    losartan (COZAAR) 100 MG tablet    amLODIPine (NORVASC) 10 MG tablet    metoprolol succinate XL (TOPROL-XL) 100 MG 24 hr tablet    " Hyperlipidemia LDL goal <70 (Chronic)    Overview     · High intensity statin therapy indicated due to CAD  · Historically intolerant to rosuvastatin and atorvastatin           Current Assessment & Plan     · Obtain CMP and lipids           Relevant Orders    LDL Cholesterol, Direct    Comprehensive Metabolic Panel    Lipid Panel       Other    Type 2 diabetes mellitus with hyperglycemia, with long-term current use of insulin (HCC)    Current Assessment & Plan     · Obtain hemoglobin A1c  · Consider empagliflozin for diabetes and CV risk reduction           Relevant Orders    Hemoglobin A1c          Plan   • Obtain CMP, lipids, hemoglobin A1c today  • Change carvedilol to metoprolol succinate 100 mg daily      Follow-up   Return in about 1 year (around 7/19/2023).        Roberto Faust MD, FACC, [facility]  7/19/2022

## 2022-07-19 ENCOUNTER — OFFICE VISIT (OUTPATIENT)
Dept: CARDIOLOGY | Facility: CLINIC | Age: 67
End: 2022-07-19

## 2022-07-19 ENCOUNTER — LAB (OUTPATIENT)
Dept: LAB | Facility: HOSPITAL | Age: 67
End: 2022-07-19

## 2022-07-19 VITALS
SYSTOLIC BLOOD PRESSURE: 156 MMHG | DIASTOLIC BLOOD PRESSURE: 74 MMHG | OXYGEN SATURATION: 95 % | WEIGHT: 190 LBS | HEIGHT: 67 IN | BODY MASS INDEX: 29.82 KG/M2 | HEART RATE: 87 BPM

## 2022-07-19 DIAGNOSIS — E11.65 TYPE 2 DIABETES MELLITUS WITH HYPERGLYCEMIA, WITH LONG-TERM CURRENT USE OF INSULIN: ICD-10-CM

## 2022-07-19 DIAGNOSIS — E78.5 HYPERLIPIDEMIA LDL GOAL <70: Chronic | ICD-10-CM

## 2022-07-19 DIAGNOSIS — Z79.4 TYPE 2 DIABETES MELLITUS WITH HYPERGLYCEMIA, WITH LONG-TERM CURRENT USE OF INSULIN: ICD-10-CM

## 2022-07-19 DIAGNOSIS — I25.119 CORONARY ARTERY DISEASE INVOLVING NATIVE CORONARY ARTERY OF NATIVE HEART WITH ANGINA PECTORIS: Primary | Chronic | ICD-10-CM

## 2022-07-19 DIAGNOSIS — I10 ESSENTIAL HYPERTENSION: Chronic | ICD-10-CM

## 2022-07-19 LAB
ALBUMIN SERPL-MCNC: 4.4 G/DL (ref 3.5–5.2)
ALBUMIN/GLOB SERPL: 1.5 G/DL
ALP SERPL-CCNC: 61 U/L (ref 39–117)
ALT SERPL W P-5'-P-CCNC: 38 U/L (ref 1–41)
ANION GAP SERPL CALCULATED.3IONS-SCNC: 13.2 MMOL/L (ref 5–15)
AST SERPL-CCNC: 33 U/L (ref 1–40)
BILIRUB SERPL-MCNC: 0.4 MG/DL (ref 0–1.2)
BUN SERPL-MCNC: 12 MG/DL (ref 8–23)
BUN/CREAT SERPL: 13.5 (ref 7–25)
CALCIUM SPEC-SCNC: 9.3 MG/DL (ref 8.6–10.5)
CHLORIDE SERPL-SCNC: 104 MMOL/L (ref 98–107)
CHOLEST SERPL-MCNC: 192 MG/DL (ref 0–200)
CO2 SERPL-SCNC: 21.8 MMOL/L (ref 22–29)
CREAT SERPL-MCNC: 0.89 MG/DL (ref 0.76–1.27)
EGFRCR SERPLBLD CKD-EPI 2021: 95.1 ML/MIN/1.73
GLOBULIN UR ELPH-MCNC: 3 GM/DL
GLUCOSE SERPL-MCNC: 71 MG/DL (ref 65–99)
HBA1C MFR BLD: 6.2 % (ref 4.8–5.6)
HDLC SERPL-MCNC: 42 MG/DL (ref 40–60)
LDLC SERPL CALC-MCNC: 137 MG/DL (ref 0–100)
LDLC/HDLC SERPL: 3.23 {RATIO}
POTASSIUM SERPL-SCNC: 3.8 MMOL/L (ref 3.5–5.2)
PROT SERPL-MCNC: 7.4 G/DL (ref 6–8.5)
SODIUM SERPL-SCNC: 139 MMOL/L (ref 136–145)
TRIGL SERPL-MCNC: 71 MG/DL (ref 0–150)
VLDLC SERPL-MCNC: 13 MG/DL (ref 5–40)

## 2022-07-19 PROCEDURE — 36415 COLL VENOUS BLD VENIPUNCTURE: CPT

## 2022-07-19 PROCEDURE — 83036 HEMOGLOBIN GLYCOSYLATED A1C: CPT

## 2022-07-19 PROCEDURE — 80053 COMPREHEN METABOLIC PANEL: CPT

## 2022-07-19 PROCEDURE — 80061 LIPID PANEL: CPT

## 2022-07-19 PROCEDURE — 99214 OFFICE O/P EST MOD 30 MIN: CPT | Performed by: INTERNAL MEDICINE

## 2022-07-19 RX ORDER — METOPROLOL SUCCINATE 100 MG/1
100 TABLET, EXTENDED RELEASE ORAL DAILY
Qty: 90 TABLET | Refills: 3 | Status: SHIPPED | OUTPATIENT
Start: 2022-07-19

## 2022-07-19 RX ORDER — LOSARTAN POTASSIUM 100 MG/1
1 TABLET ORAL DAILY
COMMUNITY
Start: 2022-07-12 | End: 2022-07-19 | Stop reason: SDUPTHER

## 2022-07-19 RX ORDER — LOSARTAN POTASSIUM 100 MG/1
100 TABLET ORAL DAILY
Qty: 90 TABLET | Refills: 3 | Status: SHIPPED | OUTPATIENT
Start: 2022-07-19

## 2022-07-19 RX ORDER — AMLODIPINE BESYLATE 10 MG/1
10 TABLET ORAL DAILY
Qty: 90 TABLET | Refills: 3 | Status: SHIPPED | OUTPATIENT
Start: 2022-07-19

## 2022-07-19 NOTE — ASSESSMENT & PLAN NOTE
· No anginal symptoms  · Change carvedilol to metoprolol succinate for once daily dosing  · Continue aspirin and clopidogrel

## 2022-07-26 ENCOUNTER — TELEPHONE (OUTPATIENT)
Dept: CARDIOLOGY | Facility: CLINIC | Age: 67
End: 2022-07-26

## 2022-07-26 NOTE — TELEPHONE ENCOUNTER
----- Message from Paolo Faust IV, MD sent at 7/26/2022 11:36 AM EDT -----  Repatha with repeat labs in 1 month

## 2022-08-02 ENCOUNTER — HOSPITAL ENCOUNTER (OUTPATIENT)
Dept: ONCOLOGY | Facility: HOSPITAL | Age: 67
Setting detail: INFUSION SERIES
Discharge: HOME | End: 2022-08-02

## 2022-08-02 ENCOUNTER — OFFICE VISIT (OUTPATIENT)
Dept: ONCOLOGY | Facility: CLINIC | Age: 67
End: 2022-08-02

## 2022-08-02 VITALS
HEART RATE: 88 BPM | TEMPERATURE: 97.3 F | SYSTOLIC BLOOD PRESSURE: 177 MMHG | WEIGHT: 188 LBS | BODY MASS INDEX: 29.51 KG/M2 | OXYGEN SATURATION: 96 % | DIASTOLIC BLOOD PRESSURE: 83 MMHG | RESPIRATION RATE: 18 BRPM | HEIGHT: 67 IN

## 2022-08-02 DIAGNOSIS — D75.1 POLYCYTHEMIA: Primary | ICD-10-CM

## 2022-08-02 LAB
ERYTHROCYTE [DISTWIDTH] IN BLOOD BY AUTOMATED COUNT: 19.6 % (ref 12.3–15.4)
FERRITIN SERPL-MCNC: 30.4 NG/ML (ref 30–400)
HCT VFR BLD AUTO: 52.8 % (ref 37.5–51)
HGB BLD-MCNC: 16 G/DL (ref 13–17.7)
LYMPHOCYTES # BLD AUTO: 2.7 10*3/MM3 (ref 0.7–3.1)
LYMPHOCYTES NFR BLD AUTO: 40.6 % (ref 19.6–45.3)
MCH RBC QN AUTO: 25.5 PG (ref 26.6–33)
MCHC RBC AUTO-ENTMCNC: 30.4 G/DL (ref 31.5–35.7)
MCV RBC AUTO: 84 FL (ref 79–97)
MONOCYTES # BLD AUTO: 0.5 10*3/MM3 (ref 0.1–0.9)
MONOCYTES NFR BLD AUTO: 7.3 % (ref 5–12)
NEUTROPHILS NFR BLD AUTO: 3.4 10*3/MM3 (ref 1.7–7)
NEUTROPHILS NFR BLD AUTO: 52.1 % (ref 42.7–76)
PLATELET # BLD AUTO: 454 10*3/MM3 (ref 140–450)
PMV BLD AUTO: 7 FL (ref 6–12)
RBC # BLD AUTO: 6.28 10*6/MM3 (ref 4.14–5.8)
WBC NRBC COR # BLD: 6.6 10*3/MM3 (ref 3.4–10.8)

## 2022-08-02 PROCEDURE — 99195 PHLEBOTOMY: CPT

## 2022-08-02 PROCEDURE — 99214 OFFICE O/P EST MOD 30 MIN: CPT | Performed by: INTERNAL MEDICINE

## 2022-08-02 PROCEDURE — 36415 COLL VENOUS BLD VENIPUNCTURE: CPT

## 2022-08-02 PROCEDURE — 82728 ASSAY OF FERRITIN: CPT | Performed by: INTERNAL MEDICINE

## 2022-08-02 PROCEDURE — 85025 COMPLETE CBC W/AUTO DIFF WBC: CPT | Performed by: INTERNAL MEDICINE

## 2022-08-29 ENCOUNTER — TELEPHONE (OUTPATIENT)
Dept: ONCOLOGY | Facility: CLINIC | Age: 67
End: 2022-08-29

## 2022-08-29 NOTE — TELEPHONE ENCOUNTER
Spoke with patient and he is working out his insurance. He is now rescheduled for 9/16/22 at 11:00am. He will call back if he needs to change that date.

## 2022-08-29 NOTE — TELEPHONE ENCOUNTER
Caller: Gal Pierce    Relationship to patient: Self    Best call back number: [phone]    Chief complaint: PATIENT CALLING TO RESCHEDULE 8/30/22 APPT    Type of visit: FU AND INFUSION    Requested date: AFTER 9/15/22

## 2022-08-30 ENCOUNTER — APPOINTMENT (OUTPATIENT)
Dept: ONCOLOGY | Facility: HOSPITAL | Age: 67
End: 2022-08-30

## 2022-09-26 ENCOUNTER — TELEPHONE (OUTPATIENT)
Dept: FAMILY MEDICINE CLINIC | Facility: CLINIC | Age: 67
End: 2022-09-26

## 2022-09-26 NOTE — TELEPHONE ENCOUNTER
"  Caller: Gal Pierce    Relationship: Self    Best call back number: [phone]    What medications are you currently taking:   Current Outpatient Medications on File Prior to Visit   Medication Sig Dispense Refill   • amLODIPine (NORVASC) 10 MG tablet Take 1 tablet by mouth Daily. 90 tablet 3   • aspirin 81 MG EC tablet Take 1 tablet by mouth Daily. 90 tablet 3   • clopidogrel (PLAVIX) 75 MG tablet Take 1 tablet by mouth Daily. (Patient taking differently: Take 75 mg by mouth Every Other Day.) 90 tablet 3   • ezetimibe (ZETIA) 10 MG tablet Take 1 tablet by mouth Daily. 90 tablet 3   • hydroxyurea (Hydrea) 500 MG capsule Take 1 capsule by mouth Daily. 30 capsule 2   • Insulin Glargine (BASAGLAR KWIKPEN) 100 UNIT/ML injection pen Inject 100 Units under the skin into the appropriate area as directed Daily. 10 pen 11   • Insulin Pen Needle (Pen Needles 5/16\") 31G X 8 MM misc 1 each Daily. 30 each 5   • losartan (COZAAR) 100 MG tablet Take 1 tablet by mouth Daily. 90 tablet 3   • metFORMIN (GLUCOPHAGE) 1000 MG tablet Take 1 tablet by mouth 2 (Two) Times a Day With Meals. 90 tablet 3   • metoprolol succinate XL (TOPROL-XL) 100 MG 24 hr tablet Take 1 tablet by mouth Daily. 90 tablet 3   • Multiple Vitamins-Minerals (CENTRUM SILVER PO) Take  by mouth Daily.     • rosuvastatin (CRESTOR) 10 MG tablet Take 1 tablet by mouth Every Night. 90 tablet 1   • sildenafil (VIAGRA) 100 MG tablet TAKE ONE TABLET BY MOUTHDAILY AS NEEDED FOR ERECTILE DYSFUNCTION 10 tablet 0     No current facility-administered medications on file prior to visit.              Which medication are you concerned about: Insulin Glargine (BASAGLAR KWIKPEN) 100 UNIT/ML injection pen    Who prescribed you this medication: DR VALENCIA    What are your concerns: PATIENT DOES HAVE A 90 DAY SUPPLY HOWEVER INSURANCE ADVISED THEY WOULDN'T PAY FOR THIS WITHOUT AN AUTHORIZATION FROM DR VALENCIA; PLEASE CALL TO ADVISE ON HIS OPTIONS; HE ADVISED THAT HIS PRESCRIPTION " COVERAGE THOUGH IS THROUGH [employer]--THEY ARE THE ONES WHO WONT COVER THIS MEDICATION UNLESS THEY GET A PA    PLEASE CALL TO ADVISE

## 2022-09-26 NOTE — TELEPHONE ENCOUNTER
Contacted patient to inquire further, he has switched to [employer] and has not received a new insurance card yet. He agreed to provide this new info the pharmacy and to our office     He currently has a 90 day supply of Basaglar and will notify us once he has updated info

## 2022-10-05 ENCOUNTER — OFFICE VISIT (OUTPATIENT)
Dept: FAMILY MEDICINE CLINIC | Facility: CLINIC | Age: 67
End: 2022-10-05

## 2022-10-05 VITALS
HEART RATE: 97 BPM | HEIGHT: 67 IN | WEIGHT: 190 LBS | DIASTOLIC BLOOD PRESSURE: 78 MMHG | OXYGEN SATURATION: 94 % | BODY MASS INDEX: 29.82 KG/M2 | SYSTOLIC BLOOD PRESSURE: 138 MMHG

## 2022-10-05 DIAGNOSIS — E78.5 HYPERLIPIDEMIA LDL GOAL <70: ICD-10-CM

## 2022-10-05 DIAGNOSIS — Z23 IMMUNIZATION DUE: ICD-10-CM

## 2022-10-05 DIAGNOSIS — I10 PRIMARY HYPERTENSION: ICD-10-CM

## 2022-10-05 DIAGNOSIS — E55.9 VITAMIN D DEFICIENCY: ICD-10-CM

## 2022-10-05 DIAGNOSIS — Z12.5 ENCOUNTER FOR PROSTATE CANCER SCREENING: ICD-10-CM

## 2022-10-05 DIAGNOSIS — Z00.00 MEDICARE ANNUAL WELLNESS VISIT, SUBSEQUENT: Primary | ICD-10-CM

## 2022-10-05 DIAGNOSIS — E11.65 TYPE 2 DIABETES MELLITUS WITH HYPERGLYCEMIA, WITH LONG-TERM CURRENT USE OF INSULIN: ICD-10-CM

## 2022-10-05 DIAGNOSIS — Z79.4 TYPE 2 DIABETES MELLITUS WITH HYPERGLYCEMIA, WITH LONG-TERM CURRENT USE OF INSULIN: ICD-10-CM

## 2022-10-05 PROCEDURE — G0008 ADMIN INFLUENZA VIRUS VAC: HCPCS | Performed by: INTERNAL MEDICINE

## 2022-10-05 PROCEDURE — G0403 EKG FOR INITIAL PREVENT EXAM: HCPCS | Performed by: INTERNAL MEDICINE

## 2022-10-05 PROCEDURE — 1170F FXNL STATUS ASSESSED: CPT | Performed by: INTERNAL MEDICINE

## 2022-10-05 PROCEDURE — 1159F MED LIST DOCD IN RCRD: CPT | Performed by: INTERNAL MEDICINE

## 2022-10-05 PROCEDURE — 99397 PER PM REEVAL EST PAT 65+ YR: CPT | Performed by: INTERNAL MEDICINE

## 2022-10-05 PROCEDURE — G0402 INITIAL PREVENTIVE EXAM: HCPCS | Performed by: INTERNAL MEDICINE

## 2022-10-05 PROCEDURE — 96160 PT-FOCUSED HLTH RISK ASSMT: CPT | Performed by: INTERNAL MEDICINE

## 2022-10-05 PROCEDURE — 90662 IIV NO PRSV INCREASED AG IM: CPT | Performed by: INTERNAL MEDICINE

## 2022-10-17 ENCOUNTER — LAB (OUTPATIENT)
Dept: LAB | Facility: HOSPITAL | Age: 67
End: 2022-10-17

## 2022-10-17 DIAGNOSIS — E78.5 HYPERLIPIDEMIA LDL GOAL <70: ICD-10-CM

## 2022-10-17 DIAGNOSIS — E55.9 VITAMIN D DEFICIENCY: ICD-10-CM

## 2022-10-17 DIAGNOSIS — I10 PRIMARY HYPERTENSION: ICD-10-CM

## 2022-10-17 DIAGNOSIS — Z79.4 TYPE 2 DIABETES MELLITUS WITH HYPERGLYCEMIA, WITH LONG-TERM CURRENT USE OF INSULIN: ICD-10-CM

## 2022-10-17 DIAGNOSIS — E11.65 TYPE 2 DIABETES MELLITUS WITH HYPERGLYCEMIA, WITH LONG-TERM CURRENT USE OF INSULIN: ICD-10-CM

## 2022-10-17 DIAGNOSIS — D75.1 POLYCYTHEMIA: ICD-10-CM

## 2022-10-17 DIAGNOSIS — Z12.5 ENCOUNTER FOR PROSTATE CANCER SCREENING: ICD-10-CM

## 2022-10-17 LAB
25(OH)D3+25(OH)D2 SERPL-MCNC: 27.3 NG/ML (ref 30–100)
ALBUMIN SERPL-MCNC: 4.8 G/DL (ref 3.5–5.2)
ALBUMIN/GLOB SERPL: 1.8 G/DL
ALP SERPL-CCNC: 64 U/L (ref 39–117)
ALT SERPL-CCNC: 38 U/L (ref 1–41)
AST SERPL-CCNC: 30 U/L (ref 1–40)
BASOPHILS # BLD AUTO: 0.08 10*3/MM3 (ref 0–0.2)
BASOPHILS NFR BLD AUTO: 1.1 % (ref 0–1.5)
BILIRUB SERPL-MCNC: 0.3 MG/DL (ref 0–1.2)
BUN SERPL-MCNC: 12 MG/DL (ref 8–23)
BUN/CREAT SERPL: 14.1 (ref 7–25)
CALCIUM SERPL-MCNC: 9.9 MG/DL (ref 8.6–10.5)
CHLORIDE SERPL-SCNC: 102 MMOL/L (ref 98–107)
CHOLEST SERPL-MCNC: 175 MG/DL (ref 0–200)
CO2 SERPL-SCNC: 26.7 MMOL/L (ref 22–29)
CREAT SERPL-MCNC: 0.85 MG/DL (ref 0.76–1.27)
EGFRCR SERPLBLD CKD-EPI 2021: 96.4 ML/MIN/1.73
EOSINOPHIL # BLD AUTO: 0.32 10*3/MM3 (ref 0–0.4)
EOSINOPHIL NFR BLD AUTO: 4.4 % (ref 0.3–6.2)
ERYTHROCYTE [DISTWIDTH] IN BLOOD BY AUTOMATED COUNT: 16.6 % (ref 12.3–15.4)
FERRITIN SERPL-MCNC: 33.31 NG/ML (ref 30–400)
GLOBULIN SER CALC-MCNC: 2.6 GM/DL
GLUCOSE SERPL-MCNC: 72 MG/DL (ref 65–99)
HBA1C MFR BLD: 6.7 % (ref 4.8–5.6)
HCT VFR BLD AUTO: 51 % (ref 37.5–51)
HCT VFR BLD AUTO: 53.2 % (ref 37.5–51)
HDLC SERPL-MCNC: 46 MG/DL (ref 40–60)
HGB BLD-MCNC: 16.6 G/DL (ref 13–17.7)
IMM GRANULOCYTES # BLD AUTO: 0.04 10*3/MM3 (ref 0–0.05)
IMM GRANULOCYTES NFR BLD AUTO: 0.6 % (ref 0–0.5)
LDLC SERPL CALC-MCNC: 114 MG/DL (ref 0–100)
LYMPHOCYTES # BLD AUTO: 2.83 10*3/MM3 (ref 0.7–3.1)
LYMPHOCYTES NFR BLD AUTO: 39 % (ref 19.6–45.3)
MCH RBC QN AUTO: 27.6 PG (ref 26.6–33)
MCHC RBC AUTO-ENTMCNC: 32.5 G/DL (ref 31.5–35.7)
MCV RBC AUTO: 84.7 FL (ref 79–97)
MONOCYTES # BLD AUTO: 0.62 10*3/MM3 (ref 0.1–0.9)
MONOCYTES NFR BLD AUTO: 8.5 % (ref 5–12)
NEUTROPHILS # BLD AUTO: 3.37 10*3/MM3 (ref 1.7–7)
NEUTROPHILS NFR BLD AUTO: 46.4 % (ref 42.7–76)
NRBC BLD AUTO-RTO: 0 /100 WBC (ref 0–0.2)
PLATELET # BLD AUTO: 456 10*3/MM3 (ref 140–450)
POTASSIUM SERPL-SCNC: 4.8 MMOL/L (ref 3.5–5.2)
PROT SERPL-MCNC: 7.4 G/DL (ref 6–8.5)
PSA SERPL-MCNC: 4.22 NG/ML (ref 0–4)
RBC # BLD AUTO: 6.02 10*6/MM3 (ref 4.14–5.8)
SODIUM SERPL-SCNC: 142 MMOL/L (ref 136–145)
T4 FREE SERPL-MCNC: 1.22 NG/DL (ref 0.93–1.7)
TRIGL SERPL-MCNC: 79 MG/DL (ref 0–150)
TSH SERPL DL<=0.005 MIU/L-ACNC: 0.81 UIU/ML (ref 0.27–4.2)
VLDLC SERPL CALC-MCNC: 15 MG/DL (ref 5–40)
WBC # BLD AUTO: 7.26 10*3/MM3 (ref 3.4–10.8)

## 2022-10-17 PROCEDURE — 85014 HEMATOCRIT: CPT

## 2022-10-17 PROCEDURE — 82728 ASSAY OF FERRITIN: CPT

## 2022-10-17 PROCEDURE — 36415 COLL VENOUS BLD VENIPUNCTURE: CPT

## 2022-10-20 DIAGNOSIS — R97.20 ELEVATED PSA: Primary | ICD-10-CM

## 2022-11-12 DIAGNOSIS — I25.119 CORONARY ARTERY DISEASE INVOLVING NATIVE CORONARY ARTERY OF NATIVE HEART WITH ANGINA PECTORIS: Chronic | ICD-10-CM

## 2022-11-14 RX ORDER — CLOPIDOGREL BISULFATE 75 MG/1
TABLET ORAL
Qty: 90 TABLET | Refills: 3 | Status: SHIPPED | OUTPATIENT
Start: 2022-11-14

## 2022-11-28 ENCOUNTER — OFFICE VISIT (OUTPATIENT)
Dept: FAMILY MEDICINE CLINIC | Facility: CLINIC | Age: 67
End: 2022-11-28

## 2022-11-28 VITALS
OXYGEN SATURATION: 96 % | DIASTOLIC BLOOD PRESSURE: 84 MMHG | SYSTOLIC BLOOD PRESSURE: 132 MMHG | HEIGHT: 67 IN | BODY MASS INDEX: 30.39 KG/M2 | HEART RATE: 90 BPM | WEIGHT: 193.6 LBS

## 2022-11-28 DIAGNOSIS — R05.1 ACUTE COUGH: Primary | ICD-10-CM

## 2022-11-28 DIAGNOSIS — J06.9 UPPER RESPIRATORY VIRUS: ICD-10-CM

## 2022-11-28 LAB
EXPIRATION DATE: NORMAL
FLUAV AG UPPER RESP QL IA.RAPID: NOT DETECTED
FLUBV AG UPPER RESP QL IA.RAPID: NOT DETECTED
INTERNAL CONTROL: NORMAL
Lab: NORMAL
SARS-COV-2 AG UPPER RESP QL IA.RAPID: NOT DETECTED

## 2022-11-28 PROCEDURE — 87428 SARSCOV & INF VIR A&B AG IA: CPT | Performed by: NURSE PRACTITIONER

## 2022-11-28 PROCEDURE — 99213 OFFICE O/P EST LOW 20 MIN: CPT | Performed by: NURSE PRACTITIONER

## 2022-11-28 RX ORDER — BENZONATATE 100 MG/1
100 CAPSULE ORAL 3 TIMES DAILY PRN
Qty: 30 CAPSULE | Refills: 0 | Status: SHIPPED | OUTPATIENT
Start: 2022-11-28

## 2022-11-28 RX ORDER — INSULIN GLARGINE 100 [IU]/ML
INJECTION, SOLUTION SUBCUTANEOUS
COMMUNITY
Start: 2022-11-28 | End: 2023-01-17 | Stop reason: ALTCHOICE

## 2022-11-28 NOTE — PROGRESS NOTES
"Chief Complaint  Cough (Pt states he has a post nasal drip and its causing a bad cough)    Subjective    {Problem List  Visit Diagnosis   Encounters  Notes  Medications  Labs  Result Review Imaging  Media :23}    Gal Pierce presents to [facility]  History of Present Illness  Pt is here today with concerns of post-nasal drip. He is experiencing cough due to the drainage. No shortness of breath. He has been taking Nyquil at night. He reports it does help relieve symptoms. He would like a z-kolton for symptoms. Discussed likelihood that this is a viral illness, but if symptoms worsen or are prolonged he should return for evaluation.   Cough  This is a new problem. The current episode started in the past 7 days. The problem has been unchanged. The cough is non-productive. Associated symptoms include postnasal drip. Nothing aggravates the symptoms. He has tried OTC cough suppressant for the symptoms.     Objective   Vital Signs:  /84   Pulse 90   Ht 170.2 cm (67.01\")   Wt 87.8 kg (193 lb 9.6 oz)   SpO2 96%   BMI 30.31 kg/m²   Estimated body mass index is 30.31 kg/m² as calculated from the following:    Height as of this encounter: 170.2 cm (67.01\").    Weight as of this encounter: 87.8 kg (193 lb 9.6 oz).        Physical Exam  Vitals reviewed.   Constitutional:       Appearance: Normal appearance.   HENT:      Right Ear: Tympanic membrane, ear canal and external ear normal.      Left Ear: Tympanic membrane, ear canal and external ear normal.      Nose: Rhinorrhea present. No congestion.      Mouth/Throat:      Mouth: Mucous membranes are moist.      Pharynx: Posterior oropharyngeal erythema present.   Eyes:      Conjunctiva/sclera: Conjunctivae normal.   Cardiovascular:      Rate and Rhythm: Normal rate and regular rhythm.      Heart sounds: Normal heart sounds.   Pulmonary:      Effort: Pulmonary effort is normal.      Breath sounds: Normal breath sounds. "   Musculoskeletal:         General: Normal range of motion.   Skin:     General: Skin is warm.   Neurological:      Mental Status: He is alert and oriented to person, place, and time.   Psychiatric:         Mood and Affect: Mood normal.         Behavior: Behavior normal.         Thought Content: Thought content normal.        Result Review :                Assessment and Plan   Diagnoses and all orders for this visit:    1. Acute cough (Primary)  -     POCT SARS-CoV-2 Antigen TIERNEY  -     benzonatate (Tessalon Perles) 100 MG capsule; Take 1 capsule by mouth 3 (Three) Times a Day As Needed for Cough.  Dispense: 30 capsule; Refill: 0    2. Upper respiratory virus             Follow Up   No follow-ups on file.  Patient was given instructions and counseling regarding his condition or for health maintenance advice. Please see specific information pulled into the AVS if appropriate.

## 2023-01-10 DIAGNOSIS — E11.65 TYPE 2 DIABETES MELLITUS WITH HYPERGLYCEMIA, WITH LONG-TERM CURRENT USE OF INSULIN: ICD-10-CM

## 2023-01-10 DIAGNOSIS — Z79.4 TYPE 2 DIABETES MELLITUS WITH HYPERGLYCEMIA, WITH LONG-TERM CURRENT USE OF INSULIN: ICD-10-CM

## 2023-01-10 RX ORDER — INSULIN GLARGINE 100 [IU]/ML
100 INJECTION, SOLUTION SUBCUTANEOUS DAILY
Qty: 10 ML | Refills: 11 | Status: SHIPPED | OUTPATIENT
Start: 2023-01-10 | End: 2023-02-21

## 2023-01-10 NOTE — TELEPHONE ENCOUNTER
Rx Refill Note  Requested Prescriptions     Pending Prescriptions Disp Refills   • Insulin Glargine (BASAGLAR KWIKPEN) 100 UNIT/ML injection pen       Sig: Inject 100 Units under the skin into the appropriate area as directed Daily.      Last office visit with prescribing clinician: 11/28/2022   Next office visit with prescribing clinician: Visit date not found   Dara Narvaez MA  01/10/23, 10:09 EST     Last fill: 04/27/2022

## 2023-01-10 NOTE — TELEPHONE ENCOUNTER
Caller: Gal Pierce    Relationship: Self    Best call back number: [phone]    What is the best time to reach you: ANYTIME    Who are you requesting to speak with (clinical staff, provider,  specific staff member): CLINICAL STAFF    Do you know the name of the person who called: SELF    What was the call regarding: PATIENT STATES THAT HE WOULD LIKE A CALL TO SEE WHICH INSULIN HE IS SUPPOSED TO BE ON Insulin Glargine (BASAGLAR KWIKPEN) 100 UNIT/ML injection pen OR Lantus SoloStar 100 UNIT/ML injection pen.    Do you require a callback: YES

## 2023-01-11 NOTE — TELEPHONE ENCOUNTER
Patient is requesting a call back regarding the insulin. Patient states that the wrong insulin was filled and would like to speak with someone regarding his insulin request.

## 2023-01-17 ENCOUNTER — TELEPHONE (OUTPATIENT)
Dept: FAMILY MEDICINE CLINIC | Facility: CLINIC | Age: 68
End: 2023-01-17
Payer: MEDICARE

## 2023-01-17 ENCOUNTER — PRIOR AUTHORIZATION (OUTPATIENT)
Dept: FAMILY MEDICINE CLINIC | Facility: CLINIC | Age: 68
End: 2023-01-17
Payer: MEDICARE

## 2023-01-17 NOTE — TELEPHONE ENCOUNTER
D/c lantus from medication list. LVM for patient to call the office back.   Per Dr. Tovar patient should only be taking the basaglar as long acting insulin. Not the lantus.     Hub may relay message and document.

## 2023-01-17 NOTE — TELEPHONE ENCOUNTER
Pt called us back.    He received new insurance and his new insurance will not cover the basaglar kwikpen. His insurance will cover lantus.    Patient prefers to be on basaglar, but only if we can get his insurance to approve it    Pt just picked up basaglar kwikpen, but now it won't be covered.     fidelia  Phone: [phone] Fax: [phone]      Call pt can leave message  [phone]   Offered and patient declined

## 2023-01-17 NOTE — TELEPHONE ENCOUNTER
Caller: Gal Pierce    Relationship: Self    Best call back number: [phone]    What is the best time to reach you: ANY    Who are you requesting to speak with (clinical staff, provider,  specific staff member): DR. VALENCIA OR HIS NURSE    What was the call regarding: PATIENT CALLED REGARDING HIS INSULIN PRESCRIPTION. THE LONG ACTING INSULIN HE WAS ON, Insulin Glargine (BASAGLAR KWIKPEN) 100 UNIT/ML injection pen, WAS NO LONGER COVERED BY INSURANCE AND HE WAS HAVING TO SWITCH TO THE LANTIS. INSURANCE WILL COVER THIS. HE WOULD PREFER TO BE ON HIS ORIGINAL INSULIN IF IT CAN GET APPROVED.    Do you require a callback: PLEASE CALL ASAP TO CLARIFY IF THERE WAS APPROVAL AND WHAT HE SHOULD BE TAKING.     HUB RELAYED MESSAGE 01.17.23

## 2023-01-17 NOTE — TELEPHONE ENCOUNTER
Attempted to contact patient, no answer. Left voicemail for patient to call the office back (office # given).     Hub may relay message & doucment.    Umer Tovar DO [facility] [address] Clinical Pool 17 hours ago (3:47 PM)     Pt should be taking basaglar only for long acting insulin        We have not received any information from the pharmacy that this is not covered. Basaglar has been sent and if not covered the pharmacy will fax a prior authorization request and we will submit at that time.

## 2023-02-20 DIAGNOSIS — E11.65 TYPE 2 DIABETES MELLITUS WITH HYPERGLYCEMIA, WITH LONG-TERM CURRENT USE OF INSULIN: ICD-10-CM

## 2023-02-20 DIAGNOSIS — Z79.4 TYPE 2 DIABETES MELLITUS WITH HYPERGLYCEMIA, WITH LONG-TERM CURRENT USE OF INSULIN: ICD-10-CM

## 2023-02-21 RX ORDER — INSULIN GLARGINE 100 [IU]/ML
INJECTION, SOLUTION SUBCUTANEOUS
Qty: 30 ML | Refills: 5 | Status: SHIPPED | OUTPATIENT
Start: 2023-02-21

## 2023-02-23 NOTE — TELEPHONE ENCOUNTER
Denied on January 17  We cover this drug when our criteria are met. The unmet criteria are: has tried or cannot use at least one of two other long-acting insulin products: insulin degludec (i.e. Tresiba) or insulin detemir (i.e. Levemir). This decision was from [facility] Long Acting Insulins Pharmacy Coverage Policy.

## 2023-06-10 NOTE — PROGRESS NOTES
QUICK REFERENCE INFORMATION:  The ABCs of the Annual Wellness Visit  Gal [facility] a 67 [facility] and  Welcome To Medicare (EKG ) and Diabetes (7/19/2022 = 6.2 last A1c - PA needed for basaglar )  .     Medicare Subsequent Wellness Visit    Chief Complaint   Patient presents with   • Welcome To Medicare     EKG    • Diabetes     7/19/2022 = 6.2 last A1c - PA needed for basaglar    physical     HPI   Pt here for medicare wellness visit.  ROS:  Constitutional: no fevers, night sweats or unexplained weight loss  Eyes: no vision changes  ENT: no runny nose, ear pain, sore throat  Cardio: no chest pain, palpitations  Pulm: no shortness of breath, wheezing, or cough  GI: no abdominal pain or changes in bowel movements  : no difficulty urinating  MSK: no difficulty ambulating, no joint pain  Neuro: no weakness, dizziness or headache  Psych: no trouble sleeping  Endo: no change in appetite     Past Medical History:   Diagnosis Date   • CAD (coronary artery disease)    • Diabetes mellitus (HCC)    • Heart attack (HCC)    • Hyperlipidemia    • Hypertension         Past Surgical History:   Procedure Laterality Date   • APPENDECTOMY     • CORONARY ARTERY BYPASS GRAFT  10/29/2018    By Osman Slater with LIMA to LAD, SVG to RCA, sequential SVG to OM/LPL       Family History   Problem Relation Age of Onset   • Diabetes Mother    • Aneurysm Mother    • Hypertension Father    • Diabetes Father    • Heart attack Father    • Diabetes Sister    • No Known Problems Brother    • Diabetes Maternal Grandmother    • Hypertension Maternal Grandmother    • Diabetes Maternal Grandfather    • Hypertension Maternal Grandfather    • Diabetes Paternal Grandmother    • Hypertension Paternal Grandmother    • Diabetes Paternal Grandfather    • Hypertension Paternal Grandfather    • Diabetes Sister    • Diabetes Sister    • Cancer Sister    • Hypertension Brother    • Heart disease Brother   "  • Diabetes Brother    • Hypertension Brother         Social History     Socioeconomic History   • Marital status:    Tobacco Use   • Smoking status: Former Smoker     Packs/day: 0.25     Years: 15.00     Pack years: 3.75     Types: Cigarettes     Quit date: 2007     Years since quitting: 15.7   • Smokeless tobacco: Never Used   • Tobacco comment: 40 years ago   Vaping Use   • Vaping Use: Never used   Substance and Sexual Activity   • Alcohol use: No   • Drug use: No   • Sexual activity: Defer        Current Outpatient Medications   Medication Sig Dispense Refill   • amLODIPine (NORVASC) 10 MG tablet Take 1 tablet by mouth Daily. 90 tablet 3   • aspirin 81 MG EC tablet Take 1 tablet by mouth Daily. 90 tablet 3   • clopidogrel (PLAVIX) 75 MG tablet Take 1 tablet by mouth Daily. (Patient taking differently: Take 75 mg by mouth Every Other Day.) 90 tablet 3   • ezetimibe (ZETIA) 10 MG tablet Take 1 tablet by mouth Daily. 90 tablet 3   • hydroxyurea (Hydrea) 500 MG capsule Take 1 capsule by mouth Daily. 30 capsule 2   • Insulin Glargine (BASAGLAR KWIKPEN) 100 UNIT/ML injection pen Inject 100 Units under the skin into the appropriate area as directed Daily. 10 pen 11   • Insulin Pen Needle (Pen Needles 5/16\") 31G X 8 MM misc 1 each Daily. 30 each 5   • losartan (COZAAR) 100 MG tablet Take 1 tablet by mouth Daily. 90 tablet 3   • metFORMIN (GLUCOPHAGE) 1000 MG tablet Take 1 tablet by mouth 2 (Two) Times a Day With Meals. 90 tablet 3   • metoprolol succinate XL (TOPROL-XL) 100 MG 24 hr tablet Take 1 tablet by mouth Daily. 90 tablet 3   • Multiple Vitamins-Minerals (CENTRUM SILVER PO) Take  by mouth Daily.     • rosuvastatin (CRESTOR) 10 MG tablet Take 1 tablet by mouth Every Night. 90 tablet 1   • sildenafil (VIAGRA) 100 MG tablet TAKE ONE TABLET BY MOUTHDAILY AS NEEDED FOR ERECTILE DYSFUNCTION 10 tablet 0     No current facility-administered medications for this visit.        Allergies   Allergen Reactions   • " "Hydrochlorothiazide Other (See Comments)     Pt states it makes his blood pressure \"real low\"   • Crestor [Rosuvastatin Calcium] Myalgia   • Lipitor [Atorvastatin Calcium] Myalgia   • Penicillins Rash        Immunization History   Administered Date(s) Administered   • COVID-19 (MODERNA) 1st, 2nd, 3rd Dose Only 07/16/2021, 08/17/2021   • FLUAD TRI 65YR+ 02/14/2013   • Flu Vaccine Quad PF >36MO 10/04/2019   • Flu Vaccine Split Quad 11/26/2014   • FluLaval/Fluzone >6mos 11/26/2014, 10/04/2019, 10/23/2020, 11/30/2021   • Influenza, Unspecified 02/14/2013   • Pneumococcal Conjugate 13-Valent (PCV13) 08/03/2015   • Pneumococcal Polysaccharide (PPSV23) 07/14/2008   • Tdap 02/13/2009        The following portions of the patient's history were reviewed and updated as appropriate: allergies, current medications, past family history, past medical history, past social history, past surgical history and problem list.      Objective    Visit Vitals  /78   Pulse 97   Ht 170.2 cm (67.01\")   Wt 86.2 kg (190 lb)   SpO2 94%   BMI 29.75 kg/m²        Physical Exam  Gen Appearance: NAD  HEENT: Normocephalic, PERRLA, no thyromegaly, trache midline  Heart: RRR, normal S1 and S2, no murmur  Lungs: CTA b/l, no wheezing, no crackles  Abdomen: Soft, non-tender, non-distended, no guarding and BSx4  MSK: Moves all extremities well, normal gait, no peripheral edema  Pulses: Palpable and equal b/l  Lymph nodes: No palpable lymphadenopathy   Neuro: No focal deficits       HEALTH RISK ASSESSMENT    1955    Recent Hospitalizations:  No hospitalization(s) within the last year..      Current Medical Providers:  Patient Care Team:  Umer Tovar DO as PCP - General (Internal Medicine)  Paolo Faust IV, MD as Cardiologist (Interventional Cardiology)  Annabelle Lea MD as Consulting Physician (Hematology and Oncology)      Smoking Status:  Social History     Tobacco Use   Smoking Status Former Smoker   • Packs/day: 0.25 "   • Years: 15.00   • Pack years: 3.75   • Types: Cigarettes   • Quit date: 2007   • Years since quitting: 15.7   Smokeless Tobacco Never Used   Tobacco Comment    40 years ago       Alcohol Consumption:  Social History     Substance and Sexual Activity   Alcohol Use No       Depression Screen:   PHQ-2/PHQ-9 Depression Screening 10/5/2022   Retired Total Score -   Little Interest or Pleasure in Doing Things 0-->not at all   Feeling Down, Depressed or Hopeless 0-->not at all   PHQ-9: Brief Depression Severity Measure Score 0       Health Habits and Functional and Cognitive Screening:  Functional & Cognitive Status 10/5/2022   Do you have difficulty preparing food and eating? No   Do you have difficulty bathing yourself, getting dressed or grooming yourself? No   Do you have difficulty using the toilet? No   Do you have difficulty moving around from place to place? No   Do you have trouble with steps or getting out of a bed or a chair? No   Current Diet Diabetic Diet   Dental Exam Up to date   Eye Exam Up to date   Exercise (times per week) 7 times per week   Current Exercises Include Walking;Bicycling Outdoors   Do you need help using the phone?  No   Are you deaf or do you have serious difficulty hearing?  No   Do you need help with transportation? No   Do you need help shopping? No   Do you need help preparing meals?  No   Do you need help with housework?  No   Do you need help with laundry? No   Do you need help taking your medications? No   Do you need help managing money? No   Do you ever drive or ride in a car without wearing a seat belt? Yes   Have you felt unusual stress, anger or loneliness in the last month? No   Who do you live with? Alone   If you need help, do you have trouble finding someone available to you? No   Have you been bothered in the last four weeks by sexual problems? No   Do you have difficulty concentrating, remembering or making decisions? No         Does the patient have evidence of  cognitive impairment? No    Aspirin use counseling? Taking ASA appropriately as indicated      Recent Lab Results:  CMP:  Lab Results   Component Value Date    BUN 12 07/19/2022    CREATININE 0.89 07/19/2022    EGFRIFAFRI 90 08/10/2021    BCR 13.5 07/19/2022     07/19/2022    K 3.8 07/19/2022    CO2 21.8 (L) 07/19/2022    CALCIUM 9.3 07/19/2022    ALBUMIN 4.40 07/19/2022    BILITOT 0.4 07/19/2022    ALKPHOS 61 07/19/2022    AST 33 07/19/2022    ALT 38 07/19/2022     Lipid Panel:  Lab Results   Component Value Date    CHOL 192 07/19/2022    TRIG 71 07/19/2022    HDL 42 07/19/2022    VLDL 13 07/19/2022    LDLHDL 3.23 07/19/2022     HbA1c:  Lab Results   Component Value Date    HGBA1C 6.20 (H) 07/19/2022       Visual Acuity:  No exam data present    Age-appropriate Screening Schedule:  Refer to the list below for future screening recommendations based on patient's age, sex and/or medical conditions. Orders for these recommended tests are listed in the plan section. The patient has been provided with a written plan.    Health Maintenance   Topic Date Due   • ZOSTER VACCINE (1 of 2) Never done   • TDAP/TD VACCINES (2 - Td or Tdap) 02/13/2019   • DIABETIC FOOT EXAM  Never done   • DIABETIC EYE EXAM  Never done   • URINE MICROALBUMIN  10/04/2020   • INFLUENZA VACCINE  08/01/2022   • HEMOGLOBIN A1C  01/19/2023   • LIPID PANEL  07/19/2023          Advance Care Planning:  ACP discussion was declined by the patient. Patient does not have an advance directive, declines further assistance.    Identification of Risk Factors:  Risk factors include: Advance Directive Discussion  Cardiovascular risk  Colon Cancer Screening  Prostate Cancer Screening .    Compared to one year ago, the patient feels his physical health is the same.  Compared to one year ago, the patient feels his mental health is the same.  Reviewed use of high risk medication in the elderly: yes  Reviewed for potential of harmful drug interactions in the  elderly: yes    EKG sinus rhythm with left atrial enlargement and left axis deviation.  Same as prior comparison in 2019.  Rate of 86, , QRS 96, QTc 401    Diagnoses and all orders for this visit:    1. Medicare annual wellness visit, subsequent (Primary)  Counseled on healthy weight, nutrition, physical activity, cancer screening, and immunizations.    2. Type 2 diabetes mellitus with hyperglycemia, with long-term current use of insulin (HCC)  -     CBC & Differential; Future  -     Comprehensive Metabolic Panel; Future  -     Hemoglobin A1c; Future  -     Lipid Panel; Future  -     TSH+Free T4; Future    3. Primary hypertension  -     CBC & Differential; Future  -     Comprehensive Metabolic Panel; Future  -     Hemoglobin A1c; Future  -     Lipid Panel; Future  -     TSH+Free T4; Future    4. Hyperlipidemia LDL goal <70  -     CBC & Differential; Future  -     Comprehensive Metabolic Panel; Future  -     Hemoglobin A1c; Future  -     Lipid Panel; Future  -     TSH+Free T4; Future    5. Encounter for prostate cancer screening  -     PSA Screen; Future    6. Vitamin D deficiency  -     Vitamin D 25 Hydroxy; Future    7. Immunization due          Patient Self-Management and Personalized Health Advice  The patient has been provided with information about: diet, exercise, weight management, prevention of cardiac or vascular disease and supplements and preventive services including:   · Annual Wellness Visit (AWV)  · Cardiovascular Disease Screening Tests (may do this order every 5 years in beneficiaries without signs or symptoms of cardiovascular disease).      Follow Up:  No follow-ups on file.     An After Visit Summary and PPPS with all of these plans were given to the patient.           Dictated Utilizing Dragon Dictation    Please note that portions of this note were completed with a voice recognition program.    Part of this note may be an electronic transcription/translation of spoken language to printed  text using the Dragon Dictation System.   0 = swallows foods/liquids without difficulty

## 2023-07-20 NOTE — PROGRESS NOTES
"    Cardiology Outpatient Visit      Identification: Gal Pierce is a 68 y.o. male who resides in [place], [place]    Reason for visit:  Coronary artery disease involving native      Subjective      Patient is a 68-year-old gentleman who returns today for follow-up of his coronary artery disease and cardiac risk factors.  At his last visit carvedilol was changed to metoprolol succinate.  Since his last visit he has not had any hospitalizations or new medical diagnoses.  He has type 2 diabetes mellitus that he keeps well controlled with hemoglobin A1c of 6.2.  He denies chest pain, dyspnea, orthopnea, palpitations or syncope.  He was taking Zetia and Crestor but he stopped taking them.  He does not like taking any of the cholesterol medicines and reports intolerance due to causing \"stiffness\".  He has never tried a PCSK9 inhibitor.  Blood pressures within the medical record have trended higher than goal.  He reports at home he is usually in the 140s over 80s sometimes systolic will be in the 70s.  He is on dual antiplatelet therapy and tolerating without any active or overt bleeding.    Review of Systems   Constitutional: Negative for malaise/fatigue.   Eyes:  Negative for vision loss in left eye and vision loss in right eye.   Cardiovascular:  Negative for chest pain, dyspnea on exertion, near-syncope, orthopnea, palpitations, paroxysmal nocturnal dyspnea and syncope.   Musculoskeletal:  Negative for myalgias.   Neurological:  Negative for brief paralysis, excessive daytime sleepiness, focal weakness, numbness, paresthesias and weakness.   All other systems reviewed and are negative.    Allergies   Allergen Reactions    Hydrochlorothiazide Other (See Comments)     Pt states it makes his blood pressure \"real low\"    Crestor [Rosuvastatin Calcium] Myalgia    Lipitor [Atorvastatin Calcium] Myalgia    Penicillins Rash         Current Outpatient Medications   Medication Instructions    amLODIPine (NORVASC) 10 " "MG tablet TAKE ONE TABLET BY MOUTH DAILY    aspirin 81 mg, Oral, Daily    clopidogrel (PLAVIX) 75 mg, Oral, Daily    doxazosin (CARDURA) 2 mg, Oral, Nightly    hydroxyurea (HYDREA) 500 mg, Oral, Daily    Insulin Pen Needle (B-D ULTRAFINE III SHORT PEN) 31G X 8 MM misc USE DAILY.    Lantus SoloStar 100 UNIT/ML injection pen INEJCT 100 UNITS UNDER THE SKIN DAILY AS DIRECTED    losartan (COZAAR) 100 mg, Oral, Daily    metoprolol succinate XL (TOPROL-XL) 100 mg, Oral, Daily    Multiple Vitamins-Minerals (CENTRUM SILVER PO) Oral, Daily         Objective     /74 (BP Location: Left arm, Patient Position: Sitting)   Pulse 79   Ht 172.7 cm (68\")   Wt 88.9 kg (196 lb)   SpO2 93%   BMI 29.80 kg/m²       Constitutional:       Appearance: Healthy appearance. Well-developed.   Eyes:      General: Lids are normal. No scleral icterus.     Conjunctiva/sclera: Conjunctivae normal.   HENT:      Head: Normocephalic and atraumatic.   Neck:      Thyroid: No thyromegaly.      Vascular: No carotid bruit or JVD.   Pulmonary:      Effort: Pulmonary effort is normal.      Breath sounds: Normal breath sounds. No wheezing. No rhonchi. No rales.   Cardiovascular:      Normal rate. Regular rhythm.      Murmurs: There is no murmur.      No gallop.  No rub.   Pulses:     Intact distal pulses.   Edema:     Peripheral edema absent.   Abdominal:      General: There is no distension.      Palpations: Abdomen is soft. There is no abdominal mass.   Musculoskeletal:      Cervical back: Normal range of motion. Skin:     General: Skin is warm and dry.      Findings: No rash.   Neurological:      General: No focal deficit present.      Mental Status: Alert and oriented to person, place, and time.      Gait: Gait is intact.   Psychiatric:         Attention and Perception: Attention normal.         Mood and Affect: Mood normal.         Behavior: Behavior normal.       Result Review  (reviewed with patient):            Lab Results   Component Value " "Date    GLUCOSE 72 10/17/2022    BUN 12 10/17/2022    CREATININE 0.85 10/17/2022    EGFRRESULT 96.4 10/17/2022    EGFR 95.1 07/19/2022    BCR 14.1 10/17/2022    K 4.8 10/17/2022    CO2 26.7 10/17/2022    CALCIUM 9.9 10/17/2022    PROTENTOTREF 7.4 10/17/2022    ALBUMIN 4.80 10/17/2022    BILITOT 0.3 10/17/2022    AST 30 10/17/2022    ALT 38 10/17/2022     Lab Results   Component Value Date    WBC 7.26 10/17/2022    HGB 16.6 10/17/2022    HCT 53.2 (H) 10/17/2022    MCV 84.7 10/17/2022     (H) 10/17/2022     Lab Results   Component Value Date    CHOL 192 07/19/2022    CHLPL 175 10/17/2022    TRIG 79 10/17/2022    HDL 46 10/17/2022     (H) 10/17/2022     Lab Results   Component Value Date    HGBA1C 6.70 (H) 10/17/2022           Assessment     Diagnoses and all orders for this visit:    1. Coronary artery disease involving native coronary artery of native heart with angina pectoris (Primary)  Overview:  NSTEMI in 2002 with drug-eluting stent to the LAD  NSTEMI 2004 with drug-eluting stent to the left circumflex.  Echocardiogram (10/26/2018): LVEF normal. Mild LVH. Mild MR.   Cardiac catheterization for NSTEMI  (10/26/2018): severe 3-vessel CAD.  LVEF normal.  CABG (10/29/2018): LIMA to LAD, SVG to RCA, sequential SVG to OM and LPL    Assessment & Plan:  No signs or symptoms of angina  Continue dual antiplatelet therapy with aspirin and Plavix    Orders:  -     clopidogrel (PLAVIX) 75 MG tablet; Take 1 tablet by mouth Daily.  Dispense: 90 tablet; Refill: 3    2. Hyperlipidemia LDL goal <70  Overview:  High intensity statin therapy indicated due to CAD  Historically intolerant to rosuvastatin and atorvastatin    Assessment & Plan:  Refuses Crestor and Zetia.  Refuses all statins due to causing \"stiffness\".  Strongly recommend Repatha or Praluent.  Patient will \"think about it\"      3. Essential hypertension  Overview:  Target blood pressure <130/80 mmHg    Assessment & Plan:  Hypertension is not well " "controlled  Continue metoprolol succinate 100 mg daily  Continue amlodipine 10 mg daily  Continue losartan 100 mg daily  Start doxazosin 2 mg nightly    Orders:  -     losartan (COZAAR) 100 MG tablet; Take 1 tablet by mouth Daily.  Dispense: 90 tablet; Refill: 3    Other orders  -     doxazosin (Cardura) 2 MG tablet; Take 1 tablet by mouth Every Night.  Dispense: 90 tablet; Refill: 1          Plan   Start doxazosin 2 mg nightly  Strongly recommend Praluent or Repatha but patient wants to \"think about it\".  Continue current medications      Follow-up   Return in about 6 months (around 1/25/2024), or if symptoms worsen or fail to improve, for Follow-up with Dr. Faust next visit.        Chiquita Keith, APRN  7/25/2023  " Mother

## 2023-07-24 DIAGNOSIS — I10 ESSENTIAL HYPERTENSION: Chronic | ICD-10-CM

## 2023-07-24 RX ORDER — AMLODIPINE BESYLATE 10 MG/1
TABLET ORAL
Qty: 90 TABLET | Refills: 3 | Status: SHIPPED | OUTPATIENT
Start: 2023-07-24

## 2023-07-25 ENCOUNTER — OFFICE VISIT (OUTPATIENT)
Dept: CARDIOLOGY | Facility: CLINIC | Age: 68
End: 2023-07-25
Payer: MEDICARE

## 2023-07-25 VITALS
WEIGHT: 196 LBS | HEART RATE: 79 BPM | SYSTOLIC BLOOD PRESSURE: 158 MMHG | OXYGEN SATURATION: 93 % | HEIGHT: 68 IN | DIASTOLIC BLOOD PRESSURE: 74 MMHG | BODY MASS INDEX: 29.7 KG/M2

## 2023-07-25 DIAGNOSIS — I25.119 CORONARY ARTERY DISEASE INVOLVING NATIVE CORONARY ARTERY OF NATIVE HEART WITH ANGINA PECTORIS: Primary | Chronic | ICD-10-CM

## 2023-07-25 DIAGNOSIS — E78.5 HYPERLIPIDEMIA LDL GOAL <70: Chronic | ICD-10-CM

## 2023-07-25 DIAGNOSIS — I10 ESSENTIAL HYPERTENSION: Chronic | ICD-10-CM

## 2023-07-25 PROCEDURE — 1159F MED LIST DOCD IN RCRD: CPT | Performed by: NURSE PRACTITIONER

## 2023-07-25 PROCEDURE — 1160F RVW MEDS BY RX/DR IN RCRD: CPT | Performed by: NURSE PRACTITIONER

## 2023-07-25 PROCEDURE — 3078F DIAST BP <80 MM HG: CPT | Performed by: NURSE PRACTITIONER

## 2023-07-25 PROCEDURE — 99214 OFFICE O/P EST MOD 30 MIN: CPT | Performed by: NURSE PRACTITIONER

## 2023-07-25 PROCEDURE — 3077F SYST BP >= 140 MM HG: CPT | Performed by: NURSE PRACTITIONER

## 2023-07-25 RX ORDER — DOXAZOSIN 2 MG/1
2 TABLET ORAL NIGHTLY
Qty: 90 TABLET | Refills: 1 | Status: SHIPPED | OUTPATIENT
Start: 2023-07-25

## 2023-07-25 RX ORDER — CLOPIDOGREL BISULFATE 75 MG/1
75 TABLET ORAL DAILY
Qty: 90 TABLET | Refills: 3 | Status: SHIPPED | OUTPATIENT
Start: 2023-07-25

## 2023-07-25 RX ORDER — LOSARTAN POTASSIUM 100 MG/1
100 TABLET ORAL DAILY
Qty: 90 TABLET | Refills: 3 | Status: SHIPPED | OUTPATIENT
Start: 2023-07-25

## 2023-07-25 NOTE — ASSESSMENT & PLAN NOTE
"Refuses Crestor and Zetia.  Refuses all statins due to causing \"stiffness\".  Strongly recommend Repatha or Praluent.  Patient will \"think about it\"  "

## 2023-07-25 NOTE — ASSESSMENT & PLAN NOTE
Hypertension is not well controlled  Continue metoprolol succinate 100 mg daily  Continue amlodipine 10 mg daily  Continue losartan 100 mg daily  Start doxazosin 2 mg nightly

## 2024-02-01 RX ORDER — DOXAZOSIN 2 MG/1
2 TABLET ORAL NIGHTLY
Qty: 90 TABLET | Refills: 0 | Status: SHIPPED | OUTPATIENT
Start: 2024-02-01

## 2024-02-01 NOTE — TELEPHONE ENCOUNTER
Lab Results   Component Value Date    GLUCOSE 72 10/17/2022    CALCIUM 9.9 10/17/2022     10/17/2022    K 4.8 10/17/2022    CO2 26.7 10/17/2022     10/17/2022    BUN 12 10/17/2022    CREATININE 0.85 10/17/2022    EGFRRESULT 96.4 10/17/2022    EGFR 95.1 07/19/2022    BCR 14.1 10/17/2022    ANIONGAP 13.2 07/19/2022      Needs labs for additional refills.

## 2024-04-01 DIAGNOSIS — E11.65 TYPE 2 DIABETES MELLITUS WITH HYPERGLYCEMIA, WITH LONG-TERM CURRENT USE OF INSULIN: ICD-10-CM

## 2024-04-01 DIAGNOSIS — Z79.4 TYPE 2 DIABETES MELLITUS WITH HYPERGLYCEMIA, WITH LONG-TERM CURRENT USE OF INSULIN: ICD-10-CM

## 2024-04-02 NOTE — TELEPHONE ENCOUNTER
Rx Refill Note  Requested Prescriptions     Pending Prescriptions Disp Refills    Lantus SoloStar 100 UNIT/ML injection pen [Pharmacy Med Name: LANTUS SOLOSTAR 100 UNIT/ML] 30 mL 5     Sig: INJECT 100 UNITS UNDER THE SKIN ONCE DAILY AS DIRECTED      Last office visit with prescribing clinician: 10/5/2022   Last telemedicine visit with prescribing clinician: Visit date not found   Next office visit with prescribing clinician: Visit date not found                         Would you like a call back once the refill request has been completed: [] Yes [] No    If the office needs to give you a call back, can they leave a voicemail: [] Yes [] No    Thania Jackson MA  04/02/24, 12:04 EDT    Patient was to Return in about 3 months (around 1/5/2023) for a1c.     Called patient no answer vm full    OK TO RELAY AND SCHEDULE APPOINTMENT

## 2024-04-03 ENCOUNTER — TELEPHONE (OUTPATIENT)
Dept: FAMILY MEDICINE CLINIC | Facility: CLINIC | Age: 69
End: 2024-04-03

## 2024-04-03 ENCOUNTER — OFFICE VISIT (OUTPATIENT)
Dept: FAMILY MEDICINE CLINIC | Facility: CLINIC | Age: 69
End: 2024-04-03
Payer: MEDICARE

## 2024-04-03 VITALS
WEIGHT: 196.2 LBS | OXYGEN SATURATION: 96 % | BODY MASS INDEX: 29.73 KG/M2 | SYSTOLIC BLOOD PRESSURE: 144 MMHG | HEIGHT: 68 IN | DIASTOLIC BLOOD PRESSURE: 92 MMHG | HEART RATE: 62 BPM

## 2024-04-03 DIAGNOSIS — E11.65 TYPE 2 DIABETES MELLITUS WITH HYPERGLYCEMIA, WITH LONG-TERM CURRENT USE OF INSULIN: Primary | ICD-10-CM

## 2024-04-03 DIAGNOSIS — Z12.5 ENCOUNTER FOR PROSTATE CANCER SCREENING: ICD-10-CM

## 2024-04-03 DIAGNOSIS — Z00.00 PREVENTATIVE HEALTH CARE: ICD-10-CM

## 2024-04-03 DIAGNOSIS — I10 ESSENTIAL HYPERTENSION: Chronic | ICD-10-CM

## 2024-04-03 DIAGNOSIS — I25.119 CORONARY ARTERY DISEASE INVOLVING NATIVE CORONARY ARTERY OF NATIVE HEART WITH ANGINA PECTORIS: Chronic | ICD-10-CM

## 2024-04-03 DIAGNOSIS — Z79.4 TYPE 2 DIABETES MELLITUS WITH HYPERGLYCEMIA, WITH LONG-TERM CURRENT USE OF INSULIN: Primary | ICD-10-CM

## 2024-04-03 DIAGNOSIS — E55.9 VITAMIN D DEFICIENCY, UNSPECIFIED: ICD-10-CM

## 2024-04-03 LAB
EXPIRATION DATE: ABNORMAL
HBA1C MFR BLD: 10.9 % (ref 4.5–5.7)
Lab: ABNORMAL

## 2024-04-03 RX ORDER — ASPIRIN 81 MG/1
81 TABLET ORAL DAILY
Qty: 90 TABLET | Refills: 3 | Status: SHIPPED | OUTPATIENT
Start: 2024-04-03

## 2024-04-03 RX ORDER — METOPROLOL SUCCINATE 100 MG/1
100 TABLET, EXTENDED RELEASE ORAL DAILY
Qty: 90 TABLET | Refills: 3 | Status: SHIPPED | OUTPATIENT
Start: 2024-04-03

## 2024-04-03 RX ORDER — PEN NEEDLE, DIABETIC 31 GX5/16"
1 NEEDLE, DISPOSABLE MISCELLANEOUS DAILY
Qty: 100 EACH | Refills: 3 | Status: SHIPPED | OUTPATIENT
Start: 2024-04-03

## 2024-04-03 RX ORDER — LOSARTAN POTASSIUM 100 MG/1
100 TABLET ORAL DAILY
Qty: 90 TABLET | Refills: 3 | Status: SHIPPED | OUTPATIENT
Start: 2024-04-03

## 2024-04-03 RX ORDER — INSULIN GLARGINE 100 [IU]/ML
INJECTION, SOLUTION SUBCUTANEOUS
Qty: 30 ML | Refills: 5 | OUTPATIENT
Start: 2024-04-03

## 2024-04-03 RX ORDER — BLOOD-GLUCOSE METER
1 KIT MISCELLANEOUS AS NEEDED
Qty: 1 EACH | Refills: 0 | Status: SHIPPED | OUTPATIENT
Start: 2024-04-03 | End: 2024-04-03 | Stop reason: SDUPTHER

## 2024-04-03 RX ORDER — BLOOD-GLUCOSE METER
1 KIT MISCELLANEOUS DAILY
Qty: 1 EACH | Refills: 0 | Status: SHIPPED | OUTPATIENT
Start: 2024-04-03

## 2024-04-03 RX ORDER — INSULIN GLARGINE 100 [IU]/ML
100 INJECTION, SOLUTION SUBCUTANEOUS DAILY
Qty: 60 ML | Refills: 5 | Status: SHIPPED | OUTPATIENT
Start: 2024-04-03

## 2024-04-03 RX ORDER — AMLODIPINE BESYLATE 10 MG/1
10 TABLET ORAL DAILY
Qty: 90 TABLET | Refills: 3 | Status: SHIPPED | OUTPATIENT
Start: 2024-04-03

## 2024-04-03 RX ORDER — CLOPIDOGREL BISULFATE 75 MG/1
75 TABLET ORAL DAILY
Qty: 90 TABLET | Refills: 3 | Status: SHIPPED | OUTPATIENT
Start: 2024-04-03

## 2024-04-03 NOTE — PROGRESS NOTES
Chief Complaint   Patient presents with    Diabetes     Follow up        HPI:  Gal Pierce is a 68 y.o. male who presents today for follow-up diabetes and hypertension.    ROS:  Constitutional: no fevers, night sweats or unexplained weight loss  Eyes: no vision changes  ENT: no runny nose, ear pain, sore throat  Cardio: no chest pain, palpitations  Pulm: no shortness of breath, wheezing, or cough  GI: no abdominal pain or changes in bowel movements  : no difficulty urinating  MSK: no difficulty ambulating, no joint pain  Neuro: no weakness, dizziness or headache  Psych: no trouble sleeping  Endo: no change in appetite      Past Medical History:   Diagnosis Date    CAD (coronary artery disease)     Diabetes mellitus     Elevated red blood cell count     Heart attack     Hyperlipidemia     Hypertension       Family History   Problem Relation Age of Onset    Diabetes Mother     Aneurysm Mother     Hypertension Father     Diabetes Father     Heart attack Father     Diabetes Sister     No Known Problems Brother     Diabetes Maternal Grandmother     Hypertension Maternal Grandmother     Diabetes Maternal Grandfather     Hypertension Maternal Grandfather     Diabetes Paternal Grandmother     Hypertension Paternal Grandmother     Diabetes Paternal Grandfather     Hypertension Paternal Grandfather     Diabetes Sister     Diabetes Sister     Cancer Sister     Hypertension Brother     Heart disease Brother     Diabetes Brother     Hypertension Brother       Social History     Socioeconomic History    Marital status:    Tobacco Use    Smoking status: Former     Current packs/day: 0.00     Average packs/day: 0.3 packs/day for 15.0 years (3.8 ttl pk-yrs)     Types: Cigarettes     Start date:      Quit date:      Years since quittin.2     Passive exposure: Past    Smokeless tobacco: Never    Tobacco comments:     40 years ago   Vaping Use    Vaping status: Never Used   Substance and Sexual Activity  "   Alcohol use: No    Drug use: No    Sexual activity: Defer      Allergies   Allergen Reactions    Hydrochlorothiazide Other (See Comments)     Pt states it makes his blood pressure \"real low\"    Crestor [Rosuvastatin Calcium] Myalgia    Lipitor [Atorvastatin Calcium] Myalgia    Penicillins Rash      Immunization History   Administered Date(s) Administered    COVID-19 (MODERNA) 1st,2nd,3rd Dose Monovalent 07/16/2021, 08/17/2021    FLUAD TRI 65YR+ 02/14/2013    Flu Vaccine Quad PF >36MO 10/04/2019    Flu Vaccine Split Quad 11/26/2014    Fluzone (or Fluarix & Flulaval for VFC) >6mos 11/26/2014, 10/04/2019, 10/23/2020, 11/30/2021    Fluzone High-Dose 65+yrs 10/05/2022    Influenza Seasonal Injectable 02/14/2013    Influenza, Unspecified 02/14/2013    Pneumococcal Conjugate 13-Valent (PCV13) 08/03/2015    Pneumococcal Polysaccharide (PPSV23) 07/14/2008    Tdap 02/13/2009        PE:  Vitals:    04/03/24 1336   BP: 144/92   Pulse: 62   SpO2: 96%      Body mass index is 29.84 kg/m².    Gen Appearance: NAD  HEENT: Normocephalic, PERRLA, no thyromegaly, trache midline  Heart: RRR, normal S1 and S2, no murmur  Lungs: CTA b/l, no wheezing, no crackles  Abdomen: Soft, non-tender, non-distended, no guarding and BSx4  MSK: Moves all extremities well, normal gait, no peripheral edema  Pulses: Palpable and equal b/l  Lymph nodes: No palpable lymphadenopathy   Neuro: No focal deficits      Current Outpatient Medications   Medication Sig Dispense Refill    amLODIPine (NORVASC) 10 MG tablet Take 1 tablet by mouth Daily. 90 tablet 3    aspirin 81 MG EC tablet Take 1 tablet by mouth Daily. 90 tablet 3    clopidogrel (PLAVIX) 75 MG tablet Take 1 tablet by mouth Daily. 90 tablet 3    doxazosin (CARDURA) 2 MG tablet TAKE ONE TABLET BY MOUTH ONCE NIGHTLY 90 tablet 0    glucose blood test strip 1 each by Other route Daily. Use as instructed 100 each 3    glucose monitor monitoring kit Use 1 each Daily. 1 each 0    hydroxyurea (Hydrea) 500 " MG capsule Take 1 capsule by mouth Daily. 30 capsule 2    Insulin Glargine (Lantus SoloStar) 100 UNIT/ML injection pen Inject 100 Units under the skin into the appropriate area as directed Daily. 60 mL 5    Insulin Pen Needle (B-D ULTRAFINE III SHORT PEN) 31G X 8 MM misc Inject 1 each under the skin into the appropriate area as directed Daily. 100 each 3    losartan (COZAAR) 100 MG tablet Take 1 tablet by mouth Daily. 90 tablet 3    metoprolol succinate XL (TOPROL-XL) 100 MG 24 hr tablet Take 1 tablet by mouth Daily. 90 tablet 3    Multiple Vitamins-Minerals (CENTRUM SILVER PO) Take  by mouth Daily.       No current facility-administered medications for this visit.      A1c elevated at 10.9%.  Patient has been using insulin sporadically due to lack of supplies.  Recheck A1c 3 months with the previous dose Lantus.  Continue 100 units nightly.  Blood pressure elevated but he has been without medication for a few days.  Refills today.    Diagnoses and all orders for this visit:    1. Type 2 diabetes mellitus with hyperglycemia, with long-term current use of insulin (Primary)  -     POC Glycosylated Hemoglobin (Hb A1C)  -     Insulin Glargine (Lantus SoloStar) 100 UNIT/ML injection pen; Inject 100 Units under the skin into the appropriate area as directed Daily.  Dispense: 60 mL; Refill: 5    2. Essential hypertension  -     amLODIPine (NORVASC) 10 MG tablet; Take 1 tablet by mouth Daily.  Dispense: 90 tablet; Refill: 3  -     losartan (COZAAR) 100 MG tablet; Take 1 tablet by mouth Daily.  Dispense: 90 tablet; Refill: 3    3. Coronary artery disease involving native coronary artery of native heart with angina pectoris  -     metoprolol succinate XL (TOPROL-XL) 100 MG 24 hr tablet; Take 1 tablet by mouth Daily.  Dispense: 90 tablet; Refill: 3  -     aspirin 81 MG EC tablet; Take 1 tablet by mouth Daily.  Dispense: 90 tablet; Refill: 3  -     clopidogrel (PLAVIX) 75 MG tablet; Take 1 tablet by mouth Daily.  Dispense: 90  tablet; Refill: 3    4. Preventative health care  -     CBC & Differential; Future  -     Comprehensive Metabolic Panel; Future  -     Hemoglobin A1c; Future  -     Lipid Panel; Future  -     TSH+Free T4; Future  -     Urinalysis With Culture If Indicated - Urine, Clean Catch; Future  -     Vitamin D,25-Hydroxy; Future    5. Encounter for prostate cancer screening  -     PSA Screen; Future    6. Vitamin D deficiency, unspecified  -     Vitamin D,25-Hydroxy; Future    Other orders  -     Insulin Pen Needle (B-D ULTRAFINE III SHORT PEN) 31G X 8 MM misc; Inject 1 each under the skin into the appropriate area as directed Daily.  Dispense: 100 each; Refill: 3  -     Discontinue: glucose monitor monitoring kit; Use 1 each As Needed (check fasting sugar).  Dispense: 1 each; Refill: 0  -     Discontinue: glucose blood test strip; Use as instructed  Dispense: 100 each; Refill: 3  -     glucose monitor monitoring kit; Use 1 each Daily.  Dispense: 1 each; Refill: 0  -     glucose blood test strip; 1 each by Other route Daily. Use as instructed  Dispense: 100 each; Refill: 3         Return in about 3 months (around 7/3/2024) for Medicare Wellness.     Dictated Utilizing Dragon Dictation    Please note that portions of this note were completed with a voice recognition program.    Part of this note may be an electronic transcription/translation of spoken language to printed text using the Dragon Dictation System.

## 2024-04-03 NOTE — TELEPHONE ENCOUNTER
2nd attempt    Patient was to Return in about 3 months (around 1/5/2023) for a1c.      Called patient no answer vm full     OK TO RELAY AND SCHEDULE APPOINTMENT

## 2024-04-03 NOTE — TELEPHONE ENCOUNTER
Name: Gal Pierce Edward      Relationship: Self      Best Callback Number: [phone]      HUB PROVIDED THE RELAY MESSAGE FROM THE OFFICE      PATIENT: SCHEDULED PER NOTE    ADDITIONAL INFORMATION:

## 2024-04-03 NOTE — TELEPHONE ENCOUNTER
Caller: [facility] [id number] - [facility] [address] [place] [address] - [phone]  - [phone] FX    Relationship: Pharmacy    Best call back number: [phone]    Which medication are you concerned about: GLUCOSE MONITOR AND TEST STRIPS    Who prescribed you this medication: DR VALENCIA    What are your concerns: PHARMACY NEEDS A FREQUENCY FOR INSURANCE PURPOSES. CAN NOT USE AS DIRECTED. PLEASE ADVISE

## 2024-05-08 RX ORDER — DOXAZOSIN 2 MG/1
2 TABLET ORAL NIGHTLY
Qty: 90 TABLET | Refills: 0 | Status: SHIPPED | OUTPATIENT
Start: 2024-05-08

## 2024-07-23 ENCOUNTER — LAB (OUTPATIENT)
Dept: LAB | Facility: HOSPITAL | Age: 69
End: 2024-07-23
Payer: MEDICARE

## 2024-07-23 ENCOUNTER — TELEPHONE (OUTPATIENT)
Dept: FAMILY MEDICINE CLINIC | Facility: CLINIC | Age: 69
End: 2024-07-23

## 2024-07-23 ENCOUNTER — OFFICE VISIT (OUTPATIENT)
Dept: FAMILY MEDICINE CLINIC | Facility: CLINIC | Age: 69
End: 2024-07-23
Payer: MEDICARE

## 2024-07-23 VITALS
DIASTOLIC BLOOD PRESSURE: 80 MMHG | HEART RATE: 86 BPM | OXYGEN SATURATION: 99 % | BODY MASS INDEX: 29.83 KG/M2 | WEIGHT: 196.8 LBS | HEIGHT: 68 IN | SYSTOLIC BLOOD PRESSURE: 164 MMHG

## 2024-07-23 DIAGNOSIS — E11.65 TYPE 2 DIABETES MELLITUS WITH HYPERGLYCEMIA, WITH LONG-TERM CURRENT USE OF INSULIN: ICD-10-CM

## 2024-07-23 DIAGNOSIS — E55.9 VITAMIN D DEFICIENCY: ICD-10-CM

## 2024-07-23 DIAGNOSIS — I25.119 CORONARY ARTERY DISEASE INVOLVING NATIVE CORONARY ARTERY OF NATIVE HEART WITH ANGINA PECTORIS: ICD-10-CM

## 2024-07-23 DIAGNOSIS — Z79.4 TYPE 2 DIABETES MELLITUS WITH HYPERGLYCEMIA, WITH LONG-TERM CURRENT USE OF INSULIN: ICD-10-CM

## 2024-07-23 DIAGNOSIS — I10 ESSENTIAL HYPERTENSION: ICD-10-CM

## 2024-07-23 DIAGNOSIS — Z12.11 COLON CANCER SCREENING: ICD-10-CM

## 2024-07-23 DIAGNOSIS — Z12.5 ENCOUNTER FOR PROSTATE CANCER SCREENING: ICD-10-CM

## 2024-07-23 DIAGNOSIS — Z00.00 MEDICARE ANNUAL WELLNESS VISIT, SUBSEQUENT: Primary | ICD-10-CM

## 2024-07-23 LAB
ALBUMIN SERPL-MCNC: 4.4 G/DL (ref 3.5–5.2)
ALBUMIN/GLOB SERPL: 1.3 G/DL
ALP SERPL-CCNC: 58 U/L (ref 39–117)
ALT SERPL W P-5'-P-CCNC: 41 U/L (ref 1–41)
ANION GAP SERPL CALCULATED.3IONS-SCNC: 13.2 MMOL/L (ref 5–15)
AST SERPL-CCNC: 29 U/L (ref 1–40)
BASOPHILS # BLD AUTO: 0.05 10*3/MM3 (ref 0–0.2)
BASOPHILS NFR BLD AUTO: 0.9 % (ref 0–1.5)
BILIRUB SERPL-MCNC: 0.3 MG/DL (ref 0–1.2)
BUN SERPL-MCNC: 12 MG/DL (ref 8–23)
BUN/CREAT SERPL: 14.1 (ref 7–25)
CALCIUM SPEC-SCNC: 9.4 MG/DL (ref 8.6–10.5)
CHLORIDE SERPL-SCNC: 103 MMOL/L (ref 98–107)
CHOLEST SERPL-MCNC: 206 MG/DL (ref 0–200)
CO2 SERPL-SCNC: 20.8 MMOL/L (ref 22–29)
CREAT SERPL-MCNC: 0.85 MG/DL (ref 0.76–1.27)
DEPRECATED RDW RBC AUTO: 47 FL (ref 37–54)
EGFRCR SERPLBLD CKD-EPI 2021: 95.2 ML/MIN/1.73
EOSINOPHIL # BLD AUTO: 0.19 10*3/MM3 (ref 0–0.4)
EOSINOPHIL NFR BLD AUTO: 3.3 % (ref 0.3–6.2)
ERYTHROCYTE [DISTWIDTH] IN BLOOD BY AUTOMATED COUNT: 14.8 % (ref 12.3–15.4)
GLOBULIN UR ELPH-MCNC: 3.3 GM/DL
GLUCOSE SERPL-MCNC: 216 MG/DL (ref 65–99)
HBA1C MFR BLD: 8.2 % (ref 4.8–5.6)
HCT VFR BLD AUTO: 51.3 % (ref 37.5–51)
HDLC SERPL-MCNC: 43 MG/DL (ref 40–60)
HGB BLD-MCNC: 17.2 G/DL (ref 13–17.7)
IMM GRANULOCYTES # BLD AUTO: 0.03 10*3/MM3 (ref 0–0.05)
IMM GRANULOCYTES NFR BLD AUTO: 0.5 % (ref 0–0.5)
LDLC SERPL CALC-MCNC: 147 MG/DL (ref 0–100)
LDLC/HDLC SERPL: 3.38 {RATIO}
LYMPHOCYTES # BLD AUTO: 2.39 10*3/MM3 (ref 0.7–3.1)
LYMPHOCYTES NFR BLD AUTO: 41.9 % (ref 19.6–45.3)
MCH RBC QN AUTO: 29.6 PG (ref 26.6–33)
MCHC RBC AUTO-ENTMCNC: 33.5 G/DL (ref 31.5–35.7)
MCV RBC AUTO: 88.1 FL (ref 79–97)
MONOCYTES # BLD AUTO: 0.43 10*3/MM3 (ref 0.1–0.9)
MONOCYTES NFR BLD AUTO: 7.5 % (ref 5–12)
NEUTROPHILS NFR BLD AUTO: 2.62 10*3/MM3 (ref 1.7–7)
NEUTROPHILS NFR BLD AUTO: 45.9 % (ref 42.7–76)
NRBC BLD AUTO-RTO: 0 /100 WBC (ref 0–0.2)
PLATELET # BLD AUTO: 397 10*3/MM3 (ref 140–450)
PMV BLD AUTO: 10.2 FL (ref 6–12)
POTASSIUM SERPL-SCNC: 3.9 MMOL/L (ref 3.5–5.2)
PROT SERPL-MCNC: 7.7 G/DL (ref 6–8.5)
RBC # BLD AUTO: 5.82 10*6/MM3 (ref 4.14–5.8)
SODIUM SERPL-SCNC: 137 MMOL/L (ref 136–145)
TRIGL SERPL-MCNC: 89 MG/DL (ref 0–150)
VLDLC SERPL-MCNC: 16 MG/DL (ref 5–40)
WBC NRBC COR # BLD AUTO: 5.71 10*3/MM3 (ref 3.4–10.8)

## 2024-07-23 PROCEDURE — 85025 COMPLETE CBC W/AUTO DIFF WBC: CPT

## 2024-07-23 PROCEDURE — G0103 PSA SCREENING: HCPCS

## 2024-07-23 PROCEDURE — 83036 HEMOGLOBIN GLYCOSYLATED A1C: CPT

## 2024-07-23 PROCEDURE — 3079F DIAST BP 80-89 MM HG: CPT | Performed by: INTERNAL MEDICINE

## 2024-07-23 PROCEDURE — 3077F SYST BP >= 140 MM HG: CPT | Performed by: INTERNAL MEDICINE

## 2024-07-23 PROCEDURE — 96160 PT-FOCUSED HLTH RISK ASSMT: CPT | Performed by: INTERNAL MEDICINE

## 2024-07-23 PROCEDURE — 1126F AMNT PAIN NOTED NONE PRSNT: CPT | Performed by: INTERNAL MEDICINE

## 2024-07-23 PROCEDURE — 84439 ASSAY OF FREE THYROXINE: CPT

## 2024-07-23 PROCEDURE — 80061 LIPID PANEL: CPT

## 2024-07-23 PROCEDURE — 1159F MED LIST DOCD IN RCRD: CPT | Performed by: INTERNAL MEDICINE

## 2024-07-23 PROCEDURE — 84443 ASSAY THYROID STIM HORMONE: CPT

## 2024-07-23 PROCEDURE — 3046F HEMOGLOBIN A1C LEVEL >9.0%: CPT | Performed by: INTERNAL MEDICINE

## 2024-07-23 PROCEDURE — 1160F RVW MEDS BY RX/DR IN RCRD: CPT | Performed by: INTERNAL MEDICINE

## 2024-07-23 PROCEDURE — 1170F FXNL STATUS ASSESSED: CPT | Performed by: INTERNAL MEDICINE

## 2024-07-23 PROCEDURE — G0439 PPPS, SUBSEQ VISIT: HCPCS | Performed by: INTERNAL MEDICINE

## 2024-07-23 PROCEDURE — 82306 VITAMIN D 25 HYDROXY: CPT

## 2024-07-23 PROCEDURE — 80053 COMPREHEN METABOLIC PANEL: CPT

## 2024-07-23 PROCEDURE — 99397 PER PM REEVAL EST PAT 65+ YR: CPT | Performed by: INTERNAL MEDICINE

## 2024-07-23 RX ORDER — BLOOD-GLUCOSE METER
EACH MISCELLANEOUS
COMMUNITY
Start: 2024-04-03

## 2024-07-23 RX ORDER — HYDROCHLOROTHIAZIDE 12.5 MG/1
6.25 TABLET ORAL DAILY
Qty: 30 TABLET | Refills: 2 | Status: SHIPPED | OUTPATIENT
Start: 2024-07-23

## 2024-07-23 NOTE — TELEPHONE ENCOUNTER
Umer Tovar, DO [facility]  Clinical Pool1 hour ago (2:32 PM)       It's not an allergy, his BP dropped a bit too low last time. Using it at a lower dose this time.   Pharmacy notified.

## 2024-07-23 NOTE — PROGRESS NOTES
The ABCs of the Annual Wellness Visit  Subsequent Medicare Wellness Visit    Subjective      Major Charles Pierce is a 69 y.o. male who presents for a Subsequent Medicare Wellness Visit and annual exam.    The following portions of the patient's history were reviewed and   updated as appropriate: allergies, current medications, past family history, past medical history, past social history, past surgical history, and problem list.    Compared to one year ago, the patient feels his physical   health is the same.    Compared to one year ago, the patient feels his mental   health is the same.    Recent Hospitalizations:  He was not admitted to the hospital during the last year.       Current Medical Providers:  Patient Care Team:  Umer Tovar DO as PCP - General (Internal Medicine)  Paolo Faust IV, MD as Cardiologist (Interventional Cardiology)  Annabelle Lea MD as Consulting Physician (Hematology and Oncology)    Outpatient Medications Prior to Visit   Medication Sig Dispense Refill    amLODIPine (NORVASC) 10 MG tablet Take 1 tablet by mouth Daily. 90 tablet 3    aspirin 81 MG EC tablet Take 1 tablet by mouth Daily. 90 tablet 3    Blood Glucose Monitoring Suppl (Accu-Chek Guide Me) w/Device kit       clopidogrel (PLAVIX) 75 MG tablet Take 1 tablet by mouth Daily. 90 tablet 3    glucose blood test strip 1 each by Other route Daily. Use as instructed 100 each 3    glucose monitor monitoring kit Use 1 each Daily. 1 each 0    hydroxyurea (Hydrea) 500 MG capsule Take 1 capsule by mouth Daily. 30 capsule 2    Insulin Pen Needle (B-D ULTRAFINE III SHORT PEN) 31G X 8 MM misc Inject 1 each under the skin into the appropriate area as directed Daily. 100 each 3    losartan (COZAAR) 100 MG tablet Take 1 tablet by mouth Daily. 90 tablet 3    metoprolol succinate XL (TOPROL-XL) 100 MG 24 hr tablet Take 1 tablet by mouth Daily. 90 tablet 3    Multiple Vitamins-Minerals (CENTRUM SILVER PO) Take  by mouth  "Daily.      doxazosin (CARDURA) 2 MG tablet TAKE ONE TABLET BY MOUTH ONCE NIGHTLY 90 tablet 0    Insulin Glargine (Lantus SoloStar) 100 UNIT/ML injection pen Inject 100 Units under the skin into the appropriate area as directed Daily. 60 mL 5     No facility-administered medications prior to visit.       No opioid medication identified on active medication list. I have reviewed chart for other potential  high risk medication/s and harmful drug interactions in the elderly.        Aspirin is on active medication list. Aspirin use is indicated based on review of current medical condition/s. Pros and cons of this therapy have been discussed today. Benefits of this medication outweigh potential harm.  Patient has been encouraged to continue taking this medication.  .      Patient Active Problem List   Diagnosis    Essential hypertension    Type 2 diabetes mellitus with hyperglycemia, with long-term current use of insulin    Coronary artery disease involving native coronary artery of native heart with angina pectoris    Hyperlipidemia LDL goal <70    Polycythemia     Advance Care Planning   Advance Care Planning     Advance Directive is not on file.  ACP discussion was held with the patient during this visit. Patient does not have an advance directive, information provided.     Objective    Vitals:    07/23/24 1303 07/23/24 1333   BP: 170/84 164/80   Pulse: 86    SpO2: 99%    Weight: 89.3 kg (196 lb 12.8 oz)    Height: 172.7 cm (67.99\")    PainSc: 0-No pain      Estimated body mass index is 29.93 kg/m² as calculated from the following:    Height as of this encounter: 172.7 cm (67.99\").    Weight as of this encounter: 89.3 kg (196 lb 12.8 oz).    BMI is >= 25 and <30. (Overweight) The following options were offered after discussion;: exercise counseling/recommendations and nutrition counseling/recommendations      Does the patient have evidence of cognitive impairment?   No            HEALTH RISK ASSESSMENT    Smoking " Status:  Social History     Tobacco Use   Smoking Status Former    Current packs/day: 0.00    Average packs/day: 0.3 packs/day for 15.0 years (3.8 ttl pk-yrs)    Types: Cigarettes    Start date:     Quit date:     Years since quittin.5    Passive exposure: Past   Smokeless Tobacco Never   Tobacco Comments    40 years ago     Alcohol Consumption:  Social History     Substance and Sexual Activity   Alcohol Use No     Fall Risk Screen:    GONZALOADI Fall Risk Assessment was completed, and patient is at LOW risk for falls.Assessment completed on:2024    Depression Screenin/23/2024     1:05 PM   PHQ-2/PHQ-9 Depression Screening   Little Interest or Pleasure in Doing Things 0-->not at all   Feeling Down, Depressed or Hopeless 0-->not at all   PHQ-9: Brief Depression Severity Measure Score 0       Health Habits and Functional and Cognitive Screenin/23/2024     1:04 PM   Functional & Cognitive Status   Do you have difficulty preparing food and eating? No   Do you have difficulty bathing yourself, getting dressed or grooming yourself? No   Do you have difficulty using the toilet? No   Do you have difficulty moving around from place to place? No   Do you have trouble with steps or getting out of a bed or a chair? No   Current Diet Well Balanced Diet   Dental Exam Up to date   Eye Exam Up to date   Exercise (times per week) 7 times per week   Current Exercises Include Walking   Do you need help using the phone?  No   Are you deaf or do you have serious difficulty hearing?  No   Do you need help to go to places out of walking distance? No   Do you need help shopping? No   Do you need help preparing meals?  No   Do you need help with housework?  No   Do you need help with laundry? No   Do you need help taking your medications? No   Do you need help managing money? No   Do you ever drive or ride in a car without wearing a seat belt? No   Have you felt unusual stress, anger or loneliness in the last  month? No   Who do you live with? Alone   If you need help, do you have trouble finding someone available to you? Yes   Have you been bothered in the last four weeks by sexual problems? No   Do you have difficulty concentrating, remembering or making decisions? No       Age-appropriate Screening Schedule:  Refer to the list below for future screening recommendations based on patient's age, sex and/or medical conditions. Orders for these recommended tests are listed in the plan section. The patient has been provided with a written plan.    Health Maintenance   Topic Date Due    DIABETIC EYE EXAM  Never done    ZOSTER VACCINE (1 of 2) Never done    TDAP/TD VACCINES (2 - Td or Tdap) 02/13/2019    HEPATITIS C SCREENING  Never done    DIABETIC FOOT EXAM  10/04/2019    URINE MICROALBUMIN  10/04/2020    Pneumococcal Vaccine 65+ (3 of 3 - PPSV23 or PCV20) 07/14/2022    BMI FOLLOWUP  11/30/2022    COVID-19 Vaccine (3 - 2023-24 season) 09/01/2023    ANNUAL WELLNESS VISIT  10/05/2023    LIPID PANEL  10/17/2023    HEMOGLOBIN A1C  10/03/2024    COLORECTAL CANCER SCREENING  10/13/2024    INFLUENZA VACCINE  08/01/2024    AAA SCREEN (ONE-TIME)  Completed                Physical Exam  Gen Appearance: NAD  HEENT: Normocephalic, PERRLA, no thyromegaly, trache midline  Heart: RRR, normal S1 and S2, no murmur  Lungs: CTA b/l, no wheezing, no crackles  Abdomen: Soft, non-tender, non-distended, no guarding and BSx4  MSK: Moves all extremities well, normal gait, no peripheral edema  Pulses: Palpable and equal b/l  Lymph nodes: No palpable lymphadenopathy   Neuro: No focal deficits     CMS Preventative Services Quick Reference  Risk Factors Identified During Encounter:    None Identified    The above risks/problems have been discussed with the patient.  Pertinent information has been shared with the patient in the After Visit Summary.    Diagnoses and all orders for this visit:    1. Medicare annual wellness visit, subsequent  (Primary)  Counseled on healthy weight, nutrition, physical activity, cancer screening, and immunizations.    2. Essential hypertension  -     CBC & Differential; Future  -     Comprehensive Metabolic Panel; Future  -     Hemoglobin A1c; Future  -     Lipid Panel; Future  -     PSA Screen; Future  -     TSH+Free T4; Future  -     Urinalysis With Culture If Indicated - Urine, Clean Catch; Future  -     Vitamin D,25-Hydroxy; Future  -     hydroCHLOROthiazide 12.5 MG tablet; Take 0.5 tablets by mouth Daily.  Dispense: 30 tablet; Refill: 2  Add on half tab of HCTZ.  Blood pressure dropped to low last time he tried it several years ago.  Elevated systolic today 170 and 164 on repeat.  May need to discontinue doxazosin if dizziness occurs on HCTZ depending on how his pressure response  3. Type 2 diabetes mellitus with hyperglycemia, with long-term current use of insulin  -     CBC & Differential; Future  -     Comprehensive Metabolic Panel; Future  -     Hemoglobin A1c; Future  -     Lipid Panel; Future  -     PSA Screen; Future  -     TSH+Free T4; Future  -     Urinalysis With Culture If Indicated - Urine, Clean Catch; Future  -     Vitamin D,25-Hydroxy; Future  -     Microalbumin / Creatinine Urine Ratio - Urine, Clean Catch; Future    4. Coronary artery disease involving native coronary artery of native heart with angina pectoris  -     CBC & Differential; Future  -     Comprehensive Metabolic Panel; Future  -     Hemoglobin A1c; Future  -     Lipid Panel; Future  -     PSA Screen; Future  -     TSH+Free T4; Future  -     Urinalysis With Culture If Indicated - Urine, Clean Catch; Future  -     Vitamin D,25-Hydroxy; Future    5. Vitamin D deficiency  -     CBC & Differential; Future  -     Comprehensive Metabolic Panel; Future  -     Hemoglobin A1c; Future  -     Lipid Panel; Future  -     PSA Screen; Future  -     TSH+Free T4; Future  -     Urinalysis With Culture If Indicated - Urine, Clean Catch; Future  -      Vitamin D,25-Hydroxy; Future    6. Encounter for prostate cancer screening  -     CBC & Differential; Future  -     Comprehensive Metabolic Panel; Future  -     Hemoglobin A1c; Future  -     Lipid Panel; Future  -     PSA Screen; Future  -     TSH+Free T4; Future  -     Urinalysis With Culture If Indicated - Urine, Clean Catch; Future  -     Vitamin D,25-Hydroxy; Future    7. Colon cancer screening  -     Ambulatory Referral For Screening Colonoscopy        Follow Up:   Next Medicare Wellness visit to be scheduled in 1 year.      An After Visit Summary and PPPS were made available to the patient.

## 2024-07-23 NOTE — TELEPHONE ENCOUNTER
Pharmacy Name:  [facility]     Pharmacy representative name: REBEKAH    Pharmacy representative phone number: [phone]    What medication are you calling in regards to: hydroCHLOROthiazide 12.5 MG tablet     What question does the pharmacy have: PATIENT IS ALLERGIC TO THIS MEDICATION AND NEEDS AN ALTERNATIVE CALLED IN

## 2024-07-24 LAB
25(OH)D3 SERPL-MCNC: 44 NG/ML (ref 30–100)
PSA SERPL-MCNC: 6.17 NG/ML (ref 0–4)
T4 FREE SERPL-MCNC: 1.09 NG/DL (ref 0.92–1.68)
TSH SERPL DL<=0.05 MIU/L-ACNC: 0.61 UIU/ML (ref 0.27–4.2)

## 2024-08-20 RX ORDER — DOXAZOSIN 2 MG/1
2 TABLET ORAL NIGHTLY
Qty: 90 TABLET | Refills: 0 | OUTPATIENT
Start: 2024-08-20

## 2024-08-23 NOTE — PROGRESS NOTES
"    Cardiology Outpatient Visit      Identification: Gal Pierce is a 69 y.o. male who resides in [place], [place].     Reason for visit:  Coronary Artery Disease (Coronary artery disease involving native coronary artery of native heart with angina pectoris//)      Subjective      Major returns to the office today for routine follow-up.  He denies any cardiovascular complaints of angina or heart failure.  No TIA or stroke symptoms.  He is tolerating his present medicines.  He has had recent blood work which shows uncontrolled diabetes.  He is presently on Lantus monotherapy.  Lab work also shows uncontrolled LDL cholesterol.    Review of Systems   Constitutional: Negative for malaise/fatigue.   Eyes:  Negative for vision loss in left eye and vision loss in right eye.   Cardiovascular:  Negative for chest pain, dyspnea on exertion, near-syncope, orthopnea, palpitations, paroxysmal nocturnal dyspnea and syncope.   Musculoskeletal:  Negative for myalgias.   Neurological:  Negative for brief paralysis, excessive daytime sleepiness, focal weakness, numbness, paresthesias and weakness.   All other systems reviewed and are negative.      Allergies   Allergen Reactions    Hydrochlorothiazide Other (See Comments)     Pt states it makes his blood pressure \"real low\"    Crestor [Rosuvastatin Calcium] Myalgia    Lipitor [Atorvastatin Calcium] Myalgia    Penicillins Rash         Current Outpatient Medications   Medication Instructions    amLODIPine (NORVASC) 10 mg, Oral, Daily    aspirin 81 mg, Oral, Daily    Blood Glucose Monitoring Suppl (Accu-Chek Guide Me) w/Device kit     clopidogrel (PLAVIX) 75 mg, Oral, Daily    fluticasone (FLONASE) 50 MCG/ACT nasal spray 1 spray, Nasal, Daily    glucose blood test strip 1 each, Other, Daily, Use as instructed    glucose monitor monitoring kit 1 each, Does not apply, Daily    hydroCHLOROthiazide 6.25 mg, Oral, Daily    hydroxyurea (HYDREA) 500 mg, Oral, Daily    Insulin Pen Needle " "(B-D ULTRAFINE III SHORT PEN) 31G X 8 MM misc 1 each, Subcutaneous, Daily    losartan (COZAAR) 100 mg, Oral, Daily    metoprolol succinate XL (TOPROL-XL) 100 mg, Oral, Daily    Multiple Vitamins-Minerals (CENTRUM SILVER PO) Oral, Daily         Objective     /88 (BP Location: Left arm, Patient Position: Sitting, Cuff Size: Adult)   Pulse 81   Ht 172.7 cm (68\")   Wt 89.8 kg (198 lb)   SpO2 94%   BMI 30.11 kg/m²       Constitutional:       Appearance: Healthy appearance.   Eyes:      General: No scleral icterus.  Neck:      Thyroid: No thyroid mass.      Vascular: No carotid bruit or JVD. JVD normal.   Pulmonary:      Effort: Pulmonary effort is normal.      Breath sounds: Normal breath sounds.   Cardiovascular:      Normal rate. Regular rhythm.      Murmurs: There is no murmur.      No gallop.    Edema:     Peripheral edema absent.   Skin:     General: Skin is warm. There is no cyanosis.   Neurological:      General: No focal deficit present.      Mental Status: Alert.   Psychiatric:         Attention and Perception: Attention normal.         Result Review  (reviewed with patient):            Lab Results   Component Value Date    GLUCOSE 216 (H) 07/23/2024    BUN 12 07/23/2024    CREATININE 0.85 07/23/2024     07/23/2024    K 3.9 07/23/2024     07/23/2024    CALCIUM 9.4 07/23/2024    PROTEINTOT 7.7 07/23/2024    ALBUMIN 4.4 07/23/2024    ALT 41 07/23/2024    AST 29 07/23/2024    ALKPHOS 58 07/23/2024    BILITOT 0.3 07/23/2024    GLOB 3.3 07/23/2024    AGRATIO 1.3 07/23/2024    BCR 14.1 07/23/2024    ANIONGAP 13.2 07/23/2024    EGFR 95.2 07/23/2024     Lab Results   Component Value Date    WBC 5.71 07/23/2024    HGB 17.2 07/23/2024    HCT 51.3 (H) 07/23/2024    MCV 88.1 07/23/2024     07/23/2024     Lab Results   Component Value Date    CHOL 206 (H) 07/23/2024    CHLPL 175 10/17/2022    TRIG 89 07/23/2024    HDL 43 07/23/2024     (H) 07/23/2024     Lab Results   Component Value Date "    HGBA1C 8.20 (H) 07/23/2024           Assessment     Diagnoses and all orders for this visit:    1. Coronary artery disease involving native coronary artery of native heart with angina pectoris (Primary)  Overview:  NSTEMI in 2002 with drug-eluting stent to the LAD  NSTEMI 2004 with drug-eluting stent to the left circumflex.  Echocardiogram (10/26/2018): LVEF normal. Mild LVH. Mild MR.   Cardiac catheterization for NSTEMI  (10/26/2018): severe 3-vessel CAD.  LVEF normal.  CABG (10/29/2018): LIMA to LAD, SVG to RCA, sequential SVG to OM and LPL    Assessment & Plan:  No angina  Continue antiplatelet, beta-blocker      2. Essential hypertension  Overview:  Target blood pressure <130/80 mmHg  Tolerate HCTZ due to hypotension    Assessment & Plan:  Uncontrolled  Continue losartan, low-dose HCTZ, and amlodipine  Hopefully blood pressure will be more easily controlled with weight loss on GLP agonist      3. Hyperlipidemia LDL goal <70  Overview:  High intensity statin therapy indicated due to CAD  Historically intolerant to rosuvastatin and atorvastatin    Assessment & Plan:  Start evolocumab  Direct LDL in 3 months      4. Type 2 diabetes mellitus with hyperglycemia, with long-term current use of insulin  Assessment & Plan:  Uncontrolled  Refer to [facility] pharmacy for semaglutide or tirzepatide            Plan   Refer to [facility] pharmacist for initiation of evolocumab and GLP agonist      Follow-up   Return in about 8 months (around 4/27/2025) for Follow-up with cardiology APRN/PA.        Roberto Faust MD, FACC, [facility]  8/27/2024

## 2024-08-26 ENCOUNTER — OFFICE VISIT (OUTPATIENT)
Dept: FAMILY MEDICINE CLINIC | Facility: CLINIC | Age: 69
End: 2024-08-26
Payer: MEDICARE

## 2024-08-26 VITALS
BODY MASS INDEX: 30.01 KG/M2 | WEIGHT: 198 LBS | SYSTOLIC BLOOD PRESSURE: 144 MMHG | OXYGEN SATURATION: 97 % | HEART RATE: 80 BPM | DIASTOLIC BLOOD PRESSURE: 80 MMHG | HEIGHT: 68 IN

## 2024-08-26 DIAGNOSIS — H69.93 EUSTACHIAN TUBE DYSFUNCTION, BILATERAL: ICD-10-CM

## 2024-08-26 DIAGNOSIS — M54.2 NECK PAIN: Primary | ICD-10-CM

## 2024-08-26 DIAGNOSIS — I10 ESSENTIAL HYPERTENSION: Chronic | ICD-10-CM

## 2024-08-26 PROCEDURE — 99214 OFFICE O/P EST MOD 30 MIN: CPT

## 2024-08-26 PROCEDURE — 3077F SYST BP >= 140 MM HG: CPT

## 2024-08-26 PROCEDURE — 1159F MED LIST DOCD IN RCRD: CPT

## 2024-08-26 PROCEDURE — 1126F AMNT PAIN NOTED NONE PRSNT: CPT

## 2024-08-26 PROCEDURE — 3052F HG A1C>EQUAL 8.0%<EQUAL 9.0%: CPT

## 2024-08-26 PROCEDURE — 3079F DIAST BP 80-89 MM HG: CPT

## 2024-08-26 PROCEDURE — 1160F RVW MEDS BY RX/DR IN RCRD: CPT

## 2024-08-26 RX ORDER — FLUTICASONE PROPIONATE 50 MCG
1 SPRAY, SUSPENSION (ML) NASAL DAILY
Qty: 9.9 ML | Refills: 11 | Status: SHIPPED | OUTPATIENT
Start: 2024-08-26

## 2024-08-26 NOTE — PROGRESS NOTES
"    Office Note     Name: Gal Pierce    : 1955     MRN: [id number]     Chief Complaint  Hypertension, Sore Throat (Pt. States he noticed sore throat x 2 days), and Neck Pain    Subjective     History of Present Illness:  Gal Pierce is a 69 y.o. male who presents today for follow-up of blood pressure.  Past medical history includes hyperlipidemia, type 2 diabetes, CAD and hypertension.    HTN: Amlodipine, HCTZ, Losartan and Metoprolol   Does not check his BP at home. Checked once since he saw Dr Tovar his PCP last. It was around 140/80.  Denies chest pain, shortness of breath or dizziness.  Denies visual changes.  Per PCP last visit patient was instructed to \"Add on half tab of HCTZ. Blood pressure dropped to low last time he tried it several years ago. Elevated systolic today 170 and 164 on repeat. May need to discontinue doxazosin if dizziness occurs on HCTZ depending on how his pressure response.\"   Patient states he also gets nervous when he comes to the doctor and wonders if it could just be elevated due to this.    Patient is also complaining of neck tightness on the left side.  Patient states he has tenderness along with the muscle that runs down his neck.  Patient states it hurts when he presses directly on it or turns his neck to the right.  He states it feels like a stretch.  He has not tried to take anything for it.  He denies fevers, chills, numbness or tingling.  Denies ear pain.  Denies dizziness, visual changes, chest pain or shortness of breath.  He denies sore throat or difficulty swallowing.      Review of Systems:   Review of Systems   Constitutional:  Negative for chills and fever.   Respiratory:  Negative for chest tightness and shortness of breath.    Cardiovascular:  Negative for chest pain and palpitations.   Gastrointestinal:  Negative for abdominal pain.   Neurological:  Negative for dizziness and light-headedness.       Past Medical History:   Past Medical " History:   Diagnosis Date    CAD (coronary artery disease)     Diabetes mellitus     Elevated red blood cell count     Heart attack     Hyperlipidemia     Hypertension        Past Surgical History:   Past Surgical History:   Procedure Laterality Date    APPENDECTOMY      CORONARY ARTERY BYPASS GRAFT  10/29/2018    By Osman Slater with LIMA to LAD, SVG to RCA, sequential SVG to OM/LPL       Family History:   Family History   Problem Relation Age of Onset    Diabetes Mother     Aneurysm Mother     Hypertension Father     Diabetes Father     Heart attack Father     Diabetes Sister     No Known Problems Brother     Diabetes Maternal Grandmother     Hypertension Maternal Grandmother     Diabetes Maternal Grandfather     Hypertension Maternal Grandfather     Diabetes Paternal Grandmother     Hypertension Paternal Grandmother     Diabetes Paternal Grandfather     Hypertension Paternal Grandfather     Diabetes Sister     Diabetes Sister     Cancer Sister     Hypertension Brother     Heart disease Brother     Diabetes Brother     Hypertension Brother        Social History:   Social History     Socioeconomic History    Marital status:    Tobacco Use    Smoking status: Former     Current packs/day: 0.00     Average packs/day: 0.3 packs/day for 15.0 years (3.8 ttl pk-yrs)     Types: Cigarettes     Start date:      Quit date:      Years since quittin.6     Passive exposure: Past    Smokeless tobacco: Never    Tobacco comments:     40 years ago   Vaping Use    Vaping status: Never Used   Substance and Sexual Activity    Alcohol use: No    Drug use: No    Sexual activity: Defer       Immunizations:   Immunization History   Administered Date(s) Administered    COVID-19 (MODERNA) 1st,2nd,3rd Dose Monovalent 2021, 2021    FLUAD TRI 65YR+ 2013    Flu Vaccine Quad PF >36MO 10/04/2019    Flu Vaccine Split Quad 2014    Fluzone (or Fluarix & Flulaval for VFC) >6mos 2014, 10/04/2019,  "10/23/2020, 11/30/2021    Fluzone High-Dose 65+yrs 10/05/2022    Influenza Seasonal Injectable 02/14/2013    Influenza, Unspecified 02/14/2013    Pneumococcal Conjugate 13-Valent (PCV13) 08/03/2015    Pneumococcal Polysaccharide (PPSV23) 07/14/2008    Tdap 02/13/2009        Medications:     Current Outpatient Medications:     amLODIPine (NORVASC) 10 MG tablet, Take 1 tablet by mouth Daily., Disp: 90 tablet, Rfl: 3    aspirin 81 MG EC tablet, Take 1 tablet by mouth Daily., Disp: 90 tablet, Rfl: 3    Blood Glucose Monitoring Suppl (Accu-Chek Guide Me) w/Device kit, , Disp: , Rfl:     clopidogrel (PLAVIX) 75 MG tablet, Take 1 tablet by mouth Daily., Disp: 90 tablet, Rfl: 3    glucose blood test strip, 1 each by Other route Daily. Use as instructed, Disp: 100 each, Rfl: 3    glucose monitor monitoring kit, Use 1 each Daily., Disp: 1 each, Rfl: 0    hydroCHLOROthiazide 12.5 MG tablet, Take 0.5 tablets by mouth Daily., Disp: 30 tablet, Rfl: 2    hydroxyurea (Hydrea) 500 MG capsule, Take 1 capsule by mouth Daily., Disp: 30 capsule, Rfl: 2    Insulin Pen Needle (B-D ULTRAFINE III SHORT PEN) 31G X 8 MM misc, Inject 1 each under the skin into the appropriate area as directed Daily., Disp: 100 each, Rfl: 3    losartan (COZAAR) 100 MG tablet, Take 1 tablet by mouth Daily., Disp: 90 tablet, Rfl: 3    metoprolol succinate XL (TOPROL-XL) 100 MG 24 hr tablet, Take 1 tablet by mouth Daily., Disp: 90 tablet, Rfl: 3    Multiple Vitamins-Minerals (CENTRUM SILVER PO), Take  by mouth Daily., Disp: , Rfl:     fluticasone (FLONASE) 50 MCG/ACT nasal spray, 1 spray into the nostril(s) as directed by provider Daily., Disp: 9.9 mL, Rfl: 11    Allergies:   Allergies   Allergen Reactions    Hydrochlorothiazide Other (See Comments)     Pt states it makes his blood pressure \"real low\"    Crestor [Rosuvastatin Calcium] Myalgia    Lipitor [Atorvastatin Calcium] Myalgia    Penicillins Rash       Objective     Vital Signs  /80   Pulse 80   Ht " "172.7 cm (67.99\")   Wt 89.8 kg (198 lb)   SpO2 97%   BMI 30.11 kg/m²   Estimated body mass index is 30.11 kg/m² as calculated from the following:    Height as of this encounter: 172.7 cm (67.99\").    Weight as of this encounter: 89.8 kg (198 lb).           Physical Exam  Vitals reviewed.   Constitutional:       General: He is not in acute distress.     Appearance: Normal appearance.   HENT:      Head: Normocephalic and atraumatic.      Right Ear: Hearing normal. A middle ear effusion is present. Tympanic membrane is bulging. Tympanic membrane is not erythematous.      Left Ear: Hearing normal. A middle ear effusion is present. Tympanic membrane is bulging. Tympanic membrane is not erythematous.   Eyes:      Pupils: Pupils are equal, round, and reactive to light.   Cardiovascular:      Rate and Rhythm: Normal rate and regular rhythm.      Pulses: Normal pulses.      Heart sounds: Normal heart sounds.   Pulmonary:      Effort: Pulmonary effort is normal.      Breath sounds: Normal breath sounds.   Abdominal:      General: Abdomen is flat. Bowel sounds are normal.   Musculoskeletal:         General: Normal range of motion.      Cervical back: Normal range of motion. Tenderness present. No rigidity.   Lymphadenopathy:      Cervical: No cervical adenopathy.   Skin:     General: Skin is warm.   Neurological:      General: No focal deficit present.      Mental Status: He is alert and oriented to person, place, and time.   Psychiatric:         Mood and Affect: Mood normal.          Results:  No results found for this or any previous visit (from the past 24 hour(s)).     Assessment and Plan     Assessment/Plan:  Diagnoses and all orders for this visit:    1. Neck pain (Primary)  -     POCT rapid strep A  -     fluticasone (FLONASE) 50 MCG/ACT nasal spray; 1 spray into the nostril(s) as directed by provider Daily.  Dispense: 9.9 mL; Refill: 11    2. Eustachian tube dysfunction, bilateral  -     fluticasone (FLONASE) 50 " MCG/ACT nasal spray; 1 spray into the nostril(s) as directed by provider Daily.  Dispense: 9.9 mL; Refill: 11    3. Essential hypertension  Overview:  Target blood pressure <130/80 mmHg    Assessment & Plan:  Hypertension is stable and controlled  Continue current treatment regimen.  Dietary sodium restriction.  Regular aerobic exercise.  Ambulatory blood pressure monitoring.  Continue adding in the half tablet of HCTZ.  Discussed with patient would like him to check his blood pressure daily for the next 3 weeks.  Blood pressure was 140/80 upon recheck.  Would like patient to bring back a blood pressure log when he follows up with PCP in 3 weeks.  If your blood pressures consistently running greater than 140/80 recommend reaching out to your PCP sooner.  He verbalized understanding and agreed to this plan.  Blood pressure will be reassessed  3 weeks  .      Believe patient's neck pain is likely muscular as there is point tenderness.  He does also have eustachian tube dysfunction in bilateral ears, but worse on the left side.  No signs of infection today.  Recommend use of Flonase.  Discussed with him that this can be also referred pain from the ear.  No cervical adenopathy felt today.  No concerning signs or symptoms.  Have recommended heat, light stretching and Tylenol or ibuprofen to help with his discomfort.  Discussed with patient that if he begins to develop dizziness, visual changes, chest pain or shortness of breath or near syncopal episodes to return to clinic sooner or the ER.  He verbalized understanding and agreed to this plan.      Follow Up  Return in about 4 weeks (around 9/23/2024) for Recheck bp with pcp .    Louise Shepard PA-C   [facility]

## 2024-08-26 NOTE — ASSESSMENT & PLAN NOTE
Hypertension is stable and controlled  Continue current treatment regimen.  Dietary sodium restriction.  Regular aerobic exercise.  Ambulatory blood pressure monitoring.  Continue adding in the half tablet of HCTZ.  Discussed with patient would like him to check his blood pressure daily for the next 3 weeks.  Blood pressure was 140/80 upon recheck.  Would like patient to bring back a blood pressure log when he follows up with PCP in 3 weeks.  If your blood pressures consistently running greater than 140/80 recommend reaching out to your PCP sooner.  He verbalized understanding and agreed to this plan.  Blood pressure will be reassessed  3 weeks  .

## 2024-08-27 ENCOUNTER — OFFICE VISIT (OUTPATIENT)
Dept: CARDIOLOGY | Facility: CLINIC | Age: 69
End: 2024-08-27
Payer: MEDICARE

## 2024-08-27 ENCOUNTER — SPECIALTY PHARMACY (OUTPATIENT)
Dept: CARDIOLOGY | Facility: CLINIC | Age: 69
End: 2024-08-27
Payer: MEDICARE

## 2024-08-27 VITALS
DIASTOLIC BLOOD PRESSURE: 88 MMHG | SYSTOLIC BLOOD PRESSURE: 150 MMHG | OXYGEN SATURATION: 94 % | HEIGHT: 68 IN | HEART RATE: 81 BPM | WEIGHT: 198 LBS | BODY MASS INDEX: 30.01 KG/M2

## 2024-08-27 DIAGNOSIS — E78.5 HYPERLIPIDEMIA LDL GOAL <70: Chronic | ICD-10-CM

## 2024-08-27 DIAGNOSIS — I10 ESSENTIAL HYPERTENSION: Chronic | ICD-10-CM

## 2024-08-27 DIAGNOSIS — Z79.4 TYPE 2 DIABETES MELLITUS WITH HYPERGLYCEMIA, WITH LONG-TERM CURRENT USE OF INSULIN: ICD-10-CM

## 2024-08-27 DIAGNOSIS — E11.65 TYPE 2 DIABETES MELLITUS WITH HYPERGLYCEMIA, WITH LONG-TERM CURRENT USE OF INSULIN: ICD-10-CM

## 2024-08-27 DIAGNOSIS — I25.119 CORONARY ARTERY DISEASE INVOLVING NATIVE CORONARY ARTERY OF NATIVE HEART WITH ANGINA PECTORIS: Primary | Chronic | ICD-10-CM

## 2024-08-27 PROCEDURE — 3077F SYST BP >= 140 MM HG: CPT | Performed by: INTERNAL MEDICINE

## 2024-08-27 PROCEDURE — 1159F MED LIST DOCD IN RCRD: CPT | Performed by: INTERNAL MEDICINE

## 2024-08-27 PROCEDURE — 3079F DIAST BP 80-89 MM HG: CPT | Performed by: INTERNAL MEDICINE

## 2024-08-27 PROCEDURE — 99214 OFFICE O/P EST MOD 30 MIN: CPT | Performed by: INTERNAL MEDICINE

## 2024-08-27 PROCEDURE — 1160F RVW MEDS BY RX/DR IN RCRD: CPT | Performed by: INTERNAL MEDICINE

## 2024-08-27 NOTE — ASSESSMENT & PLAN NOTE
Uncontrolled  Continue losartan, low-dose HCTZ, and amlodipine  Hopefully blood pressure will be more easily controlled with weight loss on GLP agonist

## 2024-09-16 ENCOUNTER — TELEPHONE (OUTPATIENT)
Dept: GASTROENTEROLOGY | Facility: CLINIC | Age: 69
End: 2024-09-16
Payer: MEDICARE

## 2024-09-16 ENCOUNTER — TELEPHONE (OUTPATIENT)
Dept: FAMILY MEDICINE CLINIC | Facility: CLINIC | Age: 69
End: 2024-09-16
Payer: MEDICARE

## 2024-10-04 RX ORDER — SODIUM, POTASSIUM,MAG SULFATES 17.5-3.13G
SOLUTION, RECONSTITUTED, ORAL ORAL
Qty: 354 ML | Refills: 0 | Status: SHIPPED | OUTPATIENT
Start: 2024-10-04

## 2024-10-31 RX ORDER — SODIUM, POTASSIUM,MAG SULFATES 17.5-3.13G
SOLUTION, RECONSTITUTED, ORAL ORAL
Qty: 354 ML | Refills: 0 | Status: SHIPPED | OUTPATIENT
Start: 2024-10-31

## 2024-11-06 ENCOUNTER — SPECIALTY PHARMACY (OUTPATIENT)
Dept: GENERAL RADIOLOGY | Facility: HOSPITAL | Age: 69
End: 2024-11-06
Payer: MEDICARE

## 2024-11-06 DIAGNOSIS — R97.20 ELEVATED PSA: Primary | ICD-10-CM

## 2024-11-06 NOTE — PROGRESS NOTES
Specialty Pharmacy Patient Management Program  Cardiology Initial Assessment     Gal Pierce was referred by a Cardiology provider to the Cardiology Patient Management program offered by [facility] for Hyperlipidemia on 24.  An initial outreach was conducted, including assessment of therapy appropriateness and specialty medication education for Repatha. The patient was introduced to services offered by [facility], including: regular assessments, refill coordination, mail order delivery options, prior authorization maintenance, and financial assistance programs as applicable. The patient was also provided with contact information for the pharmacy team.     Insurance Coverage & Financial Support  [employer]     Relevant Past Medical History and Comorbidities  Relevant medical history and concomitant health conditions were discussed with the patient. The patient's chart has been reviewed for relevant past medical history and comorbid conditions and updated as necessary.  Past Medical History:   Diagnosis Date    CAD (coronary artery disease)     Diabetes mellitus     Elevated red blood cell count     Heart attack     Hyperlipidemia     Hypertension      Social History     Socioeconomic History    Marital status:    Tobacco Use    Smoking status: Former     Current packs/day: 0.00     Average packs/day: 0.3 packs/day for 15.0 years (3.8 ttl pk-yrs)     Types: Cigarettes     Start date:      Quit date:      Years since quittin.8     Passive exposure: Past    Smokeless tobacco: Never    Tobacco comments:     40 years ago   Vaping Use    Vaping status: Never Used   Substance and Sexual Activity    Alcohol use: No    Drug use: No    Sexual activity: Defer       Problem list reviewed by Yesy Huertas, PharmD on 2024 at  7:45 AM    Allergies  Known allergies and reactions were discussed with the patient. The patient's  "chart has been reviewed for  allergy information and updated as necessary.   Allergies   Allergen Reactions    Hydrochlorothiazide Other (See Comments)     Pt states it makes his blood pressure \"real low\"    Crestor [Rosuvastatin Calcium] Myalgia    Lipitor [Atorvastatin Calcium] Myalgia    Penicillins Rash       Allergies reviewed by Yesy Huertas, PharmD on 11/6/2024 at  7:44 AM    Relevant Laboratory Values  Relevant laboratory values were discussed with the patient. The following specialty medication dose adjustment(s) are recommended: None    Lab Results   Component Value Date    GLUCOSE 216 (H) 07/23/2024    CALCIUM 9.4 07/23/2024     07/23/2024    K 3.9 07/23/2024    CO2 20.8 (L) 07/23/2024     07/23/2024    BUN 12 07/23/2024    CREATININE 0.85 07/23/2024    EGFRIFAFRI 90 08/10/2021    BCR 14.1 07/23/2024    ANIONGAP 13.2 07/23/2024     Lab Results   Component Value Date    CHOL 206 (H) 07/23/2024    CHLPL 175 10/17/2022    TRIG 89 07/23/2024    HDL 43 07/23/2024     (H) 07/23/2024         Current Medication List  This medication list has been reviewed with the patient and evaluated for any interactions or necessary modifications/recommendations, and updated to include all prescription medications, OTC medications, and supplements the patient is currently taking.  This list reflects what is contained in the patient's profile, which has also been marked as reviewed to communicate to other providers it is the most up to date version of the patient's current medication therapy.     Current Outpatient Medications:     amLODIPine (NORVASC) 10 MG tablet, Take 1 tablet by mouth Daily., Disp: 90 tablet, Rfl: 3    aspirin 81 MG EC tablet, Take 1 tablet by mouth Daily., Disp: 90 tablet, Rfl: 3    Blood Glucose Monitoring Suppl (Accu-Chek Guide Me) w/Device kit, , Disp: , Rfl:     clopidogrel (PLAVIX) 75 MG tablet, Take 1 tablet by mouth Daily., Disp: 90 tablet, Rfl: 3    Evolocumab (REPATHA) " solution auto-injector SureClick injection, Inject 1 mL under the skin into the appropriate area as directed Every 14 (Fourteen) Days., Disp: 6 mL, Rfl: 3    fluticasone (FLONASE) 50 MCG/ACT nasal spray, 1 spray into the nostril(s) as directed by provider Daily., Disp: 9.9 mL, Rfl: 11    glucose blood test strip, 1 each by Other route Daily. Use as instructed, Disp: 100 each, Rfl: 3    glucose monitor monitoring kit, Use 1 each Daily., Disp: 1 each, Rfl: 0    hydroCHLOROthiazide 12.5 MG tablet, Take 0.5 tablets by mouth Daily., Disp: 30 tablet, Rfl: 2    hydroxyurea (Hydrea) 500 MG capsule, Take 1 capsule by mouth Daily., Disp: 30 capsule, Rfl: 2    Insulin Pen Needle (B-D ULTRAFINE III SHORT PEN) 31G X 8 MM misc, Inject 1 each under the skin into the appropriate area as directed Daily., Disp: 100 each, Rfl: 3    losartan (COZAAR) 100 MG tablet, Take 1 tablet by mouth Daily., Disp: 90 tablet, Rfl: 3    metoprolol succinate XL (TOPROL-XL) 100 MG 24 hr tablet, Take 1 tablet by mouth Daily., Disp: 90 tablet, Rfl: 3    Multiple Vitamins-Minerals (CENTRUM SILVER PO), Take  by mouth Daily., Disp: , Rfl:     sodium-potassium-magnesium sulfates (Suprep Bowel Prep Kit) 17.5-3.13-1.6 GM/177ML solution oral solution, Use as directed by provider for colonoscopy prep, Disp: 354 mL, Rfl: 0    Medicines reviewed by Yesy Huertas, PharmD on 11/6/2024 at  7:45 AM    Drug Interactions  None    Initial Education Provided for Specialty Medication  The patient has been provided with the following education and any applicable administration techniques (i.e. self-injection) have been demonstrated for the therapies indicated. All questions and concerns have been addressed prior to the patient receiving the medication, and the patient has verbalized comprehension of the education and any materials provided. Additional patient education shall be provided and documented upon request by the patient, provider, or payer.    REPATHA®  (evolocumab)  Medication Expectations   Why am I taking this medication? You are taking Repatha to lower your “bad” cholesterol (LDL-C). This medication can be used in adults with high blood cholesterol including primary hyperlipidemia and familial hypercholesterolemia.    What should I expect while on this medication? You should expect to see your cholesterol improve over time. Specifically, you should see your LDL-C decrease.    How does the medication work? Repatha works by blocking a protein called PCSK9 that contributes to high levels of bad cholesterol. It helps increase your liver's ability to remove bad cholesterol from your blood.     How long will I be on this medication for? The amount of time you will be on this medication will be determined by your doctor based on your cholesterol and/or your risk of having a cardiac event. You will most likely be on this medication or another cholesterol medication throughout your lifetime. Do not abruptly stop this medication without talking to your doctor first.    How do I take this medication? Take as directed on your prescription label. Repatha is injected under the skin (subcutaneously) of your stomach, thigh, or upper arm. This medication is usually given one or twice a month.   What are some possible side effects? The most common side effects of Repatha include redness, itching, swelling, or pain/tenderness at the injection site, symptoms of the common cold, flu or flu-like symptoms or back pain.    What happens if I miss a dose? If you miss a dose, take it as soon as you remember if it is within 7 days from the usual day of administration then resume your original schedule. If it is beyond 7 days and you use the bravo-2-week dose, skip the missed dose and resume your normal dosing schedule.If it is beyond 7 days and you use the once-monthly dose, inject the dose and start a new schedule based on that date.      Medication Safety   What are things I should warn  my doctor immediately about? Talk to your doctor if you are pregnant, planning to become pregnant, or breastfeeding. Stop the medication and tell your doctor or seek emergency medical help if you notice any signs/symptoms of an allergic reaction (severe rash, redness, hives, severe itching, trouble breathing, or swelling of the face, lips, or tongue). If you have a rubber or latex allergy, you should not use the Repatha SureClick® Autoinjector pen or the prefilled syringe, please notify your doctor or pharmacist.   What are things that I should be cautious of? Be cautious of any side effects from this medication. Talk to your doctor if any new ones develop or aren't getting better.   What are some medications that can interact with this one? There are no known significant drug interactions with Repatha. Always tell your doctor or pharmacist immediately if you start taking any new medications, including over-the-counter medications, vitamins, and herbal supplements.      Medication Storage/Handling   How should I handle this medication? Do not shake or expose the pens, cartridges, or syringes to extreme heat or direct sunlight. Keep this medication out of reach of pets/children. Allow medication to warm at room temperature prior to administration.   How does this medication need to be stored? Store unused pens, cartridges, or syringes in the refrigerator in the original cartons to protect from light. If needed, Repatha may be kept at room temperature in the original carton for up to 30 days. Do not freeze.    How should I dispose of this medication? All the Repatha devices are single-dose and should be discarded in a sharps container after use. If your doctor decides to stop this medication, take to your local police station for proper disposal. Some pharmacies also have take-back bins for medication drop-off.      Resources/Support   How can I remind myself to take this medication? You can download reminder apps to  help you manage your refills. You may also set an alarm on your phone to remind you to take your dose.    Is financial support available?  [email or web address] can provide co-pay cards if you have commercial insurance or patient assistance if you have Medicare or no insurance.    Which vaccines are recommended for me? Talk to your doctor about these vaccines: Flu, Coronavirus (COVID-19), Pneumococcal (pneumonia), Tdap, Hepatitis B, Zoster (shingles)          Adherence and Self-Administration  Adherence related to the patient's specialty therapy was discussed with the patient. The Adherence segment of this outreach has been reviewed and updated.     Is there a concern with patient's ability to self administer the medication correctly and without issue?: No  Were any potential barriers to adherence identified during the initial assessment or patient education?: No  Are there any concerns regarding the patient's understanding of the importance of medication adherence?: No  Methods for Supporting Patient Adherence and/or Self-Administration: None    Open Medication Therapy Problems  No medication therapy recommendations to display    Goals of Therapy  Goals related to the patient's specialty therapy were discussed with the patient. The Patient Goals segment of this outreach has been reviewed and updated.   Goals Addressed Today        Specialty Pharmacy General Goal      LDL Goal < 70 mg/dL    Lab Results   Component Value Date     (H) 07/23/2024     (H) 10/17/2022     (H) 07/19/2022     (H) 08/10/2021     (H) 08/10/2020                  Reassessment Plan & Follow-Up  1. Medication Therapy Changes: Begin Repatha 140mg subcutaneous every 14 days  2. Related Plans, Therapy Recommendations, or Therapy Problems to Be Addressed: None  3. Pharmacist to perform regular assessments no more than (6) months from the previous assessment.  4. Care Coordinator to set up future refill outreaches, coordinate  prescription delivery, and escalate clinical questions to pharmacist.  5. Welcome information and patient satisfaction survey to be sent by specialty pharmacy team with patient's initial fill.    Attestation  Therapeutic appropriateness: Appropriate   I attest the patient was actively involved in and has agreed to the above plan of care. If the prescribed therapy is at any point deemed not appropriate based on the current or future assessments, a consultation will be initiated with the patient's specialty care provider to determine the best course of action. The revised plan of therapy will be documented along with any required assessments and/or additional patient education provided.     Yesy Huertas, PharmD, [facility]  Clinical Specialty Pharmacist, Cardiology  11/6/2024  07:46 EST

## 2024-11-07 NOTE — PROGRESS NOTES
Closed our specialty pharmacy enrollment for Ozempic. After speaking with Yesy Huertas, PharmD, dianekandis states he is not interested in pursuing this medication therapy at this time.    Thank you for the referral.    Grace Will [facility]  Pharmacy Care Coordinator  [facility]  Psychiatric Cardiology  [phone]  11/7/2024  10:59 EST

## 2024-11-13 ENCOUNTER — OUTSIDE FACILITY SERVICE (OUTPATIENT)
Dept: GASTROENTEROLOGY | Facility: CLINIC | Age: 69
End: 2024-11-13
Payer: MEDICARE

## 2024-11-13 PROCEDURE — 45385 COLONOSCOPY W/LESION REMOVAL: CPT | Performed by: INTERNAL MEDICINE

## 2024-11-13 PROCEDURE — 88305 TISSUE EXAM BY PATHOLOGIST: CPT | Performed by: INTERNAL MEDICINE

## 2024-11-14 ENCOUNTER — LAB REQUISITION (OUTPATIENT)
Dept: LAB | Facility: HOSPITAL | Age: 69
End: 2024-11-14
Payer: MEDICARE

## 2024-11-14 DIAGNOSIS — Z86.0100 PERSONAL HISTORY OF COLON POLYPS, UNSPECIFIED: ICD-10-CM

## 2024-11-14 DIAGNOSIS — K64.8 OTHER HEMORRHOIDS: ICD-10-CM

## 2024-11-14 DIAGNOSIS — D12.0 BENIGN NEOPLASM OF CECUM: ICD-10-CM

## 2024-11-15 LAB — REF LAB TEST METHOD: NORMAL

## 2025-01-06 ENCOUNTER — TELEPHONE (OUTPATIENT)
Dept: UROLOGY | Facility: CLINIC | Age: 70
End: 2025-01-06
Payer: MEDICARE

## 2025-01-06 NOTE — TELEPHONE ENCOUNTER
Pt scheduled on 1/7 with Dr. Raygoza. Appt was canceled due to office delay in opening. Lvm for patient to contact office to reschedule. Will attempt to contact patient again once office re-opens.

## 2025-01-08 ENCOUNTER — OFFICE VISIT (OUTPATIENT)
Dept: UROLOGY | Facility: CLINIC | Age: 70
End: 2025-01-08
Payer: MEDICARE

## 2025-01-08 VITALS — BODY MASS INDEX: 29.7 KG/M2 | HEIGHT: 68 IN | WEIGHT: 196 LBS

## 2025-01-08 DIAGNOSIS — R97.20 ELEVATED PROSTATE SPECIFIC ANTIGEN (PSA): Primary | ICD-10-CM

## 2025-01-08 PROCEDURE — 1159F MED LIST DOCD IN RCRD: CPT | Performed by: UROLOGY

## 2025-01-08 PROCEDURE — 99204 OFFICE O/P NEW MOD 45 MIN: CPT | Performed by: UROLOGY

## 2025-01-08 PROCEDURE — 1160F RVW MEDS BY RX/DR IN RCRD: CPT | Performed by: UROLOGY

## 2025-01-08 NOTE — PROGRESS NOTES
Elevated PSA Office Visit      Patient Name: Gal Pierce  : 1955   MRN: [id number]     Chief Complaint:  Elevated PSA.    Chief Complaint   Patient presents with    Elevated PSA       Referring Provider: Umer Tovar, *    History of Present Illness: Gal Pierce is a 69 y.o. male who presents today with history of elevated PSA.  Patient presents today to [facility] care.  PSA found to be elevated at 6.1 in 2024.  No prior history of elevated PSA value.  Denies bothersome baseline lower urinary tract symptoms-IPSS 0.  Denies hematuria, history of urinary tract infection.    Subjective      Review of System: Review of Systems   Genitourinary:  Negative for decreased urine volume, difficulty urinating, dysuria, enuresis, flank pain, frequency, hematuria and urgency.      I have reviewed the ROS documented by my clinical staff, updated as appropriate and I agree. Yoni Raygoza MD    Past Medical History:   Past Medical History:   Diagnosis Date    CAD (coronary artery disease)     Diabetes mellitus     Elevated red blood cell count     Heart attack     Hyperlipidemia     Hypertension        Past Surgical History:   Past Surgical History:   Procedure Laterality Date    APPENDECTOMY      CORONARY ARTERY BYPASS GRAFT  10/29/2018    By Osman Slater with LIMA to LAD, SVG to RCA, sequential SVG to OM/LPL       Family History:   Family History   Problem Relation Age of Onset    Diabetes Mother     Aneurysm Mother     Hypertension Father     Diabetes Father     Heart attack Father     Diabetes Sister     No Known Problems Brother     Diabetes Maternal Grandmother     Hypertension Maternal Grandmother     Diabetes Maternal Grandfather     Hypertension Maternal Grandfather     Diabetes Paternal Grandmother     Hypertension Paternal Grandmother     Diabetes Paternal Grandfather     Hypertension Paternal Grandfather     Diabetes Sister     Diabetes Sister     Cancer Sister     Hypertension  Brother     Heart disease Brother     Diabetes Brother     Hypertension Brother        Social History:   Social History     Socioeconomic History    Marital status:    Tobacco Use    Smoking status: Former     Current packs/day: 0.00     Average packs/day: 0.3 packs/day for 15.0 years (3.8 ttl pk-yrs)     Types: Cigarettes     Start date:      Quit date:      Years since quittin.0     Passive exposure: Past    Smokeless tobacco: Never    Tobacco comments:     40 years ago   Vaping Use    Vaping status: Never Used   Substance and Sexual Activity    Alcohol use: No    Drug use: No    Sexual activity: Defer       Medications:     Current Outpatient Medications:     amLODIPine (NORVASC) 10 MG tablet, Take 1 tablet by mouth Daily., Disp: 90 tablet, Rfl: 3    aspirin 81 MG EC tablet, Take 1 tablet by mouth Daily., Disp: 90 tablet, Rfl: 3    Blood Glucose Monitoring Suppl (Accu-Chek Guide Me) w/Device kit, , Disp: , Rfl:     clopidogrel (PLAVIX) 75 MG tablet, Take 1 tablet by mouth Daily., Disp: 90 tablet, Rfl: 3    Evolocumab (REPATHA) solution auto-injector SureClick injection, Inject 1 mL under the skin into the appropriate area as directed Every 14 (Fourteen) Days., Disp: 6 mL, Rfl: 3    fluticasone (FLONASE) 50 MCG/ACT nasal spray, 1 spray into the nostril(s) as directed by provider Daily., Disp: 9.9 mL, Rfl: 11    glucose blood test strip, 1 each by Other route Daily. Use as instructed, Disp: 100 each, Rfl: 3    glucose monitor monitoring kit, Use 1 each Daily., Disp: 1 each, Rfl: 0    hydroCHLOROthiazide 12.5 MG tablet, Take 0.5 tablets by mouth Daily., Disp: 30 tablet, Rfl: 2    hydroxyurea (Hydrea) 500 MG capsule, Take 1 capsule by mouth Daily., Disp: 30 capsule, Rfl: 2    Insulin Pen Needle (B-D ULTRAFINE III SHORT PEN) 31G X 8 MM misc, Inject 1 each under the skin into the appropriate area as directed Daily., Disp: 100 each, Rfl: 3    losartan (COZAAR) 100 MG tablet, Take 1 tablet by mouth  "Daily., Disp: 90 tablet, Rfl: 3    metoprolol succinate XL (TOPROL-XL) 100 MG 24 hr tablet, Take 1 tablet by mouth Daily., Disp: 90 tablet, Rfl: 3    Multiple Vitamins-Minerals (CENTRUM SILVER PO), Take  by mouth Daily., Disp: , Rfl:     sodium-potassium-magnesium sulfates (Suprep Bowel Prep Kit) 17.5-3.13-1.6 GM/177ML solution oral solution, Use as directed by provider for colonoscopy prep, Disp: 354 mL, Rfl: 0    Allergies:   Allergies   Allergen Reactions    Hydrochlorothiazide Other (See Comments)     Pt states it makes his blood pressure \"real low\"    Crestor [Rosuvastatin Calcium] Myalgia    Lipitor [Atorvastatin Calcium] Myalgia    Penicillins Rash       IPSS Questionnaire (AUA-7):  Over the past month…    1)  Incomplete Emptying  How often have you had a sensation of not emptying your bladder?  0 - Not at all   2)  Frequency  How often have you had to urinate less than every two hours? 0 - Not at all   3)  Intermittency  How often have you found you stopped and started again several times when you urinated?  0 - Not at all   4) Urgency  How often have you found it difficult to postpone urination?  0 - Not at all   5) Weak Stream  How often have you had a weak urinary stream?  0 - Not at all   6) Straining  How often have you had to push or strain to begin urination?  0 - Not at all   7) Nocturia  How many times did you typically get up at night to urinate?  0 - None   Total Score:  0       Quality of life due to urinary symptoms:  If you were to spend the rest of your life with your urinary condition the way it is now, how would you feel about that? 1-Pleased   Urine Leakage (Incontinence) 0-No Leakage         Objective     Physical Exam:   Vital Signs:   Vitals:    01/08/25 1102   Weight: 88.9 kg (196 lb)   Height: 172.7 cm (67.99\")     Body mass index is 29.81 kg/m².     Physical Exam  Vitals and nursing note reviewed.   Constitutional:       Appearance: Normal appearance.   HENT:      Head: Normocephalic " and atraumatic.   Cardiovascular:      Comments: Well perfused  Pulmonary:      Effort: Pulmonary effort is normal.   Abdominal:      General: Abdomen is flat.      Palpations: Abdomen is soft.   Musculoskeletal:         General: Normal range of motion.   Skin:     General: Skin is warm and dry.   Neurological:      General: No focal deficit present.      Mental Status: He is alert and oriented to person, place, and time. Mental status is at baseline.   Psychiatric:         Mood and Affect: Mood normal.         Behavior: Behavior normal.         Thought Content: Thought content normal.         Judgment: Judgment normal.         Labs:   Hematocrit (%)   Date Value   07/23/2024 51.3 (H)   10/17/2022 53.2 (H)   10/17/2022 51.0   08/02/2022 52.8 (H)   06/27/2022 55.9 (H)   03/22/2022 45.2   12/28/2021 48.6       Lab Results   Component Value Date    PSA 6.170 (H) 07/23/2024    PSA 4.220 (H) 10/17/2022    PSA 3.940 08/10/2021       Images:   No Images in the past 120 days found..    Measures:   Tobacco:   Gal Pierce  reports that he quit smoking about 35 years ago. His smoking use included cigarettes. He started smoking about 40 years ago. He has a 3.8 pack-year smoking history. He has been exposed to tobacco smoke. He has never used smokeless tobacco. I have educated him on the risk of diseases from using tobacco product.       Assessment / Plan      Assessment:     Mr. Pierce is a 69 y.o. male with elevated PSA.  Most recent PSA 6.1.  He has had elevating trend but no prior significant elevation of PSA value.  Current IPSS 0.    Diagnoses and all orders for this visit:    1. Elevated prostate specific antigen (PSA) (Primary)  -     PSA Total+% Free (Serial); Future           Patient Education:   Today I discussed with the patient the etiology and management of elevated PSA.  Discussed that PSA is a protein used as a marker for prostate cancer screening. We discussed in depth the role for prostate cancer  screening.   We discussed that over 90% of prostate cancers are detected by screening.  We discussed that screening means a test performed on an asymptomatic patient to detect cancer at an earlier point in time.  We discussed the role of screening which includes both PSA and PHILLIP.  We discussed the harms of prostate cancer screening including potential overdiagnosis and overtreatment.  Discussed the benefits of screening including diagnosis of cancer or lower staging grade.  Discussed that prostate imaging is not recommended for prostate cancer screening.  Discussed that screening should be offered to him in of an appropriate age to have a life expectancy more than 10 years.  Discussed that PSA is not capable of diagnosing prostate cancer.  We discussed that a biopsy is indicated if PSA is elevated as a confirmation of cancer.  Discussed the decision to perform a biopsy is often based on many criteria not just a total PSA value.  Discussed that a single abnormal PSA should not prompt biopsy.  Abnormal PSA level should be verified after a few weeks.  We discussed the possible etiologies that can result in an elevated PSA test other test other than cancer.   I evaluated his PSA which was elevated at 6.1..  He does not have another  recent comparison value.  I discussed that at this time I would like to repeat his PSA to ensure its exact value.  PSA remains elevated, increasing trend we will further discuss adjunct testing such as 4K score, MDX, multiparametric MRI.    Follow Up:   Return in about 2 weeks (around 1/22/2025) for Recheck.    I spent approximately 45 minutes providing clinical care for this patient; including review of patient's chart and provider documentation, face to face time spent with patient in examination room (obtaining history, performing physical exam, discussing diagnosis and management options), placing orders, and completing patient documentation.     Yoni Raygoza MD  [facility] [place]

## 2025-01-13 PROBLEM — R97.20 ELEVATED PROSTATE SPECIFIC ANTIGEN (PSA): Status: ACTIVE | Noted: 2025-01-13

## 2025-01-14 ENCOUNTER — LAB (OUTPATIENT)
Dept: LAB | Facility: HOSPITAL | Age: 70
End: 2025-01-14
Payer: MEDICARE

## 2025-01-14 DIAGNOSIS — E55.9 VITAMIN D DEFICIENCY, UNSPECIFIED: ICD-10-CM

## 2025-01-14 DIAGNOSIS — Z00.00 PREVENTATIVE HEALTH CARE: ICD-10-CM

## 2025-01-14 DIAGNOSIS — Z12.5 ENCOUNTER FOR PROSTATE CANCER SCREENING: ICD-10-CM

## 2025-01-14 DIAGNOSIS — R97.20 ELEVATED PROSTATE SPECIFIC ANTIGEN (PSA): ICD-10-CM

## 2025-01-14 LAB
25(OH)D3 SERPL-MCNC: 26.8 NG/ML (ref 30–100)
ALBUMIN SERPL-MCNC: 4.2 G/DL (ref 3.5–5.2)
ALBUMIN/GLOB SERPL: 1.3 G/DL
ALP SERPL-CCNC: 72 U/L (ref 39–117)
ALT SERPL W P-5'-P-CCNC: 37 U/L (ref 1–41)
ANION GAP SERPL CALCULATED.3IONS-SCNC: 13 MMOL/L (ref 5–15)
AST SERPL-CCNC: 26 U/L (ref 1–40)
BASOPHILS # BLD AUTO: 0.07 10*3/MM3 (ref 0–0.2)
BASOPHILS NFR BLD AUTO: 1.2 % (ref 0–1.5)
BILIRUB SERPL-MCNC: 0.3 MG/DL (ref 0–1.2)
BUN SERPL-MCNC: 10 MG/DL (ref 8–23)
BUN/CREAT SERPL: 11.9 (ref 7–25)
CALCIUM SPEC-SCNC: 9.2 MG/DL (ref 8.6–10.5)
CHLORIDE SERPL-SCNC: 100 MMOL/L (ref 98–107)
CHOLEST SERPL-MCNC: 219 MG/DL (ref 0–200)
CO2 SERPL-SCNC: 22 MMOL/L (ref 22–29)
CREAT SERPL-MCNC: 0.84 MG/DL (ref 0.76–1.27)
DEPRECATED RDW RBC AUTO: 44.2 FL (ref 37–54)
EGFRCR SERPLBLD CKD-EPI 2021: 95.6 ML/MIN/1.73
EOSINOPHIL # BLD AUTO: 0.17 10*3/MM3 (ref 0–0.4)
EOSINOPHIL NFR BLD AUTO: 3 % (ref 0.3–6.2)
ERYTHROCYTE [DISTWIDTH] IN BLOOD BY AUTOMATED COUNT: 14 % (ref 12.3–15.4)
GLOBULIN UR ELPH-MCNC: 3.3 GM/DL
GLUCOSE SERPL-MCNC: 302 MG/DL (ref 65–99)
HBA1C MFR BLD: 11.4 % (ref 4.8–5.6)
HCT VFR BLD AUTO: 49.9 % (ref 37.5–51)
HDLC SERPL-MCNC: 38 MG/DL (ref 40–60)
HGB BLD-MCNC: 16.9 G/DL (ref 13–17.7)
IMM GRANULOCYTES # BLD AUTO: 0.01 10*3/MM3 (ref 0–0.05)
IMM GRANULOCYTES NFR BLD AUTO: 0.2 % (ref 0–0.5)
LDLC SERPL CALC-MCNC: 162 MG/DL (ref 0–100)
LDLC/HDLC SERPL: 4.21 {RATIO}
LYMPHOCYTES # BLD AUTO: 2.78 10*3/MM3 (ref 0.7–3.1)
LYMPHOCYTES NFR BLD AUTO: 49.1 % (ref 19.6–45.3)
MCH RBC QN AUTO: 29.6 PG (ref 26.6–33)
MCHC RBC AUTO-ENTMCNC: 33.9 G/DL (ref 31.5–35.7)
MCV RBC AUTO: 87.5 FL (ref 79–97)
MONOCYTES # BLD AUTO: 0.44 10*3/MM3 (ref 0.1–0.9)
MONOCYTES NFR BLD AUTO: 7.8 % (ref 5–12)
NEUTROPHILS NFR BLD AUTO: 2.19 10*3/MM3 (ref 1.7–7)
NEUTROPHILS NFR BLD AUTO: 38.7 % (ref 42.7–76)
NRBC BLD AUTO-RTO: 0 /100 WBC (ref 0–0.2)
PLATELET # BLD AUTO: 444 10*3/MM3 (ref 140–450)
PMV BLD AUTO: 9.9 FL (ref 6–12)
POTASSIUM SERPL-SCNC: 3.9 MMOL/L (ref 3.5–5.2)
PROT SERPL-MCNC: 7.5 G/DL (ref 6–8.5)
PSA SERPL-MCNC: 7.03 NG/ML (ref 0–4)
RBC # BLD AUTO: 5.7 10*6/MM3 (ref 4.14–5.8)
SODIUM SERPL-SCNC: 135 MMOL/L (ref 136–145)
T4 FREE SERPL-MCNC: 1.22 NG/DL (ref 0.92–1.68)
TRIGL SERPL-MCNC: 106 MG/DL (ref 0–150)
TSH SERPL DL<=0.05 MIU/L-ACNC: 0.5 UIU/ML (ref 0.27–4.2)
VLDLC SERPL-MCNC: 19 MG/DL (ref 5–40)
WBC NRBC COR # BLD AUTO: 5.66 10*3/MM3 (ref 3.4–10.8)

## 2025-01-14 PROCEDURE — 36415 COLL VENOUS BLD VENIPUNCTURE: CPT

## 2025-01-14 PROCEDURE — 83036 HEMOGLOBIN GLYCOSYLATED A1C: CPT

## 2025-01-14 PROCEDURE — 80061 LIPID PANEL: CPT

## 2025-01-14 PROCEDURE — G0103 PSA SCREENING: HCPCS

## 2025-01-14 PROCEDURE — 82306 VITAMIN D 25 HYDROXY: CPT

## 2025-01-14 PROCEDURE — 80053 COMPREHEN METABOLIC PANEL: CPT

## 2025-01-14 PROCEDURE — 84443 ASSAY THYROID STIM HORMONE: CPT

## 2025-01-14 PROCEDURE — 84439 ASSAY OF FREE THYROXINE: CPT

## 2025-01-14 PROCEDURE — 84153 ASSAY OF PSA TOTAL: CPT

## 2025-01-14 PROCEDURE — 84154 ASSAY OF PSA FREE: CPT

## 2025-01-14 PROCEDURE — 85025 COMPLETE CBC W/AUTO DIFF WBC: CPT

## 2025-01-15 LAB
PSA FREE MFR SERPL: 15 %
PSA FREE SERPL-MCNC: 1.17 NG/ML
PSA SERPL-MCNC: 7.8 NG/ML (ref 0–4)

## 2025-01-22 ENCOUNTER — OFFICE VISIT (OUTPATIENT)
Dept: UROLOGY | Facility: CLINIC | Age: 70
End: 2025-01-22
Payer: MEDICARE

## 2025-01-22 VITALS — WEIGHT: 196 LBS | BODY MASS INDEX: 29.7 KG/M2 | HEIGHT: 68 IN

## 2025-01-22 DIAGNOSIS — R97.20 ELEVATED PROSTATE SPECIFIC ANTIGEN (PSA): Primary | ICD-10-CM

## 2025-01-22 PROCEDURE — 99214 OFFICE O/P EST MOD 30 MIN: CPT | Performed by: UROLOGY

## 2025-01-22 PROCEDURE — 1160F RVW MEDS BY RX/DR IN RCRD: CPT | Performed by: UROLOGY

## 2025-01-22 PROCEDURE — 1159F MED LIST DOCD IN RCRD: CPT | Performed by: UROLOGY

## 2025-01-22 NOTE — PROGRESS NOTES
Elevated PSA Office Visit      Patient Name: Gal Pierce  : 1955   MRN: [id number]     Chief Complaint:  Elevated PSA.    Chief Complaint   Patient presents with    Elevated PSA         History of Present Illness: Gal Pierce is a 69 y.o. male who presents today with history of elevated PSA.  Patient establish care in 2025 for elevated PSA value of 6.1.  No recent or available comparison values so he presents today after a recheck PSA was obtained.  Current PSA has increased to 7.8.  Minimal lower urinary tract symptoms-IPSS is 0.    Subjective      Review of System: Review of Systems   Genitourinary:  Negative for decreased urine volume, difficulty urinating, dysuria, enuresis, flank pain, frequency, hematuria and urgency.      I have reviewed the ROS documented by my clinical staff, updated as appropriate and I agree. Yoni Raygoza MD    Past Medical History:   Past Medical History:   Diagnosis Date    CAD (coronary artery disease)     Diabetes mellitus     Elevated red blood cell count     Heart attack     Hyperlipidemia     Hypertension        Past Surgical History:   Past Surgical History:   Procedure Laterality Date    APPENDECTOMY      CORONARY ARTERY BYPASS GRAFT  10/29/2018    By Osman Slater with LIMA to LAD, SVG to RCA, sequential SVG to OM/LPL       Family History:   Family History   Problem Relation Age of Onset    Diabetes Mother     Aneurysm Mother     Hypertension Father     Diabetes Father     Heart attack Father     Diabetes Sister     No Known Problems Brother     Diabetes Maternal Grandmother     Hypertension Maternal Grandmother     Diabetes Maternal Grandfather     Hypertension Maternal Grandfather     Diabetes Paternal Grandmother     Hypertension Paternal Grandmother     Diabetes Paternal Grandfather     Hypertension Paternal Grandfather     Diabetes Sister     Diabetes Sister     Cancer Sister     Hypertension Brother     Heart disease Brother     Diabetes  Brother     Hypertension Brother        Social History:   Social History     Socioeconomic History    Marital status:    Tobacco Use    Smoking status: Former     Current packs/day: 0.00     Average packs/day: 0.3 packs/day for 15.0 years (3.8 ttl pk-yrs)     Types: Cigarettes     Start date:      Quit date:      Years since quittin.0     Passive exposure: Past    Smokeless tobacco: Never    Tobacco comments:     40 years ago   Vaping Use    Vaping status: Never Used   Substance and Sexual Activity    Alcohol use: No    Drug use: No    Sexual activity: Defer       Medications:     Current Outpatient Medications:     amLODIPine (NORVASC) 10 MG tablet, Take 1 tablet by mouth Daily., Disp: 90 tablet, Rfl: 3    aspirin 81 MG EC tablet, Take 1 tablet by mouth Daily., Disp: 90 tablet, Rfl: 3    Blood Glucose Monitoring Suppl (Accu-Chek Guide Me) w/Device kit, , Disp: , Rfl:     clopidogrel (PLAVIX) 75 MG tablet, Take 1 tablet by mouth Daily., Disp: 90 tablet, Rfl: 3    Evolocumab (REPATHA) solution auto-injector SureClick injection, Inject 1 mL under the skin into the appropriate area as directed Every 14 (Fourteen) Days., Disp: 6 mL, Rfl: 3    fluticasone (FLONASE) 50 MCG/ACT nasal spray, 1 spray into the nostril(s) as directed by provider Daily., Disp: 9.9 mL, Rfl: 11    glucose blood test strip, 1 each by Other route Daily. Use as instructed, Disp: 100 each, Rfl: 3    glucose monitor monitoring kit, Use 1 each Daily., Disp: 1 each, Rfl: 0    hydroCHLOROthiazide 12.5 MG tablet, Take 0.5 tablets by mouth Daily., Disp: 30 tablet, Rfl: 2    hydroxyurea (Hydrea) 500 MG capsule, Take 1 capsule by mouth Daily., Disp: 30 capsule, Rfl: 2    Insulin Pen Needle (B-D ULTRAFINE III SHORT PEN) 31G X 8 MM misc, Inject 1 each under the skin into the appropriate area as directed Daily., Disp: 100 each, Rfl: 3    losartan (COZAAR) 100 MG tablet, Take 1 tablet by mouth Daily., Disp: 90 tablet, Rfl: 3    metoprolol  "succinate XL (TOPROL-XL) 100 MG 24 hr tablet, Take 1 tablet by mouth Daily., Disp: 90 tablet, Rfl: 3    Multiple Vitamins-Minerals (CENTRUM SILVER PO), Take  by mouth Daily., Disp: , Rfl:     sodium-potassium-magnesium sulfates (Suprep Bowel Prep Kit) 17.5-3.13-1.6 GM/177ML solution oral solution, Use as directed by provider for colonoscopy prep, Disp: 354 mL, Rfl: 0    Allergies:   Allergies   Allergen Reactions    Hydrochlorothiazide Other (See Comments)     Pt states it makes his blood pressure \"real low\"    Crestor [Rosuvastatin Calcium] Myalgia    Lipitor [Atorvastatin Calcium] Myalgia    Penicillins Rash       IPSS Questionnaire (AUA-7):  Over the past month…    1)  Incomplete Emptying  How often have you had a sensation of not emptying your bladder?  0 - Not at all   2)  Frequency  How often have you had to urinate less than every two hours? 0 - Not at all   3)  Intermittency  How often have you found you stopped and started again several times when you urinated?  0 - Not at all   4) Urgency  How often have you found it difficult to postpone urination?  0 - Not at all   5) Weak Stream  How often have you had a weak urinary stream?  0 - Not at all   6) Straining  How often have you had to push or strain to begin urination?  0 - Not at all   7) Nocturia  How many times did you typically get up at night to urinate?  0 - None   Total Score:  0       Quality of life due to urinary symptoms:  If you were to spend the rest of your life with your urinary condition the way it is now, how would you feel about that? 1-Pleased   Urine Leakage (Incontinence) 0-No Leakage         Objective     Physical Exam:   Vital Signs:   Vitals:    01/22/25 0928   Weight: 88.9 kg (196 lb)   Height: 172.7 cm (67.99\")     Body mass index is 29.81 kg/m².     Physical Exam  Vitals and nursing note reviewed.   Constitutional:       Appearance: Normal appearance.   HENT:      Head: Normocephalic and atraumatic.   Cardiovascular:      " Comments: Well perfused  Pulmonary:      Effort: Pulmonary effort is normal.   Abdominal:      General: Abdomen is flat.      Palpations: Abdomen is soft.   Musculoskeletal:         General: Normal range of motion.   Skin:     General: Skin is warm and dry.   Neurological:      General: No focal deficit present.      Mental Status: He is alert and oriented to person, place, and time. Mental status is at baseline.   Psychiatric:         Mood and Affect: Mood normal.         Behavior: Behavior normal.         Thought Content: Thought content normal.         Judgment: Judgment normal.             Labs:   Hematocrit (%)   Date Value   01/14/2025 49.9   07/23/2024 51.3 (H)   10/17/2022 53.2 (H)   10/17/2022 51.0   08/02/2022 52.8 (H)   06/27/2022 55.9 (H)   03/22/2022 45.2       Lab Results   Component Value Date    PSA 7.030 (H) 01/14/2025    PSA 7.8 (H) 01/14/2025    PSA 6.170 (H) 07/23/2024       Images:   No Images in the past 120 days found..    Measures:   Tobacco:   Gal Pierce  reports that he quit smoking about 35 years ago. His smoking use included cigarettes. He started smoking about 40 years ago. He has a 3.8 pack-year smoking history. He has been exposed to tobacco smoke. He has never used smokeless tobacco. I have educated him on the risk of diseases from using tobacco product    Assessment / Plan      Assessment:     Mr. Pierce is a 69 y.o. male with elevated PSA.  Patient is having increasing PSA trend.  Most recent PSA 7.8, increased from value of 6.1 in 7/2024.  Given increasing PSA trend we have discussed the indication for further testing including multiparametric MRI.  We will obtain imaging further management pending imaging findings.  He is understanding and agreeable with indication for imaging and further management pending study findings    Diagnoses and all orders for this visit:    1. Elevated prostate specific antigen (PSA) (Primary)  -     MRI Prostate With & Without Contrast;  Future         Follow Up:   Return in about 3 weeks (around 2/12/2025).    I spent approximately 30 minutes providing clinical care for this patient; including review of patient's chart and provider documentation, face to face time spent with patient in examination room (obtaining history, performing physical exam, discussing diagnosis and management options), placing orders, and completing patient documentation.     Yoni Raygoza MD  [facility]

## 2025-01-27 ENCOUNTER — SPECIALTY PHARMACY (OUTPATIENT)
Dept: CARDIOLOGY | Facility: CLINIC | Age: 70
End: 2025-01-27
Payer: MEDICARE

## 2025-01-27 NOTE — PROGRESS NOTES
Specialty Pharmacy Refill Coordination Note     Gal is a 69 y.o. male contacted today regarding refills of Repatha 140 mg SC every 14 days specialty medication(s).    Patient has missed several doses. He is unable to give an accurate account of how many. Have escalated to pharmacist.    Specialty medication(s) and dose(s) confirmed: yes    Refill Questions      Flowsheet Row Most Recent Value   Changes to allergies? No   Changes to medications? No   New conditions or infections since last clinic visit No   Unplanned office visit, urgent care, ED, or hospital admission in the last 4 weeks  No   How does patient/caregiver feel medication is working? Very good   Financial problems or insurance changes  No   Since the previous refill, were any specialty medication doses or scheduled injections missed or delayed?  Yes   If yes, please provide the amount at least 4 maybe up to 6 (?) says last dose was 'couple days ago'   Why were doses missed? patient forgets to take dose, suggested marking it on his calendar at home   Does this patient require a clinical escalation to a pharmacist? Yes            Delivery Questions      Flowsheet Row Most Recent Value   Delivery method [employer]   Delivery address verified with patient/caregiver? Yes   Delivery address Home   Number of medications in delivery 1   Medication(s) being filled and delivered Evolocumab (REPATHA)   Doses left of specialty medications at least 4 (?)   Copay verified? Yes   Copay amount 0.00   Copay form of payment No copayment ($0)   Ship Date 1/27/25   Delivery Date Selection 01/28/25   Signature Required No                   Follow-up: 84 day(s)     Grace Will, Pharmacy Technician  Specialty Pharmacy Technician

## 2025-01-29 ENCOUNTER — OFFICE VISIT (OUTPATIENT)
Dept: FAMILY MEDICINE CLINIC | Facility: CLINIC | Age: 70
End: 2025-01-29
Payer: MEDICARE

## 2025-01-29 VITALS
DIASTOLIC BLOOD PRESSURE: 92 MMHG | SYSTOLIC BLOOD PRESSURE: 150 MMHG | HEIGHT: 68 IN | HEART RATE: 78 BPM | WEIGHT: 199.4 LBS | BODY MASS INDEX: 30.22 KG/M2 | OXYGEN SATURATION: 98 %

## 2025-01-29 DIAGNOSIS — E11.65 TYPE 2 DIABETES MELLITUS WITH HYPERGLYCEMIA, WITH LONG-TERM CURRENT USE OF INSULIN: Primary | ICD-10-CM

## 2025-01-29 DIAGNOSIS — I10 ESSENTIAL HYPERTENSION: ICD-10-CM

## 2025-01-29 DIAGNOSIS — Z79.4 TYPE 2 DIABETES MELLITUS WITH HYPERGLYCEMIA, WITH LONG-TERM CURRENT USE OF INSULIN: Primary | ICD-10-CM

## 2025-01-29 DIAGNOSIS — R21 FACIAL RASH: ICD-10-CM

## 2025-01-29 PROCEDURE — 1126F AMNT PAIN NOTED NONE PRSNT: CPT | Performed by: INTERNAL MEDICINE

## 2025-01-29 PROCEDURE — 3046F HEMOGLOBIN A1C LEVEL >9.0%: CPT | Performed by: INTERNAL MEDICINE

## 2025-01-29 PROCEDURE — 1159F MED LIST DOCD IN RCRD: CPT | Performed by: INTERNAL MEDICINE

## 2025-01-29 PROCEDURE — 3080F DIAST BP >= 90 MM HG: CPT | Performed by: INTERNAL MEDICINE

## 2025-01-29 PROCEDURE — 99214 OFFICE O/P EST MOD 30 MIN: CPT | Performed by: INTERNAL MEDICINE

## 2025-01-29 PROCEDURE — 1160F RVW MEDS BY RX/DR IN RCRD: CPT | Performed by: INTERNAL MEDICINE

## 2025-01-29 PROCEDURE — 3077F SYST BP >= 140 MM HG: CPT | Performed by: INTERNAL MEDICINE

## 2025-01-29 NOTE — PROGRESS NOTES
Chief Complaint   Patient presents with    Rash     Rash on face, noticed 3 weeks ago. Starting to spread.     Foot Swelling       HPI:  Gal Pierce is a 69 y.o. male who presents today for follow-up of facial rash and foot swelling.    ROS:  Constitutional: no fevers, night sweats or unexplained weight loss  Eyes: no vision changes  ENT: no runny nose, ear pain, sore throat  Cardio: no chest pain, palpitations  Pulm: no shortness of breath, wheezing, or cough  GI: no abdominal pain or changes in bowel movements  : no difficulty urinating  MSK: no difficulty ambulating, no joint pain  Neuro: no weakness, dizziness or headache  Psych: no trouble sleeping  Endo: no change in appetite      Past Medical History:   Diagnosis Date    CAD (coronary artery disease)     Diabetes mellitus     Elevated red blood cell count     Heart attack     Hyperlipidemia     Hypertension       Family History   Problem Relation Age of Onset    Diabetes Mother     Aneurysm Mother     Hypertension Father     Diabetes Father     Heart attack Father     Diabetes Sister     No Known Problems Brother     Diabetes Maternal Grandmother     Hypertension Maternal Grandmother     Diabetes Maternal Grandfather     Hypertension Maternal Grandfather     Diabetes Paternal Grandmother     Hypertension Paternal Grandmother     Diabetes Paternal Grandfather     Hypertension Paternal Grandfather     Diabetes Sister     Diabetes Sister     Cancer Sister     Hypertension Brother     Heart disease Brother     Diabetes Brother     Hypertension Brother       Social History     Socioeconomic History    Marital status:    Tobacco Use    Smoking status: Former     Current packs/day: 0.00     Average packs/day: 0.3 packs/day for 15.0 years (3.8 ttl pk-yrs)     Types: Cigarettes     Start date:      Quit date:      Years since quittin.1     Passive exposure: Past    Smokeless tobacco: Never    Tobacco comments:     40 years ago   Vaping  "Use    Vaping status: Never Used   Substance and Sexual Activity    Alcohol use: No    Drug use: No    Sexual activity: Defer      Allergies   Allergen Reactions    Hydrochlorothiazide Other (See Comments)     Pt states it makes his blood pressure \"real low\"    Crestor [Rosuvastatin Calcium] Myalgia    Lipitor [Atorvastatin Calcium] Myalgia    Penicillins Rash      Immunization History   Administered Date(s) Administered    COVID-19 (MODERNA) 1st,2nd,3rd Dose Monovalent 07/16/2021, 08/17/2021    FLUAD TRI 65YR+ 02/14/2013    Flu Vaccine Quad PF >36MO 10/04/2019    Flu Vaccine Split Quad 11/26/2014    Fluzone (or Fluarix & Flulaval for VFC) >6mos 11/26/2014, 10/04/2019, 10/23/2020, 11/30/2021    Fluzone High-Dose 65+yrs 10/05/2022    Influenza Seasonal Injectable 02/14/2013    Influenza, Unspecified 02/14/2013    Pneumococcal Conjugate 13-Valent (PCV13) 08/03/2015    Pneumococcal Polysaccharide (PPSV23) 07/14/2008    Tdap 02/13/2009        PE:  Vitals:    01/29/25 1314   BP: 150/92   Pulse: 78   SpO2: 98%      Body mass index is 30.33 kg/m².    Gen Appearance: NAD  HEENT: Normocephalic, PERRLA, no thyromegaly, trache midline  Heart: RRR, normal S1 and S2, no murmur  Lungs: CTA b/l, no wheezing, no crackles  Abdomen: Soft, non-tender, non-distended, no guarding and BSx4  MSK: Moves all extremities well, normal gait, no peripheral edema  Pulses: Palpable and equal b/l  Lymph nodes: No palpable lymphadenopathy   Neuro: No focal deficits      Current Outpatient Medications   Medication Sig Dispense Refill    amLODIPine (NORVASC) 10 MG tablet Take 1 tablet by mouth Daily. 90 tablet 3    aspirin 81 MG EC tablet Take 1 tablet by mouth Daily. 90 tablet 3    Blood Glucose Monitoring Suppl (Accu-Chek Guide Me) w/Device kit       clopidogrel (PLAVIX) 75 MG tablet Take 1 tablet by mouth Daily. 90 tablet 3    Evolocumab (REPATHA) solution auto-injector SureClick injection Inject 1 mL under the skin into the appropriate area as " directed Every 14 (Fourteen) Days. 6 mL 3    fluticasone (FLONASE) 50 MCG/ACT nasal spray 1 spray into the nostril(s) as directed by provider Daily. 9.9 mL 11    glucose blood test strip 1 each by Other route Daily. Use as instructed 100 each 3    glucose monitor monitoring kit Use 1 each Daily. 1 each 0    hydroCHLOROthiazide 12.5 MG tablet Take 0.5 tablets by mouth Daily. 30 tablet 2    hydroxyurea (Hydrea) 500 MG capsule Take 1 capsule by mouth Daily. 30 capsule 2    Insulin Pen Needle (B-D ULTRAFINE III SHORT PEN) 31G X 8 MM misc Inject 1 each under the skin into the appropriate area as directed Daily. 100 each 3    losartan (COZAAR) 100 MG tablet Take 1 tablet by mouth Daily. 90 tablet 3    metoprolol succinate XL (TOPROL-XL) 100 MG 24 hr tablet Take 1 tablet by mouth Daily. 90 tablet 3    Multiple Vitamins-Minerals (CENTRUM SILVER PO) Take  by mouth Daily.      sodium-potassium-magnesium sulfates (Suprep Bowel Prep Kit) 17.5-3.13-1.6 GM/177ML solution oral solution Use as directed by provider for colonoscopy prep 354 mL 0    empagliflozin (Jardiance) 10 MG tablet tablet Take 1 tablet by mouth Daily. 90 tablet 3     No current facility-administered medications for this visit.      Recent A1c uncontrolled at 11%, recommend fasting blood sugars and follow-up in 2 weeks for recheck.  Currently taking 100 units of Lantus nightly.  Add on Jardiance 10 mg.  Unable to tolerate metformin in the past.    Suspect rash may be due to hyperglycemia.    BP elevated, recommend home blood pressure checks, recheck in 2 weeks.    Diagnoses and all orders for this visit:    1. Type 2 diabetes mellitus with hyperglycemia, with long-term current use of insulin (Primary)  -     empagliflozin (Jardiance) 10 MG tablet tablet; Take 1 tablet by mouth Daily.  Dispense: 90 tablet; Refill: 3    2. Facial rash    3. Essential hypertension         Return in about 2 weeks (around 2/12/2025).     Dictated Utilizing Dragon Dictation    Please  note that portions of this note were completed with a voice recognition program.    Part of this note may be an electronic transcription/translation of spoken language to printed text using the Dragon Dictation System.

## 2025-01-30 NOTE — PROGRESS NOTES
Specialty Pharmacy Patient Management Program  One-Time Clinical Outreach     Major Charles Pierce is a 69 y.o. male seen by a Cardiology provider for Hyperlipidemia and enrolled in the Cardiology Patient Management program offered by [facility].      Open Medication Therapy Problems  Hyperlipidemia LDL goal <70   1 Current Medication: Evolocumab (REPATHA) solution auto-injector SureClick injection   Current Medication Sig: Inject 1 mL under the skin into the appropriate area as directed Every 14 (Fourteen) Days.   Rationale: Patient forgets to take - Adherence - Adherence   Recommendation: Provide Education   Identified Date: 1/27/2025   Note: 01/30/25  Problem: Patient forgets to take medications  Communication: Patient reports that he has missed anywhere from 4-6 doses Recommendation: Provided education to improve compliance including using a calendar to help him remember dates  Follow-up: 1 month             Yesy Huertas, PharmD, [facility]  Clinical Specialty Pharmacist, Cardiology  1/30/2025  10:32 EST

## 2025-02-08 ENCOUNTER — HOSPITAL ENCOUNTER (OUTPATIENT)
Dept: MRI IMAGING | Facility: HOSPITAL | Age: 70
Discharge: HOME | End: 2025-02-08
Payer: MEDICARE

## 2025-02-08 DIAGNOSIS — R97.20 ELEVATED PROSTATE SPECIFIC ANTIGEN (PSA): ICD-10-CM

## 2025-02-08 PROCEDURE — 72197 MRI PELVIS W/O & W/DYE: CPT

## 2025-02-08 PROCEDURE — A9577 INJ MULTIHANCE: HCPCS | Performed by: UROLOGY

## 2025-02-08 PROCEDURE — 25510000002 GADOBENATE DIMEGLUMINE 529 MG/ML SOLUTION: Performed by: UROLOGY

## 2025-02-08 RX ADMIN — GADOBENATE DIMEGLUMINE 18 ML: 529 INJECTION, SOLUTION INTRAVENOUS at 09:33

## 2025-02-13 ENCOUNTER — OFFICE VISIT (OUTPATIENT)
Dept: FAMILY MEDICINE CLINIC | Facility: CLINIC | Age: 70
End: 2025-02-13
Payer: MEDICARE

## 2025-02-13 ENCOUNTER — TELEPHONE (OUTPATIENT)
Dept: FAMILY MEDICINE CLINIC | Facility: CLINIC | Age: 70
End: 2025-02-13

## 2025-02-13 VITALS
HEIGHT: 68 IN | BODY MASS INDEX: 29.4 KG/M2 | WEIGHT: 194 LBS | SYSTOLIC BLOOD PRESSURE: 156 MMHG | DIASTOLIC BLOOD PRESSURE: 90 MMHG | OXYGEN SATURATION: 96 % | HEART RATE: 94 BPM

## 2025-02-13 DIAGNOSIS — E11.65 TYPE 2 DIABETES MELLITUS WITH HYPERGLYCEMIA, WITH LONG-TERM CURRENT USE OF INSULIN: Primary | ICD-10-CM

## 2025-02-13 DIAGNOSIS — I10 ESSENTIAL HYPERTENSION: ICD-10-CM

## 2025-02-13 DIAGNOSIS — Z79.4 TYPE 2 DIABETES MELLITUS WITH HYPERGLYCEMIA, WITH LONG-TERM CURRENT USE OF INSULIN: Primary | ICD-10-CM

## 2025-02-13 LAB
EXPIRATION DATE: ABNORMAL
HBA1C MFR BLD: 10.7 % (ref 4.5–5.7)
Lab: ABNORMAL

## 2025-02-13 PROCEDURE — 1160F RVW MEDS BY RX/DR IN RCRD: CPT | Performed by: INTERNAL MEDICINE

## 2025-02-13 PROCEDURE — 3077F SYST BP >= 140 MM HG: CPT | Performed by: INTERNAL MEDICINE

## 2025-02-13 PROCEDURE — 1159F MED LIST DOCD IN RCRD: CPT | Performed by: INTERNAL MEDICINE

## 2025-02-13 PROCEDURE — 3080F DIAST BP >= 90 MM HG: CPT | Performed by: INTERNAL MEDICINE

## 2025-02-13 PROCEDURE — 83036 HEMOGLOBIN GLYCOSYLATED A1C: CPT | Performed by: INTERNAL MEDICINE

## 2025-02-13 PROCEDURE — 1126F AMNT PAIN NOTED NONE PRSNT: CPT | Performed by: INTERNAL MEDICINE

## 2025-02-13 PROCEDURE — 99214 OFFICE O/P EST MOD 30 MIN: CPT | Performed by: INTERNAL MEDICINE

## 2025-02-13 PROCEDURE — 3046F HEMOGLOBIN A1C LEVEL >9.0%: CPT | Performed by: INTERNAL MEDICINE

## 2025-02-13 RX ORDER — HYDROCHLOROTHIAZIDE 12.5 MG/1
6.25 TABLET ORAL DAILY
Qty: 30 TABLET | Refills: 2 | Status: SHIPPED | OUTPATIENT
Start: 2025-02-13

## 2025-02-13 RX ORDER — INSULIN GLARGINE 300 U/ML
100 INJECTION, SOLUTION SUBCUTANEOUS DAILY
Qty: 30 ML | Refills: 3 | Status: SHIPPED | OUTPATIENT
Start: 2025-02-13

## 2025-02-13 NOTE — TELEPHONE ENCOUNTER
Pharmacy Name:  [facility]     Pharmacy representative phone number:  [phone] (Work)     What medication are you calling in regards to:  hydroCHLOROthiazide 12.5 MG tablet     What question does the pharmacy have:  PHARMACY SAID THIS MEDICATION IS LISTED ON HIS ALLERGIES     Who is the provider that prescribed the medication:  LOLA VALENCIA     Additional notes:  PLEASE CALL

## 2025-02-13 NOTE — PROGRESS NOTES
Chief Complaint   Patient presents with    Diabetes     Follow up   htn    HPI:  Gal Pierce is a 69 y.o. male who presents today for follow-up hypertension and diabetes.    ROS:  Constitutional: no fevers, night sweats or unexplained weight loss  Eyes: no vision changes  ENT: no runny nose, ear pain, sore throat  Cardio: no chest pain, palpitations  Pulm: no shortness of breath, wheezing, or cough  GI: no abdominal pain or changes in bowel movements  : no difficulty urinating  MSK: no difficulty ambulating, no joint pain  Neuro: no weakness, dizziness or headache  Psych: no trouble sleeping  Endo: no change in appetite      Past Medical History:   Diagnosis Date    CAD (coronary artery disease)     Diabetes mellitus     Elevated red blood cell count     Heart attack     Hyperlipidemia     Hypertension       Family History   Problem Relation Age of Onset    Diabetes Mother     Aneurysm Mother     Hypertension Father     Diabetes Father     Heart attack Father     Diabetes Sister     No Known Problems Brother     Diabetes Maternal Grandmother     Hypertension Maternal Grandmother     Diabetes Maternal Grandfather     Hypertension Maternal Grandfather     Diabetes Paternal Grandmother     Hypertension Paternal Grandmother     Diabetes Paternal Grandfather     Hypertension Paternal Grandfather     Diabetes Sister     Diabetes Sister     Cancer Sister     Hypertension Brother     Heart disease Brother     Diabetes Brother     Hypertension Brother       Social History     Socioeconomic History    Marital status:    Tobacco Use    Smoking status: Former     Current packs/day: 0.00     Average packs/day: 0.3 packs/day for 15.0 years (3.8 ttl pk-yrs)     Types: Cigarettes     Start date:      Quit date:      Years since quittin.1     Passive exposure: Past    Smokeless tobacco: Never    Tobacco comments:     40 years ago   Vaping Use    Vaping status: Never Used   Substance and Sexual Activity  "   Alcohol use: No    Drug use: No    Sexual activity: Defer      Allergies   Allergen Reactions    Hydrochlorothiazide Other (See Comments)     Pt states it makes his blood pressure \"real low\"    Crestor [Rosuvastatin Calcium] Myalgia    Lipitor [Atorvastatin Calcium] Myalgia    Penicillins Rash      Immunization History   Administered Date(s) Administered    COVID-19 (MODERNA) 1st,2nd,3rd Dose Monovalent 07/16/2021, 08/17/2021    FLUAD TRI 65YR+ 02/14/2013    Flu Vaccine Quad PF >36MO 10/04/2019    Flu Vaccine Split Quad 11/26/2014    Fluzone (or Fluarix & Flulaval for VFC) >6mos 11/26/2014, 10/04/2019, 10/23/2020, 11/30/2021    Fluzone High-Dose 65+yrs 10/05/2022    Influenza Seasonal Injectable 02/14/2013    Influenza, Unspecified 02/14/2013    Pneumococcal Conjugate 13-Valent (PCV13) 08/03/2015    Pneumococcal Polysaccharide (PPSV23) 07/14/2008    Tdap 02/13/2009        PE:  Vitals:    02/13/25 0947   BP: 156/90   Pulse: 94   SpO2: 96%      Body mass index is 29.5 kg/m².    Gen Appearance: NAD  HEENT: Normocephalic, PERRLA, no thyromegaly, trache midline  Heart: RRR, normal S1 and S2, no murmur  Lungs: CTA b/l, no wheezing, no crackles  Abdomen: Soft, non-tender, non-distended, no guarding and BSx4  MSK: Moves all extremities well, normal gait, no peripheral edema  Pulses: Palpable and equal b/l  Lymph nodes: No palpable lymphadenopathy   Neuro: No focal deficits      Current Outpatient Medications   Medication Sig Dispense Refill    amLODIPine (NORVASC) 10 MG tablet Take 1 tablet by mouth Daily. 90 tablet 3    aspirin 81 MG EC tablet Take 1 tablet by mouth Daily. 90 tablet 3    Blood Glucose Monitoring Suppl (Accu-Chek Guide Me) w/Device kit       clopidogrel (PLAVIX) 75 MG tablet Take 1 tablet by mouth Daily. 90 tablet 3    empagliflozin (Jardiance) 10 MG tablet tablet Take 1 tablet by mouth Daily. 90 tablet 3    Evolocumab (REPATHA) solution auto-injector SureClick injection Inject 1 mL under the skin into " the appropriate area as directed Every 14 (Fourteen) Days. 6 mL 3    fluticasone (FLONASE) 50 MCG/ACT nasal spray 1 spray into the nostril(s) as directed by provider Daily. 9.9 mL 11    glucose blood test strip 1 each by Other route Daily. Use as instructed 100 each 3    glucose monitor monitoring kit Use 1 each Daily. 1 each 0    hydroCHLOROthiazide 12.5 MG tablet Take 0.5 tablets by mouth Daily. 30 tablet 2    hydroxyurea (Hydrea) 500 MG capsule Take 1 capsule by mouth Daily. 30 capsule 2    Insulin Pen Needle (B-D ULTRAFINE III SHORT PEN) 31G X 8 MM misc Inject 1 each under the skin into the appropriate area as directed Daily. 100 each 3    losartan (COZAAR) 100 MG tablet Take 1 tablet by mouth Daily. 90 tablet 3    metoprolol succinate XL (TOPROL-XL) 100 MG 24 hr tablet Take 1 tablet by mouth Daily. 90 tablet 3    Multiple Vitamins-Minerals (CENTRUM SILVER PO) Take  by mouth Daily.      sodium-potassium-magnesium sulfates (Suprep Bowel Prep Kit) 17.5-3.13-1.6 GM/177ML solution oral solution Use as directed by provider for colonoscopy prep 354 mL 0     No current facility-administered medications for this visit.      No blood sugar readings.  Recommend checking fasting blood sugars daily and report back in the next 2 weeks.  Discussed with patient that it is difficult to change his insulin dose without glucose readings.  Currently taking 100 units of Lantus.  He is interested in switching insulins.  He reports foot swelling since switching to Lantus a few years ago.    Starting on HCTZ half tab.  Pressure uncontrolled today.  Currently he is only taking amlodipine and losartan.    Diagnoses and all orders for this visit:    1. Type 2 diabetes mellitus with hyperglycemia, with long-term current use of insulin (Primary)  -     POC Glycosylated Hemoglobin (Hb A1C)    2. Essential hypertension  -     hydroCHLOROthiazide 12.5 MG tablet; Take 0.5 tablets by mouth Daily.  Dispense: 30 tablet; Refill: 2         Return in  about 2 weeks (around 2/27/2025).     Dictated Utilizing Dragon Dictation    Please note that portions of this note were completed with a voice recognition program.    Part of this note may be an electronic transcription/translation of spoken language to printed text using the Dragon Dictation System.

## 2025-02-14 NOTE — TELEPHONE ENCOUNTER
Umer Tovar, DO  [facility]  Clinical Pool41 minutes ago (8:35 AM)       Not an allergy, just dropped his BP too low last time. Okay to dispense it. We can remove that allergy from his chart.   I've tried to contact the pharmacy. Phone kept ringing. Will try again later.

## 2025-02-27 ENCOUNTER — OFFICE VISIT (OUTPATIENT)
Dept: FAMILY MEDICINE CLINIC | Facility: CLINIC | Age: 70
End: 2025-02-27
Payer: MEDICARE

## 2025-02-27 VITALS
WEIGHT: 194 LBS | BODY MASS INDEX: 29.4 KG/M2 | HEART RATE: 87 BPM | HEIGHT: 68 IN | DIASTOLIC BLOOD PRESSURE: 98 MMHG | SYSTOLIC BLOOD PRESSURE: 176 MMHG | OXYGEN SATURATION: 97 %

## 2025-02-27 DIAGNOSIS — E11.65 TYPE 2 DIABETES MELLITUS WITH HYPERGLYCEMIA, WITH LONG-TERM CURRENT USE OF INSULIN: Primary | ICD-10-CM

## 2025-02-27 DIAGNOSIS — I10 PRIMARY HYPERTENSION: ICD-10-CM

## 2025-02-27 DIAGNOSIS — L98.9 SKIN LESION: ICD-10-CM

## 2025-02-27 DIAGNOSIS — Z79.4 TYPE 2 DIABETES MELLITUS WITH HYPERGLYCEMIA, WITH LONG-TERM CURRENT USE OF INSULIN: Primary | ICD-10-CM

## 2025-02-27 RX ORDER — SPIRONOLACTONE 25 MG/1
25 TABLET ORAL DAILY
Qty: 90 TABLET | Refills: 3 | Status: SHIPPED | OUTPATIENT
Start: 2025-02-27

## 2025-02-28 NOTE — PROGRESS NOTES
Chief Complaint   Patient presents with    Diabetes     Follow up        HPI:  Gal Pierce is a 69 y.o. male who presents today for follow-up diabetes and hypertension.  Patient states he was unable to cut HCTZ in half so he is not taking it.  Currently taking amlodipine 10, losartan 100 and metoprolol at 100.  He did miss all of his blood pressure medication for several days last week.    Fasting sugars 1 60-2 20 on average.  Currently taking 100 units of Lantus and just started on Jardiance 5 to 6 days ago.    ROS:  Constitutional: no fevers, night sweats or unexplained weight loss  Eyes: no vision changes  ENT: no runny nose, ear pain, sore throat  Cardio: no chest pain, palpitations  Pulm: no shortness of breath, wheezing, or cough  GI: no abdominal pain or changes in bowel movements  : no difficulty urinating  MSK: no difficulty ambulating, no joint pain  Neuro: no weakness, dizziness or headache  Psych: no trouble sleeping  Endo: no change in appetite      Past Medical History:   Diagnosis Date    CAD (coronary artery disease)     Diabetes mellitus     Elevated red blood cell count     Heart attack     Hyperlipidemia     Hypertension       Family History   Problem Relation Age of Onset    Diabetes Mother     Aneurysm Mother     Hypertension Father     Diabetes Father     Heart attack Father     Diabetes Sister     No Known Problems Brother     Diabetes Maternal Grandmother     Hypertension Maternal Grandmother     Diabetes Maternal Grandfather     Hypertension Maternal Grandfather     Diabetes Paternal Grandmother     Hypertension Paternal Grandmother     Diabetes Paternal Grandfather     Hypertension Paternal Grandfather     Diabetes Sister     Diabetes Sister     Cancer Sister     Hypertension Brother     Heart disease Brother     Diabetes Brother     Hypertension Brother       Social History     Socioeconomic History    Marital status:    Tobacco Use    Smoking status: Former     Current  "packs/day: 0.00     Average packs/day: 0.3 packs/day for 15.0 years (3.8 ttl pk-yrs)     Types: Cigarettes     Start date:      Quit date:      Years since quittin.1     Passive exposure: Past    Smokeless tobacco: Never    Tobacco comments:     40 years ago   Vaping Use    Vaping status: Never Used   Substance and Sexual Activity    Alcohol use: No    Drug use: No    Sexual activity: Defer      Allergies   Allergen Reactions    Hydrochlorothiazide Other (See Comments)     Pt states it makes his blood pressure \"real low\"    Crestor [Rosuvastatin Calcium] Myalgia    Lipitor [Atorvastatin Calcium] Myalgia    Penicillins Rash      Immunization History   Administered Date(s) Administered    COVID-19 (MODERNA) 1st,2nd,3rd Dose Monovalent 2021, 2021    FLUAD TRI 65YR+ 2013    Flu Vaccine Quad PF >36MO 10/04/2019    Flu Vaccine Split Quad 2014    Fluzone (or Fluarix & Flulaval for VFC) >6mos 2014, 10/04/2019, 10/23/2020, 2021    Fluzone High-Dose 65+yrs 10/05/2022    Influenza Seasonal Injectable 2013    Influenza, Unspecified 2013    Pneumococcal Conjugate 13-Valent (PCV13) 2015    Pneumococcal Polysaccharide (PPSV23) 2008    Tdap 2009        PE:  Vitals:    25 0944   BP: 176/98   Pulse: 87   SpO2: 97%      Body mass index is 29.5 kg/m².    Gen Appearance: NAD  HEENT: Normocephalic, PERRLA, no thyromegaly, trache midline  Heart: RRR, normal S1 and S2, no murmur  Lungs: CTA b/l, no wheezing, no crackles  Abdomen: Soft, non-tender, non-distended, no guarding and BSx4  MSK: Moves all extremities well, normal gait, no peripheral edema  Pulses: Palpable and equal b/l  Lymph nodes: No palpable lymphadenopathy   Neuro: No focal deficits      Current Outpatient Medications   Medication Sig Dispense Refill    amLODIPine (NORVASC) 10 MG tablet Take 1 tablet by mouth Daily. 90 tablet 3    aspirin 81 MG EC tablet Take 1 tablet by mouth Daily. 90 " tablet 3    Blood Glucose Monitoring Suppl (Accu-Chek Guide Me) w/Device kit       clopidogrel (PLAVIX) 75 MG tablet Take 1 tablet by mouth Daily. 90 tablet 3    empagliflozin (Jardiance) 10 MG tablet tablet Take 1 tablet by mouth Daily. 90 tablet 3    Evolocumab (REPATHA) solution auto-injector SureClick injection Inject 1 mL under the skin into the appropriate area as directed Every 14 (Fourteen) Days. 6 mL 3    fluticasone (FLONASE) 50 MCG/ACT nasal spray 1 spray into the nostril(s) as directed by provider Daily. 9.9 mL 11    glucose blood test strip 1 each by Other route Daily. Use as instructed 100 each 3    glucose monitor monitoring kit Use 1 each Daily. 1 each 0    hydroxyurea (Hydrea) 500 MG capsule Take 1 capsule by mouth Daily. 30 capsule 2    Insulin Glargine, 1 Unit Dial, (Toujeo SoloStar) 300 UNIT/ML solution pen-injector injection Inject 100 Units under the skin into the appropriate area as directed Daily. 30 mL 3    Insulin Pen Needle (B-D ULTRAFINE III SHORT PEN) 31G X 8 MM misc Inject 1 each under the skin into the appropriate area as directed Daily. 100 each 3    losartan (COZAAR) 100 MG tablet Take 1 tablet by mouth Daily. 90 tablet 3    metoprolol succinate XL (TOPROL-XL) 100 MG 24 hr tablet Take 1 tablet by mouth Daily. 90 tablet 3    Multiple Vitamins-Minerals (CENTRUM SILVER PO) Take  by mouth Daily.      sodium-potassium-magnesium sulfates (Suprep Bowel Prep Kit) 17.5-3.13-1.6 GM/177ML solution oral solution Use as directed by provider for colonoscopy prep 354 mL 0    spironolactone (Aldactone) 25 MG tablet Take 1 tablet by mouth Daily. 90 tablet 3     No current facility-administered medications for this visit.      He is on a very high dose of long-acting insulin and just started Jardiance last week.  Counseled on dietary changes, weight loss.  Goal fasting blood sugar should be around 130 or less.  Increase insulin to 104 units.  Recommend checking fasting sugar daily, call clinic with  results in the next 2 weeks.  He is likely having mealtime spikes as his A1c is in double digits.  To started on Jardiance we will see how he responds to this.  Consider additional medication such as GLP-1 if no improvement.    Blood pressure controlled, has not been taking HCTZ.  He is a bit fearful of this medication due to side effects in the past.  Will try spironolactone instead.  Recommend continuing to check blood pressures at home.    Counseling was given to patient for the following topics: diagnostic results, impressions, and risks and benefits of treatment options . Total time of the encounter was 40 minutes and 21 minutes was spent face to face counseling.    Diagnoses and all orders for this visit:    1. Type 2 diabetes mellitus with hyperglycemia, with long-term current use of insulin (Primary)    2. Primary hypertension  -     spironolactone (Aldactone) 25 MG tablet; Take 1 tablet by mouth Daily.  Dispense: 90 tablet; Refill: 3    3. Skin lesion         Return in about 4 weeks (around 3/27/2025) for a1c, blood pressure.     Dictated Utilizing Dragon Dictation    Please note that portions of this note were completed with a voice recognition program.    Part of this note may be an electronic transcription/translation of spoken language to printed text using the Dragon Dictation System.

## 2025-03-07 ENCOUNTER — OFFICE VISIT (OUTPATIENT)
Dept: UROLOGY | Facility: CLINIC | Age: 70
End: 2025-03-07
Payer: MEDICARE

## 2025-03-07 VITALS — BODY MASS INDEX: 29.55 KG/M2 | WEIGHT: 195 LBS | HEIGHT: 68 IN

## 2025-03-07 DIAGNOSIS — R97.20 ELEVATED PROSTATE SPECIFIC ANTIGEN (PSA): Primary | ICD-10-CM

## 2025-03-07 NOTE — PROGRESS NOTES
Elevated PSA Office Visit      Patient Name: Gal Pierce  : 1955   MRN: [id number]     Chief Complaint:  Elevated PSA.    Chief Complaint   Patient presents with    Elevated PSA         History of Present Illness: Gal Pierce is a 69 y.o. male who presents today with history of elevated PSA.  Recent PSA 7.8.  Increasing PSA trend and therefore obtain multiparametric MRI.  He presents today after completing multiparametric MRI.  MRI has revealed 2 lesions within the prostate.  #1 PI-RADS 4 lesion located in the peripheral mid gland apex.  #2 PI-RADS 5 lesion located in the right peripheral mid gland to base.  Evidence of EPE, no pelvic lymphadenopathy    Subjective      Review of System: Review of Systems   Genitourinary:  Negative for decreased urine volume, difficulty urinating, dysuria, enuresis, flank pain, frequency, hematuria and urgency.      I have reviewed the ROS documented by my clinical staff, updated as appropriate and I agree. Yoni Raygoza MD    Past Medical History:   Past Medical History:   Diagnosis Date    CAD (coronary artery disease)     Diabetes mellitus     Elevated red blood cell count     Heart attack     Hyperlipidemia     Hypertension        Past Surgical History:   Past Surgical History:   Procedure Laterality Date    APPENDECTOMY      CORONARY ARTERY BYPASS GRAFT  10/29/2018    By Osman Slater with LIMA to LAD, SVG to RCA, sequential SVG to OM/LPL       Family History:   Family History   Problem Relation Age of Onset    Diabetes Mother     Aneurysm Mother     Hypertension Father     Diabetes Father     Heart attack Father     Diabetes Sister     No Known Problems Brother     Diabetes Maternal Grandmother     Hypertension Maternal Grandmother     Diabetes Maternal Grandfather     Hypertension Maternal Grandfather     Diabetes Paternal Grandmother     Hypertension Paternal Grandmother     Diabetes Paternal Grandfather     Hypertension Paternal Grandfather      Diabetes Sister     Diabetes Sister     Cancer Sister     Hypertension Brother     Heart disease Brother     Diabetes Brother     Hypertension Brother        Social History:   Social History     Socioeconomic History    Marital status:    Tobacco Use    Smoking status: Former     Current packs/day: 0.00     Average packs/day: 0.3 packs/day for 15.0 years (3.8 ttl pk-yrs)     Types: Cigarettes     Start date:      Quit date:      Years since quittin.2     Passive exposure: Past    Smokeless tobacco: Never    Tobacco comments:     40 years ago   Vaping Use    Vaping status: Never Used   Substance and Sexual Activity    Alcohol use: No    Drug use: No    Sexual activity: Defer       Medications:     Current Outpatient Medications:     amLODIPine (NORVASC) 10 MG tablet, Take 1 tablet by mouth Daily., Disp: 90 tablet, Rfl: 3    aspirin 81 MG EC tablet, Take 1 tablet by mouth Daily., Disp: 90 tablet, Rfl: 3    Blood Glucose Monitoring Suppl (Accu-Chek Guide Me) w/Device kit, , Disp: , Rfl:     clopidogrel (PLAVIX) 75 MG tablet, Take 1 tablet by mouth Daily., Disp: 90 tablet, Rfl: 3    empagliflozin (Jardiance) 10 MG tablet tablet, Take 1 tablet by mouth Daily., Disp: 90 tablet, Rfl: 3    Evolocumab (REPATHA) solution auto-injector SureClick injection, Inject 1 mL under the skin into the appropriate area as directed Every 14 (Fourteen) Days., Disp: 6 mL, Rfl: 3    fluticasone (FLONASE) 50 MCG/ACT nasal spray, 1 spray into the nostril(s) as directed by provider Daily., Disp: 9.9 mL, Rfl: 11    glucose blood test strip, 1 each by Other route Daily. Use as instructed, Disp: 100 each, Rfl: 3    glucose monitor monitoring kit, Use 1 each Daily., Disp: 1 each, Rfl: 0    hydroxyurea (Hydrea) 500 MG capsule, Take 1 capsule by mouth Daily., Disp: 30 capsule, Rfl: 2    Insulin Glargine, 1 Unit Dial, (Toujeo SoloStar) 300 UNIT/ML solution pen-injector injection, Inject 100 Units under the skin into the  "appropriate area as directed Daily., Disp: 30 mL, Rfl: 3    Insulin Pen Needle (B-D ULTRAFINE III SHORT PEN) 31G X 8 MM misc, Inject 1 each under the skin into the appropriate area as directed Daily., Disp: 100 each, Rfl: 3    losartan (COZAAR) 100 MG tablet, Take 1 tablet by mouth Daily., Disp: 90 tablet, Rfl: 3    metoprolol succinate XL (TOPROL-XL) 100 MG 24 hr tablet, Take 1 tablet by mouth Daily., Disp: 90 tablet, Rfl: 3    Multiple Vitamins-Minerals (CENTRUM SILVER PO), Take  by mouth Daily., Disp: , Rfl:     sodium-potassium-magnesium sulfates (Suprep Bowel Prep Kit) 17.5-3.13-1.6 GM/177ML solution oral solution, Use as directed by provider for colonoscopy prep, Disp: 354 mL, Rfl: 0    spironolactone (Aldactone) 25 MG tablet, Take 1 tablet by mouth Daily., Disp: 90 tablet, Rfl: 3    Allergies:   Allergies   Allergen Reactions    Hydrochlorothiazide Other (See Comments)     Pt states it makes his blood pressure \"real low\"    Crestor [Rosuvastatin Calcium] Myalgia    Lipitor [Atorvastatin Calcium] Myalgia    Penicillins Rash       IPSS Questionnaire (AUA-7):  Over the past month…    1)  Incomplete Emptying  How often have you had a sensation of not emptying your bladder?  0 - Not at all   2)  Frequency  How often have you had to urinate less than every two hours? 0 - Not at all   3)  Intermittency  How often have you found you stopped and started again several times when you urinated?  0 - Not at all   4) Urgency  How often have you found it difficult to postpone urination?  0 - Not at all   5) Weak Stream  How often have you had a weak urinary stream?  0 - Not at all   6) Straining  How often have you had to push or strain to begin urination?  0 - Not at all   7) Nocturia  How many times did you typically get up at night to urinate?  0 - None   Total Score:  0       Quality of life due to urinary symptoms:  If you were to spend the rest of your life with your urinary condition the way it is now, how would you " "feel about that? 0-Delighted   Urine Leakage (Incontinence) 0-No Leakage         Objective     Physical Exam:   Vital Signs:   Vitals:    03/07/25 1042   Weight: 88.5 kg (195 lb)   Height: 172.7 cm (67.99\")     Body mass index is 29.66 kg/m².     Physical Exam  Vitals and nursing note reviewed.   Constitutional:       Appearance: Normal appearance.   HENT:      Head: Normocephalic and atraumatic.   Cardiovascular:      Comments: Well perfused  Pulmonary:      Effort: Pulmonary effort is normal.   Abdominal:      General: Abdomen is flat.      Palpations: Abdomen is soft.   Musculoskeletal:         General: Normal range of motion.   Skin:     General: Skin is warm and dry.   Neurological:      General: No focal deficit present.      Mental Status: He is alert and oriented to person, place, and time. Mental status is at baseline.   Psychiatric:         Mood and Affect: Mood normal.         Behavior: Behavior normal.         Thought Content: Thought content normal.         Judgment: Judgment normal.             Labs:   Hematocrit (%)   Date Value   01/14/2025 49.9   07/23/2024 51.3 (H)   10/17/2022 53.2 (H)   10/17/2022 51.0   08/02/2022 52.8 (H)   06/27/2022 55.9 (H)   03/22/2022 45.2       Lab Results   Component Value Date    PSA 7.030 (H) 01/14/2025    PSA 7.8 (H) 01/14/2025    PSA 6.170 (H) 07/23/2024       Images:   MRI Prostate With & Without Contrast    Result Date: 2/11/2025  Impression: 1. PI-RADS 5 lesion measuring 15 mm at right peripheral zone mid gland to base with bulging of the capsule concerning for early extraprostatic extension. 2. PI-RADS 4 lesion measuring 13 mm in the right peripheral zone mid gland to apex which closely abuts other lesion and may relate to same malignant process. 3. No pelvic lymphadenopathy or suspicious osseous lesion. 4. Lesions marked with images sent to [employer]. Overall PI-RADS 5 exam. Electronically Signed: Seth Robertson MD  2/11/2025 10:04 AM EST  Workstation ID: " [id number]      Measures:   Tobacco:   Gal Pierce  reports that he quit smoking about 35 years ago. His smoking use included cigarettes. He started smoking about 40 years ago. He has a 3.8 pack-year smoking history. He has been exposed to tobacco smoke. He has never used smokeless tobacco. I have educated him on the risk of diseases from using tobacco product.     Assessment / Plan      Assessment:     Mr. Pierce is a 69 y.o. male with elevated PSA.  Patient returns today after completing multiparametric MRI for workup of elevated PSA of 7.8.  MRI has revealed 2 lesions within the prostate and a PI-RADS 4 and a PI-RADS 5 lesion in the right mid gland.  We have discussed in depth the results today of MRI.  We have discussed high risk PI-RADS 4 and 5 lesions and therefore we would recommend performance of fusion guided prostate biopsy.  We have discussed the specific indication for biopsy, risk benefits and alternatives to biopsy.  After our discussion today the patient reports that he does not desire to undergo biopsy at this time.  He would like to wait, recheck a PSA and if there is increase he may further consider biopsy.  We have discussed the risk associated with not proceeding with biopsy.  Discussed potential progression of lesions, he reports understanding acknowledgment and declines biopsy at this time.  He would like to monitor.  Therefore we will have him follow-up in 3 months with a repeat PSA.  We will again discuss biopsy at that time and if PSA is increasing we will further discuss need for biopsy.  Reports that he desires to schedule biopsy prior to next scheduled visit he will contact    Diagnoses and all orders for this visit:    1. Elevated prostate specific antigen (PSA) (Primary)  -     PSA Total+% Free (Serial); Future             Follow Up:   Return in about 3 months (around 6/7/2025) for Recheck.    I spent approximately 40 minutes providing clinical care for this patient; including  review of patient's chart and provider documentation, face to face time spent with patient in examination room (obtaining history, performing physical exam, discussing diagnosis and management options), placing orders, and completing patient documentation.     Yoni Raygoza MD  [facility]

## 2025-03-11 ENCOUNTER — SPECIALTY PHARMACY (OUTPATIENT)
Dept: CARDIOLOGY | Facility: CLINIC | Age: 70
End: 2025-03-11
Payer: MEDICARE

## 2025-03-11 NOTE — PROGRESS NOTES
Specialty Pharmacy Patient Management Program  One-Time Clinical Outreach     Major Charles Pierce is a 69 y.o. male seen by a Cardiology provider for Hyperlipidemia and enrolled in the Cardiology Patient Management program offered by [facility].      Open Medication Therapy Problems  Hyperlipidemia LDL goal <70   1 Current Medication: Evolocumab (REPATHA) solution auto-injector SureClick injection   Current Medication Sig: Inject 1 mL under the skin into the appropriate area as directed Every 14 (Fourteen) Days.   Rationale: Patient forgets to take - Adherence - Adherence   Recommendation: Discontinue Medication   Identified Date: 1/27/2025   Note: 03/11/25  Patient was contacted to follow-up regarding Repatha that is $0 using [employer]. He said he has never started the medication and is not going to. I have removed his specialty enrollment and his provider has been notified.   01/30/25  Problem: Patient forgets to take medications  Communication: Patient reports that he has missed anywhere from 4-6 doses   Recommendation: Provided education to improve compliance including using a calendar to help him remember dates  Follow-up: 1 month            Yesy Huertas, PharmD, [facility]  Clinical Specialty Pharmacist, Cardiology  3/11/2025  15:03 EDT

## 2025-03-18 ENCOUNTER — TELEPHONE (OUTPATIENT)
Dept: CARDIOLOGY | Facility: CLINIC | Age: 70
End: 2025-03-18

## 2025-03-18 NOTE — TELEPHONE ENCOUNTER
"----- Message from Paolo Faust sent at 3/12/2025  5:26 PM EDT -----  Regarding: FW: Repatha d/c  Please contact Mr. Pierce and tell him he is making a mistake not taking Repatha for his cholesterol.  He has had heart attack and previous bypass surgery and is at high risk for cardiovascular event.  Repatha helps reduce that risk.KIKA Faust MD [facility]  ----- Message -----  From: Grace Will, Pharmacy Technician  Sent: 3/11/2025   2:12 PM EDT  To: Paolo Faust IV, MD; Yesy DAVILA Va#  Subject: Repatha d/c                                      Dr Faust,We were able to get Mr Pierce his Repatha for free with a ermelinda several months ago after he told us the cost was too much. I shipped him 3 months for $0 and called him today to follow up on his progress.He states he never started it and does not plan to start. He looked it up and it has \"too many side effects for him\".At this time, we will close his specialty pharmacy episode. If he requires re-enrollment in the future, please let us know.Thank you for the referral.Grace Will, CPhTPharmacy Care CoordinatorNoland Hospital [facility] PharmacyM[facility] [phone]/11/202514:11 EDT  "

## 2025-03-18 NOTE — TELEPHONE ENCOUNTER
"I spoke with the patient to discuss indication for Repatha.  He reports that he understood what it was for and thinks the side effects are worse than, \"anything he has going on now.\" I explained to him that he wouldn't feel bad necessarily if his cholesterol was uncontrolled and he is at risk for a repeat cardiovascular event. He reports that, \"it is fine and he does not want to proceed\". He can not tolerate statins. He is currently not on anything for cholesterol.     Lab Results   Component Value Date    CHOL 219 (H) 01/14/2025    CHLPL 175 10/17/2022    TRIG 106 01/14/2025    HDL 38 (L) 01/14/2025     (H) 01/14/2025      "

## 2025-03-19 NOTE — TELEPHONE ENCOUNTER
Unfortunate decision for him.  Should follow-up with his primary care provider.  His unwillingness to comply with medical treatment makes it hard for me to help.    KIKA Faust MD [facility], [facility]  Interventional and General Cardiology

## 2025-03-27 ENCOUNTER — OFFICE VISIT (OUTPATIENT)
Dept: FAMILY MEDICINE CLINIC | Facility: CLINIC | Age: 70
End: 2025-03-27
Payer: MEDICARE

## 2025-03-27 VITALS
BODY MASS INDEX: 29.8 KG/M2 | OXYGEN SATURATION: 96 % | HEART RATE: 84 BPM | DIASTOLIC BLOOD PRESSURE: 100 MMHG | WEIGHT: 196.6 LBS | SYSTOLIC BLOOD PRESSURE: 164 MMHG | HEIGHT: 68 IN

## 2025-03-27 DIAGNOSIS — I10 ESSENTIAL HYPERTENSION: ICD-10-CM

## 2025-03-27 DIAGNOSIS — E11.65 TYPE 2 DIABETES MELLITUS WITH HYPERGLYCEMIA, WITH LONG-TERM CURRENT USE OF INSULIN: Primary | ICD-10-CM

## 2025-03-27 DIAGNOSIS — E78.5 HYPERLIPIDEMIA, UNSPECIFIED HYPERLIPIDEMIA TYPE: ICD-10-CM

## 2025-03-27 DIAGNOSIS — Z79.4 TYPE 2 DIABETES MELLITUS WITH HYPERGLYCEMIA, WITH LONG-TERM CURRENT USE OF INSULIN: Primary | ICD-10-CM

## 2025-03-27 LAB
EXPIRATION DATE: ABNORMAL
HBA1C MFR BLD: 10.7 % (ref 4.5–5.7)
Lab: ABNORMAL

## 2025-03-27 NOTE — PROGRESS NOTES
Chief Complaint   Patient presents with    Hypertension     Follow up     Diabetes     A1c check        HPI:  Gal Pierce is a 69 y.o. male who presents today for follow-up diabetes and hypertension.  Unfortunately, patient made no medication changes after last visit.    ROS:  Constitutional: no fevers, night sweats or unexplained weight loss  Eyes: no vision changes  ENT: no runny nose, ear pain, sore throat  Cardio: no chest pain, palpitations  Pulm: no shortness of breath, wheezing, or cough  GI: no abdominal pain or changes in bowel movements  : no difficulty urinating  MSK: no difficulty ambulating, no joint pain  Neuro: no weakness, dizziness or headache  Psych: no trouble sleeping  Endo: no change in appetite      Past Medical History:   Diagnosis Date    CAD (coronary artery disease)     Diabetes mellitus     Elevated red blood cell count     Heart attack     Hyperlipidemia     Hypertension       Family History   Problem Relation Age of Onset    Diabetes Mother     Aneurysm Mother     Hypertension Father     Diabetes Father     Heart attack Father     Diabetes Sister     No Known Problems Brother     Diabetes Maternal Grandmother     Hypertension Maternal Grandmother     Diabetes Maternal Grandfather     Hypertension Maternal Grandfather     Diabetes Paternal Grandmother     Hypertension Paternal Grandmother     Diabetes Paternal Grandfather     Hypertension Paternal Grandfather     Diabetes Sister     Diabetes Sister     Cancer Sister     Hypertension Brother     Heart disease Brother     Diabetes Brother     Hypertension Brother       Social History     Socioeconomic History    Marital status:    Tobacco Use    Smoking status: Former     Current packs/day: 0.00     Average packs/day: 0.3 packs/day for 15.0 years (3.8 ttl pk-yrs)     Types: Cigarettes     Start date:      Quit date:      Years since quittin.2     Passive exposure: Past    Smokeless tobacco: Never    Tobacco  "comments:     40 years ago   Vaping Use    Vaping status: Never Used   Substance and Sexual Activity    Alcohol use: No    Drug use: No    Sexual activity: Defer      Allergies   Allergen Reactions    Hydrochlorothiazide Other (See Comments)     Pt states it makes his blood pressure \"real low\"    Crestor [Rosuvastatin Calcium] Myalgia    Lipitor [Atorvastatin Calcium] Myalgia    Penicillins Rash      Immunization History   Administered Date(s) Administered    COVID-19 (MODERNA) 1st,2nd,3rd Dose Monovalent 07/16/2021, 08/17/2021    FLUAD TRI 65YR+ 02/14/2013    Flu Vaccine Quad PF >36MO 10/04/2019    Flu Vaccine Split Quad 11/26/2014    Fluzone (or Fluarix & Flulaval for VFC) >6mos 11/26/2014, 10/04/2019, 10/23/2020, 11/30/2021    Fluzone High-Dose 65+yrs 10/05/2022    Influenza Seasonal Injectable 02/14/2013    Influenza, Unspecified 02/14/2013    Pneumococcal Conjugate 13-Valent (PCV13) 08/03/2015    Pneumococcal Polysaccharide (PPSV23) 07/14/2008    Tdap 02/13/2009        PE:  Vitals:    03/27/25 1048   BP: 164/100   Pulse: 84   SpO2: 96%      Body mass index is 29.9 kg/m².    Gen Appearance: NAD  HEENT: Normocephalic, PERRLA, no thyromegaly, trache midline  Heart: RRR, normal S1 and S2, no murmur  Lungs: CTA b/l, no wheezing, no crackles  Abdomen: Soft, non-tender, non-distended, no guarding and BSx4  MSK: Moves all extremities well, normal gait, no peripheral edema  Pulses: Palpable and equal b/l  Lymph nodes: No palpable lymphadenopathy   Neuro: No focal deficits      Current Outpatient Medications   Medication Sig Dispense Refill    amLODIPine (NORVASC) 10 MG tablet Take 1 tablet by mouth Daily. 90 tablet 3    aspirin 81 MG EC tablet Take 1 tablet by mouth Daily. 90 tablet 3    Blood Glucose Monitoring Suppl (Accu-Chek Guide Me) w/Device kit       clopidogrel (PLAVIX) 75 MG tablet Take 1 tablet by mouth Daily. 90 tablet 3    empagliflozin (Jardiance) 10 MG tablet tablet Take 1 tablet by mouth Daily. 90 tablet " 3    Evolocumab (REPATHA) solution auto-injector SureClick injection Inject 1 mL under the skin into the appropriate area as directed Every 14 (Fourteen) Days. 6 mL 3    fluticasone (FLONASE) 50 MCG/ACT nasal spray 1 spray into the nostril(s) as directed by provider Daily. 9.9 mL 11    glucose blood test strip 1 each by Other route Daily. Use as instructed 100 each 3    glucose monitor monitoring kit Use 1 each Daily. 1 each 0    hydroxyurea (Hydrea) 500 MG capsule Take 1 capsule by mouth Daily. 30 capsule 2    Insulin Glargine, 1 Unit Dial, (Toujeo SoloStar) 300 UNIT/ML solution pen-injector injection Inject 100 Units under the skin into the appropriate area as directed Daily. 30 mL 3    Insulin Pen Needle (B-D ULTRAFINE III SHORT PEN) 31G X 8 MM misc Inject 1 each under the skin into the appropriate area as directed Daily. 100 each 3    losartan (COZAAR) 100 MG tablet Take 1 tablet by mouth Daily. 90 tablet 3    metoprolol succinate XL (TOPROL-XL) 100 MG 24 hr tablet Take 1 tablet by mouth Daily. 90 tablet 3    Multiple Vitamins-Minerals (CENTRUM SILVER PO) Take  by mouth Daily.      sodium-potassium-magnesium sulfates (Suprep Bowel Prep Kit) 17.5-3.13-1.6 GM/177ML solution oral solution Use as directed by provider for colonoscopy prep 354 mL 0    spironolactone (Aldactone) 25 MG tablet Take 1 tablet by mouth Daily. 90 tablet 3    Tirzepatide 2.5 MG/0.5ML solution auto-injector Inject 2.5 mg under the skin into the appropriate area as directed 1 (One) Time Per Week. 2 mL 2     No current facility-administered medications for this visit.      Unfortunately no medication changes were made after last visit.    A1c remains uncontrolled, starting on Mounjaro weekly.  Increase long-acting insulin to 104 units.  Continue Jardiance 10 mg.  Recheck A1c at follow-up visit.    Started on spironolactone 25 mg, continue amlodipine, losartan and metoprolol.  Medication list reviewed and sent home with patient.    Discussed  cholesterol-lowering medication and potential trial of Repatha with patient. Discussed importance of lowering cholesterol to reduce cardiovascular risk.  Uncontrolled diabetes and htn significantly increases risk as well.    Counseling was given to patient for the following topics: diagnostic results, impressions, risks and benefits of treatment options, and importance of treatment compliance . Total time of the encounter was 40 minutes and 20 minutes was spent face to face counseling.      Diagnoses and all orders for this visit:    1. Type 2 diabetes mellitus with hyperglycemia, with long-term current use of insulin (Primary)  -     Ambulatory Referral to Endocrinology  -     Tirzepatide 2.5 MG/0.5ML solution auto-injector; Inject 2.5 mg under the skin into the appropriate area as directed 1 (One) Time Per Week.  Dispense: 2 mL; Refill: 2  -     POC Glycosylated Hemoglobin (Hb A1C)    2. Essential hypertension    3. Hyperlipidemia, unspecified hyperlipidemia type         Return in about 4 weeks (around 4/24/2025) for htn.     Dictated Utilizing Dragon Dictation    Please note that portions of this note were completed with a voice recognition program.    Part of this note may be an electronic transcription/translation of spoken language to printed text using the Dragon Dictation System.

## 2025-04-11 DIAGNOSIS — I10 ESSENTIAL HYPERTENSION: Chronic | ICD-10-CM

## 2025-04-11 RX ORDER — LOSARTAN POTASSIUM 100 MG/1
100 TABLET ORAL DAILY
Qty: 90 TABLET | Refills: 3 | Status: SHIPPED | OUTPATIENT
Start: 2025-04-11

## 2025-04-14 DIAGNOSIS — Z79.4 TYPE 2 DIABETES MELLITUS WITH HYPERGLYCEMIA, WITH LONG-TERM CURRENT USE OF INSULIN: ICD-10-CM

## 2025-04-14 DIAGNOSIS — E11.65 TYPE 2 DIABETES MELLITUS WITH HYPERGLYCEMIA, WITH LONG-TERM CURRENT USE OF INSULIN: ICD-10-CM

## 2025-04-14 RX ORDER — INSULIN GLARGINE 100 [IU]/ML
INJECTION, SOLUTION SUBCUTANEOUS
Qty: 60 ML | Refills: 5 | OUTPATIENT
Start: 2025-04-14

## 2025-04-17 DIAGNOSIS — E11.65 TYPE 2 DIABETES MELLITUS WITH HYPERGLYCEMIA, WITH LONG-TERM CURRENT USE OF INSULIN: Primary | ICD-10-CM

## 2025-04-17 DIAGNOSIS — Z79.4 TYPE 2 DIABETES MELLITUS WITH HYPERGLYCEMIA, WITH LONG-TERM CURRENT USE OF INSULIN: Primary | ICD-10-CM

## 2025-04-24 ENCOUNTER — TELEPHONE (OUTPATIENT)
Age: 70
End: 2025-04-24

## 2025-04-24 ENCOUNTER — OFFICE VISIT (OUTPATIENT)
Age: 70
End: 2025-04-24
Payer: MEDICARE

## 2025-04-24 VITALS
OXYGEN SATURATION: 97 % | WEIGHT: 189 LBS | HEART RATE: 80 BPM | DIASTOLIC BLOOD PRESSURE: 84 MMHG | SYSTOLIC BLOOD PRESSURE: 156 MMHG | HEIGHT: 68 IN | BODY MASS INDEX: 28.64 KG/M2

## 2025-04-24 DIAGNOSIS — E11.65 TYPE 2 DIABETES MELLITUS WITH HYPERGLYCEMIA, WITH LONG-TERM CURRENT USE OF INSULIN: Primary | ICD-10-CM

## 2025-04-24 DIAGNOSIS — I25.119 CORONARY ARTERY DISEASE INVOLVING NATIVE CORONARY ARTERY OF NATIVE HEART WITH ANGINA PECTORIS: Chronic | ICD-10-CM

## 2025-04-24 DIAGNOSIS — E78.5 HYPERLIPIDEMIA LDL GOAL <70: Chronic | ICD-10-CM

## 2025-04-24 DIAGNOSIS — I10 ESSENTIAL HYPERTENSION: Chronic | ICD-10-CM

## 2025-04-24 DIAGNOSIS — Z79.4 TYPE 2 DIABETES MELLITUS WITH HYPERGLYCEMIA, WITH LONG-TERM CURRENT USE OF INSULIN: Primary | ICD-10-CM

## 2025-04-24 LAB
EXPIRATION DATE: ABNORMAL
GLUCOSE BLDC GLUCOMTR-MCNC: 337 MG/DL (ref 70–130)
Lab: ABNORMAL

## 2025-04-24 PROCEDURE — 82043 UR ALBUMIN QUANTITATIVE: CPT | Performed by: PHYSICIAN ASSISTANT

## 2025-04-24 PROCEDURE — 82570 ASSAY OF URINE CREATININE: CPT | Performed by: PHYSICIAN ASSISTANT

## 2025-04-24 RX ORDER — INSULIN DEGLUDEC 200 U/ML
INJECTION, SOLUTION SUBCUTANEOUS
Qty: 45 ML | Refills: 0 | Status: SHIPPED | OUTPATIENT
Start: 2025-04-24

## 2025-04-24 NOTE — PROGRESS NOTES
"    Cardiology Outpatient Visit      Identification: Gal Pierce is a 69 y.o. male who resides in [place], [place]    Reason for visit:  Coronary artery disease involving native coronary artery of      Subjective      Patient is a 69-year-old gentleman who returns today for follow-up of his coronary artery disease cardiac risk factors.  His lipids have been uncontrolled and he is intolerant to statins.  Repatha was recommended but he never started it.  It was also recommended he start Mounjaro but he never started it as well.  He has read about the side effects and did not like what he read.  He denies any chest pain or shortness of breath.  His blood pressures are controlled.  His last LDL was 167.  He has type 2 diabetes mellitus and his hemoglobin A1c is over 10.  He is on Jardiance and uses insulin.  He is trying to control his diabetes more with diet then with medication changes.  He admits eating a lot of chips and crackers.    Review of Systems   Constitutional: Negative for malaise/fatigue.   Eyes:  Negative for vision loss in left eye and vision loss in right eye.   Cardiovascular:  Negative for chest pain, dyspnea on exertion, near-syncope, orthopnea, palpitations, paroxysmal nocturnal dyspnea and syncope.   Musculoskeletal:  Negative for myalgias.   Neurological:  Negative for brief paralysis, excessive daytime sleepiness, focal weakness, numbness, paresthesias and weakness.   All other systems reviewed and are negative.      Allergies   Allergen Reactions    Hydrochlorothiazide Other (See Comments)     Pt states it makes his blood pressure \"real low\"    Crestor [Rosuvastatin Calcium] Myalgia    Lipitor [Atorvastatin Calcium] Myalgia    Penicillins Rash         Current Outpatient Medications   Medication Instructions    amLODIPine (NORVASC) 10 mg, Oral, Daily    aspirin 81 mg, Oral, Daily    Blood Glucose Monitoring Suppl (Accu-Chek Guide Me) w/Device kit     clopidogrel (PLAVIX) 75 mg, Oral, " "Daily    empagliflozin (JARDIANCE) 10 mg, Oral, Daily    Evolocumab (REPATHA) 140 mg, Subcutaneous, Every 14 Days    fluticasone (FLONASE) 50 MCG/ACT nasal spray 1 spray, Nasal, Daily    glucose blood test strip 1 each, Other, Daily, Use as instructed    glucose monitor monitoring kit 1 each, Not Applicable, Daily    hydroxyurea (HYDREA) 500 mg, Oral, Daily    Insulin Degludec (Tresiba FlexTouch) 200 UNIT/ML solution pen-injector pen injection Inject 70 units and increase as directed. MDD 90 units    Insulin Pen Needle (B-D ULTRAFINE III SHORT PEN) 31G X 8 MM misc 1 each, Subcutaneous, Daily    losartan (COZAAR) 100 mg, Oral, Daily    metoprolol succinate XL (TOPROL-XL) 100 mg, Oral, Daily    Multiple Vitamins-Minerals (CENTRUM SILVER PO) Daily    sodium-potassium-magnesium sulfates (Suprep Bowel Prep Kit) 17.5-3.13-1.6 GM/177ML solution oral solution Use as directed by provider for colonoscopy prep    spironolactone (ALDACTONE) 25 mg, Oral, Daily         Objective     /78 (BP Location: Right arm, Patient Position: Sitting, Cuff Size: Adult)   Pulse 76   Ht 170.2 cm (67\")   Wt 88.3 kg (194 lb 9.6 oz)   SpO2 95%   BMI 30.48 kg/m²       Constitutional:       Appearance: Healthy appearance. Well-developed.   Eyes:      General: Lids are normal. No scleral icterus.     Conjunctiva/sclera: Conjunctivae normal.   HENT:      Head: Normocephalic and atraumatic.   Neck:      Thyroid: No thyromegaly.      Vascular: No carotid bruit or JVD.   Pulmonary:      Effort: Pulmonary effort is normal.      Breath sounds: Normal breath sounds. No wheezing. No rhonchi. No rales.   Cardiovascular:      Normal rate. Regular rhythm.      Murmurs: There is no murmur.      No gallop.  No rub.   Pulses:     Intact distal pulses.   Edema:     Peripheral edema absent.   Abdominal:      General: There is no distension.      Palpations: Abdomen is soft. There is no abdominal mass.   Musculoskeletal:      Cervical back: Normal range of " motion. Skin:     General: Skin is warm and dry.      Findings: No rash.   Neurological:      General: No focal deficit present.      Mental Status: Alert and oriented to person, place, and time.      Gait: Gait is intact.   Psychiatric:         Attention and Perception: Attention normal.         Mood and Affect: Mood normal.         Behavior: Behavior normal.         Result Review  (reviewed with patient):        ECG 12 Lead    Date/Time: 5/6/2025 10:14 AM  Performed by: Chiquita Keith APRN    Authorized by: Chqiuita Keith APRN  Comparison: compared with previous ECG from 10/5/2022  Similar to previous ECG  Rhythm: sinus rhythm  BPM: 76  Other findings: non-specific ST-T wave changes    Clinical impression: abnormal EKG  Comments: ET/QTc 386/434 MS           Lab Results   Component Value Date    GLUCOSE 302 (H) 01/14/2025    BUN 10 01/14/2025    CREATININE 0.84 01/14/2025     (L) 01/14/2025    K 3.9 01/14/2025     01/14/2025    CALCIUM 9.2 01/14/2025    PROTEINTOT 7.5 01/14/2025    ALBUMIN 4.2 01/14/2025    ALT 37 01/14/2025    AST 26 01/14/2025    ALKPHOS 72 01/14/2025    BILITOT 0.3 01/14/2025    GLOB 3.3 01/14/2025    AGRATIO 1.3 01/14/2025    BCR 11.9 01/14/2025    ANIONGAP 13.0 01/14/2025    EGFR 95.6 01/14/2025     Lab Results   Component Value Date    WBC 5.66 01/14/2025    HGB 16.9 01/14/2025    HCT 49.9 01/14/2025    MCV 87.5 01/14/2025     01/14/2025     Lab Results   Component Value Date    CHOL 219 (H) 01/14/2025    CHLPL 175 10/17/2022    TRIG 106 01/14/2025    HDL 38 (L) 01/14/2025     (H) 01/14/2025     Lab Results   Component Value Date    HGBA1C 10.7 (A) 03/27/2025             Assessment     Diagnoses and all orders for this visit:    1. Coronary artery disease involving native coronary artery of native heart with angina pectoris (Primary)  Overview:  NSTEMI in 2002 with drug-eluting stent to the LAD  NSTEMI 2004 with drug-eluting stent to the left  circumflex.  Echocardiogram (10/26/2018): LVEF normal. Mild LVH. Mild MR.   Cardiac catheterization for NSTEMI  (10/26/2018): severe 3-vessel CAD.  LVEF normal.  CABG (10/29/2018): LIMA to LAD, SVG to RCA, sequential SVG to OM and LPL    Assessment & Plan:  No angina  Continue aspirin 81 mg daily  Continue Plavix 75 mg daily      2. Essential hypertension  Overview:  Target blood pressure <130/80 mmHg  Tolerate HCTZ due to hypotension    Assessment & Plan:  Pretension is controlled  Continue metoprolol succinate 100 mg daily  Continue amlodipine 10 mg daily  Continue losartan 100 mg daily  Continue spironolactone 25 mg daily      3. Hyperlipidemia LDL goal <70  Overview:  High intensity statin therapy indicated due to CAD  Historically intolerant to rosuvastatin and atorvastatin    Assessment & Plan:  Patient agreeable to try Repatha 140 mg subcutaneous injection every 2 weeks      Other orders  -     Evolocumab (REPATHA) solution auto-injector SureClick injection; Inject 1 mL under the skin into the appropriate area as directed Every 14 (Fourteen) Days.  Dispense: 6 mL; Refill: 3  -     ECG 12 Lead          Plan   Long discussion with patient regarding correlation between uncontrolled hyperlipidemia and uncontrolled diabetes mellitus and progression of heart disease.  He is agreeable to trying Repatha 140 mg subcutaneous injection every 2 weeks  Patient educated on consistent low carbohydrate diet for better control of diabetes.  He refuses a GLP.  Patient should follow-up with Dr. Tovar for better control of diabetes mellitus.  Hemoglobin A1c goal is to be 7 or less      Follow-up   Return in about 8 months (around 1/6/2026), or if symptoms worsen or fail to improve, for Follow-up with Dr. Faust next visit.      CHERELLE Singleton  5/6/2025

## 2025-04-24 NOTE — ASSESSMENT & PLAN NOTE
Diabetes is worsening, A1c has not been at goal for 3 years  Prior labs reviewed from January and last A1c in March 2025  Microalbumin today  Patient asked to schedule eye exam  Patient is not following diabetic diet, counseled on importance of changing diet to prevent complications in the future  Including: MI, amputation, kidney failure, blindness, infections  Patient refuses diabetes education at this time  Patient advised he must start taking his Jardiance and Mounjaro as prescribed in order for blood glucose levels to improve  Patient to increase Tresiba to 70 units daily, then increase by 2 units until fasting blood sugar is less than 120

## 2025-04-24 NOTE — ASSESSMENT & PLAN NOTE
Patient was started on Repatha, as he was intolerant to statins in the past  LDL prior to medication was 162, will recheck in 3 months

## 2025-04-24 NOTE — PATIENT INSTRUCTIONS
Tresiba increase to 70 units once a day, then if blood sugar is more than 100, keep increasing the dose of insulin  Start mounjaro/tirzepatide, once a week  Jardiance 10 mg every day

## 2025-04-24 NOTE — PROGRESS NOTES
"     Office Note      Date: 2025  Patient Name: Gal Pierce  MRN: [id number]  : 1955    Chief Complaint   Patient presents with    Diabetes       History of Present Illness:   Gal Pierce is a 69 y.o. male who presents for Diabetes type 2. Diagnosed in: . Treated in past with oral agents, metformin abdominal pain and diarrhea, lantus-tresiba Current treatments: tresiba 60 units a day. Number of insulin shots per day: 1. Checks blood sugar 1 times a day. Has low blood sugar: no. Aspirin use: Yes. Statin use: No - on repatha, started recently . ACE-I/ARB use: Yes.   Patient has Jardiance and mounjaro at home  but hasn't started taking either. Admits to eating doughnuts, bread, orange juice. He is also not taking metoprolol or spironolactone.    Eye exam: , due now    Subjective     Review of Systems   Constitutional:  Negative for fatigue.   HENT:  Positive for postnasal drip.    Respiratory:  Positive for cough.    Endocrine: Negative for polydipsia and polyuria.   Musculoskeletal:  Positive for arthralgias and back pain.   Skin:  Positive for rash.   Hematological:  Bruises/bleeds easily.         Diabetic Complications:  Eyes: No  Kidneys: No  Feet: No  Heart: Yes - hx cabg, mi, subsequent cardiac stents    Diet and Exercise:  Meals per day: 2  Minutes of exercise per week: 360 mins.      The following portions of the patient's history were reviewed and updated as appropriate: allergies, current medications, past family history, past medical history, past social history, past surgical history, and problem list.    Objective     Visit Vitals  /84 (BP Location: Right arm, Patient Position: Sitting, Cuff Size: Adult)   Pulse 80   Ht 172.7 cm (68\")   Wt 85.7 kg (189 lb)   SpO2 97%   BMI 28.74 kg/m²       Physical Exam:  Physical Exam  Constitutional:       Appearance: Normal appearance. He is obese.   HENT:      Head: Normocephalic and atraumatic.      Nose: Nose normal. "   Eyes:      Conjunctiva/sclera: Conjunctivae normal.   Cardiovascular:      Rate and Rhythm: Normal rate.      Pulses:           Dorsalis pedis pulses are 2+ on the right side and 2+ on the left side.        Posterior tibial pulses are 2+ on the right side and 2+ on the left side.   Pulmonary:      Effort: Pulmonary effort is normal.   Feet:      Right foot:      Protective Sensation: 8 sites tested.  8 sites sensed.      Skin integrity: Skin integrity normal.      Toenail Condition: Right toenails are normal.      Left foot:      Protective Sensation: 8 sites tested.  8 sites sensed.      Skin integrity: Skin integrity normal.      Toenail Condition: Left toenails are normal.   Skin:     General: Skin is warm and dry.   Neurological:      General: No focal deficit present.      Mental Status: He is alert and oriented to person, place, and time. Mental status is at baseline.   Psychiatric:         Mood and Affect: Mood normal.         Behavior: Behavior normal.         Thought Content: Thought content normal.         Judgment: Judgment normal.         Labs:    HbA1c  Hemoglobin A1C   Date Value Ref Range Status   03/27/2025 10.7 (A) 4.5 - 5.7 % Final   01/14/2025 11.40 (H) 4.80 - 5.60 % Final     CMP  Lab Results   Component Value Date    GLUCOSE 302 (H) 01/14/2025    BUN 10 01/14/2025    CREATININE 0.84 01/14/2025     (L) 01/14/2025    K 3.9 01/14/2025     01/14/2025    CALCIUM 9.2 01/14/2025    PROTEINTOT 7.5 01/14/2025    ALBUMIN 4.2 01/14/2025    ALT 37 01/14/2025    AST 26 01/14/2025    ALKPHOS 72 01/14/2025    BILITOT 0.3 01/14/2025    GLOB 3.3 01/14/2025    AGRATIO 1.3 01/14/2025    BCR 11.9 01/14/2025    ANIONGAP 13.0 01/14/2025    EGFR 95.6 01/14/2025         Lipid Panel  Lab Results   Component Value Date    HDL Cholesterol 38 (L) 01/14/2025    LDL Cholesterol  162 (H) 01/14/2025    LDL Chol Calc (NIH) 114 (H) 10/17/2022    LDL/HDL Ratio 4.21 01/14/2025    Triglycerides 106 01/14/2025     "    TSH  Lab Results   Component Value Date    TSH 0.503 01/14/2025    FREET4 1.22 01/14/2025        Hemoglobin A1C  No components found for: \"HGBA1C\"     Microalbumin/Creatinine  No results found for: \"MALBCRERATI\"        Assessment / Plan      Assessment & Plan:  Diagnoses and all orders for this visit:    1. Type 2 diabetes mellitus with hyperglycemia, with long-term current use of insulin (Primary)  Assessment & Plan:  Diabetes is worsening, A1c has not been at goal for 3 years  Prior labs reviewed from January and last A1c in March 2025  Microalbumin today  Patient asked to schedule eye exam  Patient is not following diabetic diet, counseled on importance of changing diet to prevent complications in the future  Including: MI, amputation, kidney failure, blindness, infections  Patient refuses diabetes education at this time  Patient advised he must start taking his Jardiance and Mounjaro as prescribed in order for blood glucose levels to improve  Patient to increase Tresiba to 70 units daily, then increase by 2 units until fasting blood sugar is less than 120          Orders:  -     Microalbumin / Creatinine Urine Ratio - Urine, Clean Catch  -     POC Glucose, Blood  -     Insulin Degludec (Tresiba FlexTouch) 200 UNIT/ML solution pen-injector pen injection; Inject 70 units and increase as directed. MDD 90 units  Dispense: 45 mL; Refill: 0    2. Essential hypertension  Assessment & Plan:  Uncontrolled, most likely secondary to noncompliance  Patient reports he is only taking amlodipine and losartan, although he is prescribed Toprol and spironolactone as well  Offered to send in new prescriptions for patient, but he refused      3. Hyperlipidemia LDL goal <70  Assessment & Plan:   Patient was started on Repatha, as he was intolerant to statins in the past  LDL prior to medication was 162, will recheck in 3 months      4. Coronary artery disease involving native coronary artery of native heart with angina " pectoris       Current Outpatient Medications   Medication Instructions    amLODIPine (NORVASC) 10 mg, Oral, Daily    aspirin 81 mg, Oral, Daily    Blood Glucose Monitoring Suppl (Accu-Chek Guide Me) w/Device kit     clopidogrel (PLAVIX) 75 mg, Oral, Daily    empagliflozin (JARDIANCE) 10 mg, Oral, Daily    fluticasone (FLONASE) 50 MCG/ACT nasal spray 1 spray, Nasal, Daily    glucose blood test strip 1 each, Other, Daily, Use as instructed    glucose monitor monitoring kit 1 each, Not Applicable, Daily    hydroxyurea (HYDREA) 500 mg, Oral, Daily    Insulin Degludec (Tresiba FlexTouch) 200 UNIT/ML solution pen-injector pen injection Inject 70 units and increase as directed. MDD 90 units    Insulin Pen Needle (B-D ULTRAFINE III SHORT PEN) 31G X 8 MM misc 1 each, Subcutaneous, Daily    losartan (COZAAR) 100 mg, Oral, Daily    metoprolol succinate XL (TOPROL-XL) 100 mg, Oral, Daily    Multiple Vitamins-Minerals (CENTRUM SILVER PO) Daily    Repatha SureClick 140 mg, Subcutaneous, Every 14 Days    sodium-potassium-magnesium sulfates (Suprep Bowel Prep Kit) 17.5-3.13-1.6 GM/177ML solution oral solution Use as directed by provider for colonoscopy prep    spironolactone (ALDACTONE) 25 mg, Oral, Daily    Tirzepatide 2.5 mg, Subcutaneous, Weekly      No follow-ups on file.    Electronically signed by: Donavon Sanford PA-C  04/24/2025

## 2025-04-24 NOTE — ASSESSMENT & PLAN NOTE
Uncontrolled, most likely secondary to noncompliance  Patient reports he is only taking amlodipine and losartan, although he is prescribed Toprol and spironolactone as well  Offered to send in new prescriptions for patient, but he refused

## 2025-04-26 LAB
ALBUMIN UR-MCNC: 47.8 MG/DL
CREAT UR-MCNC: 125.5 MG/DL
MICROALBUMIN/CREAT UR: 380.9 MG/G (ref 0–29)

## 2025-04-28 ENCOUNTER — RESULTS FOLLOW-UP (OUTPATIENT)
Age: 70
End: 2025-04-28
Payer: MEDICARE

## 2025-04-28 NOTE — LETTER
Gal Charles Silvabitt  [address] [address]    April 28, 2025     Dear Mr. Pierce:    Below are the results from your recent visit:    Resulted Orders   Microalbumin / Creatinine Urine Ratio - Urine, Clean Catch   Result Value Ref Range    Microalbumin/Creatinine Ratio 380.9 (H) 0.0 - 29.0 mg/g    Creatinine, Urine 125.5 mg/dL    Microalbumin, Urine 47.8 mg/dL   POC Glucose, Blood   Result Value Ref Range    Glucose 337 (A) 70 - 130 mg/dL    Lot Number [id number]     Expiration Date 10/11/25        The urine protein was elevated.  Good blood sugar control and blood pressure control are both essential to protecting your kidneys.  Continue losartan as this helps to lower urine protein and protect your kidneys.  I recommend that you avoid nonsteroidal anti-inflammatory medications (NSAIDs) such as Advil/ibuprofen and Aleve/naproxen or others that are by prescription only.  We will continue to monitor the urine protein, and hope that it improves when you get your blood glucose levels in normal range again.    If you have any questions or concerns, please don't hesitate to call.         Sincerely,        Donavon Sanford PA-C

## 2025-04-28 NOTE — TELEPHONE ENCOUNTER
Please call patient and let him know that his diabetes is affecting his kidneys and causing a lot of protein in his urine. The urine protein was elevated.  Good blood sugar control and blood pressure control are both essential to protecting your kidneys.  Continue losartan as this helps to lower urine protein and protect your kidneys.  I recommend that you avoid nonsteroidal anti-inflammatory medications (NSAIDs) such as Advil/ibuprofen and Aleve/naproxen or others that are by prescription only.  We will continue to monitor the urine protein, and hope that it improves when you get your blood glucose levels in normal range again.

## 2025-05-06 ENCOUNTER — OFFICE VISIT (OUTPATIENT)
Dept: CARDIOLOGY | Facility: CLINIC | Age: 70
End: 2025-05-06
Payer: MEDICARE

## 2025-05-06 VITALS
OXYGEN SATURATION: 95 % | HEIGHT: 67 IN | SYSTOLIC BLOOD PRESSURE: 128 MMHG | HEART RATE: 76 BPM | WEIGHT: 194.6 LBS | BODY MASS INDEX: 30.54 KG/M2 | DIASTOLIC BLOOD PRESSURE: 78 MMHG

## 2025-05-06 DIAGNOSIS — I10 ESSENTIAL HYPERTENSION: Chronic | ICD-10-CM

## 2025-05-06 DIAGNOSIS — I25.119 CORONARY ARTERY DISEASE INVOLVING NATIVE CORONARY ARTERY OF NATIVE HEART WITH ANGINA PECTORIS: Primary | Chronic | ICD-10-CM

## 2025-05-06 DIAGNOSIS — E78.5 HYPERLIPIDEMIA LDL GOAL <70: Chronic | ICD-10-CM

## 2025-05-06 PROCEDURE — 3074F SYST BP LT 130 MM HG: CPT | Performed by: NURSE PRACTITIONER

## 2025-05-06 PROCEDURE — 3078F DIAST BP <80 MM HG: CPT | Performed by: NURSE PRACTITIONER

## 2025-05-06 PROCEDURE — 1160F RVW MEDS BY RX/DR IN RCRD: CPT | Performed by: NURSE PRACTITIONER

## 2025-05-06 PROCEDURE — 99214 OFFICE O/P EST MOD 30 MIN: CPT | Performed by: NURSE PRACTITIONER

## 2025-05-06 PROCEDURE — 1159F MED LIST DOCD IN RCRD: CPT | Performed by: NURSE PRACTITIONER

## 2025-05-06 PROCEDURE — 93000 ELECTROCARDIOGRAM COMPLETE: CPT | Performed by: NURSE PRACTITIONER

## 2025-05-06 NOTE — ASSESSMENT & PLAN NOTE
Pretension is controlled  Continue metoprolol succinate 100 mg daily  Continue amlodipine 10 mg daily  Continue losartan 100 mg daily  Continue spironolactone 25 mg daily

## 2025-05-09 ENCOUNTER — EXTERNAL PBMM DATA (OUTPATIENT)
Dept: PHARMACY | Facility: OTHER | Age: 70
End: 2025-05-09
Payer: MEDICARE

## 2025-05-12 ENCOUNTER — TELEPHONE (OUTPATIENT)
Dept: FAMILY MEDICINE CLINIC | Facility: CLINIC | Age: 70
End: 2025-05-12

## 2025-05-12 DIAGNOSIS — Z79.4 TYPE 2 DIABETES MELLITUS WITH HYPERGLYCEMIA, WITH LONG-TERM CURRENT USE OF INSULIN: Primary | ICD-10-CM

## 2025-05-12 DIAGNOSIS — E11.65 TYPE 2 DIABETES MELLITUS WITH HYPERGLYCEMIA, WITH LONG-TERM CURRENT USE OF INSULIN: Primary | ICD-10-CM

## 2025-05-12 RX ORDER — INSULIN GLARGINE 300 U/ML
INJECTION, SOLUTION SUBCUTANEOUS
Qty: 90 ML | Refills: 2 | Status: SHIPPED | OUTPATIENT
Start: 2025-05-12

## 2025-05-12 NOTE — TELEPHONE ENCOUNTER
Caller: Gal Pierce    Relationship: Self    Best call back number: [phone]    Which medication are you concerned about: GENI    Who prescribed you this medication: DR VALENCIA    What are your concerns: PATIENT HAD REQUESTED REFILL FOR TOUJEO AND RECEIVED TRESIBA INSTEAD AND DID NOT KNOW WHY. PATIENT WOULD LIKE TO STAY ON TOUJEO PLEASE CLARIFY AND CALL PATIENT BACK

## 2025-06-05 ENCOUNTER — TELEPHONE (OUTPATIENT)
Dept: UROLOGY | Facility: CLINIC | Age: 70
End: 2025-06-05
Payer: MEDICARE

## 2025-06-05 NOTE — TELEPHONE ENCOUNTER
I called and left a VM to remind patient to get his PSA blood work completed prior to his appointment

## 2025-06-18 DIAGNOSIS — I25.119 CORONARY ARTERY DISEASE INVOLVING NATIVE CORONARY ARTERY OF NATIVE HEART WITH ANGINA PECTORIS: Chronic | ICD-10-CM

## 2025-06-18 RX ORDER — CLOPIDOGREL BISULFATE 75 MG/1
75 TABLET ORAL DAILY
Qty: 90 TABLET | Refills: 3 | Status: SHIPPED | OUTPATIENT
Start: 2025-06-18

## 2025-06-18 NOTE — TELEPHONE ENCOUNTER
Rx Refill Note  Requested Prescriptions     Pending Prescriptions Disp Refills    clopidogrel (PLAVIX) 75 MG tablet [Pharmacy Med Name: CLOPIDOGREL 75 MG TABLET] 90 tablet 3     Sig: TAKE 1 TABLET BY MOUTH DAILY      Last office visit with prescribing clinician: 3/27/2025   Last telemedicine visit with prescribing clinician: Visit date not found   Next office visit with prescribing clinician: Visit date not found                         Would you like a call back once the refill request has been completed: [] Yes [] No    If the office needs to give you a call back, can they leave a voicemail: [] Yes [] No    Cheryl Callaway MA  06/18/25, 13:01 EDT

## 2025-06-20 ENCOUNTER — TELEPHONE (OUTPATIENT)
Dept: UROLOGY | Facility: CLINIC | Age: 70
End: 2025-06-20
Payer: MEDICARE

## 2025-07-14 ENCOUNTER — LAB (OUTPATIENT)
Dept: LAB | Facility: HOSPITAL | Age: 70
End: 2025-07-14
Payer: MEDICARE

## 2025-07-14 ENCOUNTER — APPOINTMENT (OUTPATIENT)
Dept: LAB | Facility: HOSPITAL | Age: 70
End: 2025-07-14
Payer: MEDICARE

## 2025-07-14 DIAGNOSIS — I10 ESSENTIAL HYPERTENSION: ICD-10-CM

## 2025-07-14 DIAGNOSIS — Z12.5 ENCOUNTER FOR PROSTATE CANCER SCREENING: ICD-10-CM

## 2025-07-14 DIAGNOSIS — R97.20 ELEVATED PROSTATE SPECIFIC ANTIGEN (PSA): ICD-10-CM

## 2025-07-14 DIAGNOSIS — E55.9 VITAMIN D DEFICIENCY: ICD-10-CM

## 2025-07-14 DIAGNOSIS — E11.65 TYPE 2 DIABETES MELLITUS WITH HYPERGLYCEMIA, WITH LONG-TERM CURRENT USE OF INSULIN: ICD-10-CM

## 2025-07-14 DIAGNOSIS — I25.119 CORONARY ARTERY DISEASE INVOLVING NATIVE CORONARY ARTERY OF NATIVE HEART WITH ANGINA PECTORIS: ICD-10-CM

## 2025-07-14 DIAGNOSIS — Z79.4 TYPE 2 DIABETES MELLITUS WITH HYPERGLYCEMIA, WITH LONG-TERM CURRENT USE OF INSULIN: ICD-10-CM

## 2025-07-14 PROCEDURE — 81001 URINALYSIS AUTO W/SCOPE: CPT

## 2025-07-14 PROCEDURE — 84153 ASSAY OF PSA TOTAL: CPT

## 2025-07-14 PROCEDURE — 36415 COLL VENOUS BLD VENIPUNCTURE: CPT

## 2025-07-14 PROCEDURE — 87086 URINE CULTURE/COLONY COUNT: CPT

## 2025-07-14 PROCEDURE — 82570 ASSAY OF URINE CREATININE: CPT

## 2025-07-14 PROCEDURE — 84154 ASSAY OF PSA FREE: CPT

## 2025-07-14 PROCEDURE — 82043 UR ALBUMIN QUANTITATIVE: CPT

## 2025-07-15 LAB
ALBUMIN UR-MCNC: 45.2 MG/DL
BACTERIA UR QL AUTO: ABNORMAL /HPF
BILIRUB UR QL STRIP: NEGATIVE
CLARITY UR: CLEAR
COLOR UR: YELLOW
CREAT UR-MCNC: 169 MG/DL
GLUCOSE UR STRIP-MCNC: ABNORMAL MG/DL
HGB UR QL STRIP.AUTO: NEGATIVE
HOLD SPECIMEN: NORMAL
HYALINE CASTS UR QL AUTO: ABNORMAL /LPF
KETONES UR QL STRIP: NEGATIVE
LEUKOCYTE ESTERASE UR QL STRIP.AUTO: ABNORMAL
MICROALBUMIN/CREAT UR: 267.5 MG/G (ref 0–29)
NITRITE UR QL STRIP: NEGATIVE
PH UR STRIP.AUTO: 6.5 [PH] (ref 5–8)
PROT UR QL STRIP: ABNORMAL
PSA FREE MFR SERPL: 13.3 %
PSA FREE SERPL-MCNC: 1.16 NG/ML
PSA SERPL-MCNC: 8.7 NG/ML (ref 0–4)
RBC # UR STRIP: ABNORMAL /HPF
REF LAB TEST METHOD: ABNORMAL
SP GR UR STRIP: 1.02 (ref 1–1.03)
SQUAMOUS #/AREA URNS HPF: ABNORMAL /HPF
UROBILINOGEN UR QL STRIP: ABNORMAL
WBC # UR STRIP: ABNORMAL /HPF

## 2025-07-16 LAB — BACTERIA SPEC AEROBE CULT: NO GROWTH

## 2025-07-18 ENCOUNTER — OFFICE VISIT (OUTPATIENT)
Dept: UROLOGY | Facility: CLINIC | Age: 70
End: 2025-07-18
Payer: MEDICARE

## 2025-07-18 VITALS — HEIGHT: 67 IN | BODY MASS INDEX: 29.82 KG/M2 | WEIGHT: 190 LBS

## 2025-07-18 DIAGNOSIS — R97.20 ELEVATED PROSTATE SPECIFIC ANTIGEN (PSA): Primary | ICD-10-CM

## 2025-07-18 NOTE — PROGRESS NOTES
Elevated PSA Office Visit      Patient Name: Gal Pierce  : 1955   MRN: [id number]     Chief Complaint:  Elevated PSA.    Chief Complaint   Patient presents with    Elevated PSA       History of Present Illness: Gal Pierce is a 70 y.o. male who presents today with history of elevated PSA.  Current PSA value 8.7 in 2025.  He has undergone multiparametric MRI which has revealed PI-RADS 5 lesion.  Presents today for further discussion    Subjective      Review of System: Review of Systems   Genitourinary:  Negative for decreased urine volume, difficulty urinating, dysuria, enuresis, flank pain, frequency, hematuria and urgency.      I have reviewed the ROS documented by my clinical staff, updated as appropriate and I agree. Yoni Raygoza MD    Past Medical History:   Past Medical History:   Diagnosis Date    CAD (coronary artery disease)     Diabetes mellitus     Elevated red blood cell count     Heart attack     Hyperlipidemia     Hypertension        Past Surgical History:   Past Surgical History:   Procedure Laterality Date    APPENDECTOMY      CORONARY ARTERY BYPASS GRAFT  10/29/2018    By Osman Slater with LIMA to LAD, SVG to RCA, sequential SVG to OM/LPL       Family History:   Family History   Problem Relation Age of Onset    Diabetes Mother     Aneurysm Mother     Hypertension Father     Diabetes Father     Heart attack Father     Diabetes Sister     No Known Problems Brother     Diabetes Maternal Grandmother     Hypertension Maternal Grandmother     Diabetes Maternal Grandfather     Hypertension Maternal Grandfather     Diabetes Paternal Grandmother     Hypertension Paternal Grandmother     Diabetes Paternal Grandfather     Hypertension Paternal Grandfather     Diabetes Sister     Diabetes Sister     Cancer Sister     Hypertension Brother     Heart disease Brother     Diabetes Brother     Hypertension Brother        Social History:   Social History     Socioeconomic History     Marital status:    Tobacco Use    Smoking status: Former     Current packs/day: 0.00     Average packs/day: 0.3 packs/day for 15.0 years (3.8 ttl pk-yrs)     Types: Cigarettes     Start date:      Quit date:      Years since quittin.5     Passive exposure: Past    Smokeless tobacco: Never    Tobacco comments:     40 years ago   Vaping Use    Vaping status: Never Used   Substance and Sexual Activity    Alcohol use: No    Drug use: No    Sexual activity: Defer       Medications:     Current Outpatient Medications:     amLODIPine (NORVASC) 10 MG tablet, Take 1 tablet by mouth Daily., Disp: 90 tablet, Rfl: 3    aspirin 81 MG EC tablet, Take 1 tablet by mouth Daily., Disp: 90 tablet, Rfl: 3    Blood Glucose Monitoring Suppl (Accu-Chek Guide Me) w/Device kit, , Disp: , Rfl:     clopidogrel (PLAVIX) 75 MG tablet, TAKE 1 TABLET BY MOUTH DAILY, Disp: 90 tablet, Rfl: 3    empagliflozin (Jardiance) 10 MG tablet tablet, Take 1 tablet by mouth Daily., Disp: 90 tablet, Rfl: 3    Evolocumab (REPATHA) solution auto-injector SureClick injection, Inject 1 mL under the skin into the appropriate area as directed Every 14 (Fourteen) Days., Disp: 6 mL, Rfl: 3    fluticasone (FLONASE) 50 MCG/ACT nasal spray, 1 spray into the nostril(s) as directed by provider Daily., Disp: 9.9 mL, Rfl: 11    glucose blood test strip, 1 each by Other route Daily. Use as instructed, Disp: 100 each, Rfl: 3    glucose monitor monitoring kit, Use 1 each Daily., Disp: 1 each, Rfl: 0    hydroxyurea (Hydrea) 500 MG capsule, Take 1 capsule by mouth Daily., Disp: 30 capsule, Rfl: 2    Insulin Glargine, 2 Unit Dial, (Toujeo Max SoloStar) 300 UNIT/ML solution pen-injector injection, Inject 70 units and increase as directed.  MDD 90 units, Disp: 90 mL, Rfl: 2    Insulin Pen Needle (B-D ULTRAFINE III SHORT PEN) 31G X 8 MM misc, Inject 1 each under the skin into the appropriate area as directed Daily., Disp: 100 each, Rfl: 3    losartan (COZAAR)  "100 MG tablet, TAKE 1 TABLET BY MOUTH DAILY, Disp: 90 tablet, Rfl: 3    metoprolol succinate XL (TOPROL-XL) 100 MG 24 hr tablet, Take 1 tablet by mouth Daily., Disp: 90 tablet, Rfl: 3    Multiple Vitamins-Minerals (CENTRUM SILVER PO), Take  by mouth Daily., Disp: , Rfl:     sodium-potassium-magnesium sulfates (Suprep Bowel Prep Kit) 17.5-3.13-1.6 GM/177ML solution oral solution, Use as directed by provider for colonoscopy prep, Disp: 354 mL, Rfl: 0    spironolactone (Aldactone) 25 MG tablet, Take 1 tablet by mouth Daily., Disp: 90 tablet, Rfl: 3    ciprofloxacin (CIPRO) 750 MG tablet, Take 1 tablet by mouth 2 (Two) Times a Day., Disp: 6 tablet, Rfl: 0    Allergies:   Allergies   Allergen Reactions    Hydrochlorothiazide Other (See Comments)     Pt states it makes his blood pressure \"real low\"    Crestor [Rosuvastatin Calcium] Myalgia    Lipitor [Atorvastatin Calcium] Myalgia    Penicillins Rash       IPSS Questionnaire (AUA-7):  Over the past month…    1)  Incomplete Emptying  How often have you had a sensation of not emptying your bladder?  0 - Not at all   2)  Frequency  How often have you had to urinate less than every two hours? 0 - Not at all   3)  Intermittency  How often have you found you stopped and started again several times when you urinated?  0 - Not at all   4) Urgency  How often have you found it difficult to postpone urination?  2 - Less than half the time   5) Weak Stream  How often have you had a weak urinary stream?  0 - Not at all   6) Straining  How often have you had to push or strain to begin urination?  0 - Not at all   7) Nocturia  How many times did you typically get up at night to urinate?  0 - None   Total Score:  2       Quality of life due to urinary symptoms:  If you were to spend the rest of your life with your urinary condition the way it is now, how would you feel about that? 0-Delighted   Urine Leakage (Incontinence) 0-No Leakage         Objective     Physical Exam:   Vital Signs: " "  Vitals:    07/18/25 1023   Weight: 86.2 kg (190 lb)   Height: 170.2 cm (67.01\")     Body mass index is 29.75 kg/m².     Physical Exam  Vitals and nursing note reviewed.   Constitutional:       Appearance: Normal appearance.   HENT:      Head: Normocephalic and atraumatic.   Cardiovascular:      Comments: Well perfused  Pulmonary:      Effort: Pulmonary effort is normal.   Abdominal:      General: Abdomen is flat.      Palpations: Abdomen is soft.   Musculoskeletal:         General: Normal range of motion.   Skin:     General: Skin is warm and dry.   Neurological:      General: No focal deficit present.      Mental Status: He is alert and oriented to person, place, and time. Mental status is at baseline.   Psychiatric:         Mood and Affect: Mood normal.         Behavior: Behavior normal.         Thought Content: Thought content normal.         Judgment: Judgment normal.           Labs:   Hematocrit (%)   Date Value   01/14/2025 49.9   07/23/2024 51.3 (H)   10/17/2022 53.2 (H)   10/17/2022 51.0   08/02/2022 52.8 (H)   06/27/2022 55.9 (H)   03/22/2022 45.2       Lab Results   Component Value Date    PSA 8.7 (H) 07/14/2025    PSA 7.030 (H) 01/14/2025    PSA 7.8 (H) 01/14/2025       Images:   No Images in the past 120 days found..    Measures:   Tobacco:   Gal Pierce  reports that he quit smoking about 35 years ago. His smoking use included cigarettes. He started smoking about 40 years ago. He has a 3.8 pack-year smoking history. He has been exposed to tobacco smoke. He has never used smokeless tobacco. I have educated him on the risk of diseases from using tobacco product.     Assessment / Plan      Assessment:     Mr. Pierce is a 70 y.o. male with elevated PSA.  Current PSA value 8.7.  Increasing PSA trend and therefore multiparametric MRI has been obtained.  He has been lost to follow-up after MRI.  Presents today to discuss findings.  We have discussed the MRI has resulted with PI-RADS 5 and PI-RADS 4 " lesions in the right peripheral zone.  No obvious pelvic lymphadenopathy or osseous disease.  We have recommended fusion guided prostate biopsy given multiparametric MRI findings.  We have discussed the risks and benefits of fusion guided prostate biopsy and indication for biopsy.  He is understanding and agreeable.  He will require cardiac clearance and ability to hold Plavix.  We will coordinate and schedule targeted biopsy further management pending pathologic findings.  We have discussed the indication for periprocedural antibiotic therapy and have sent prescriptions today    Diagnoses and all orders for this visit:    1. Elevated prostate specific antigen (PSA) (Primary)  -     acetaminophen (TYLENOL) tablet 1,000 mg  -     gabapentin (NEURONTIN) capsule 600 mg  -     meloxicam (MOBIC) tablet 15 mg  -     scopolamine patch 1 mg/72 hr  -     Case Request; Standing  -     ceFAZolin (ANCEF) 2,000 mg in sodium chloride 0.9 % 100 mL IVPB  -     Case Request  -     ciprofloxacin (CIPRO) 750 MG tablet; Take 1 tablet by mouth 2 (Two) Times a Day.  Dispense: 6 tablet; Refill: 0    Other orders  -     Follow Anesthesia Guidelines / Protocol; Future  -     Provide NPO Instructions to Patient; Future  -     Provide Patient With Enhanced Recovery Booklet(s) or Handout; Future  -     Provide Chlorhexidine Skin Prep Wipes and Instructions; Future  -     Nursing to Order Blood Glucose on all Inpatients >18 years Old with not BMP Ordered; Future  -     Nursing to Place Order for HgbA1c on Adult Patients >18 Years Old (HgbA1c Within One Month of Admission Acceptable); Future  -     Follow Anesthesia Guidelines / Protocol; Standing  -     Provide Patient With Enhanced Recovery Booklet(s) OR Handout; Standing  -     Verify NPO Status; Standing  -     Place Sequential Compression Device; Standing  -     Maintain Sequential Compression Device; Standing               Yoni Raygoza MD  [facility] [place]

## 2025-07-22 DIAGNOSIS — E11.65 TYPE 2 DIABETES MELLITUS WITH HYPERGLYCEMIA, WITH LONG-TERM CURRENT USE OF INSULIN: ICD-10-CM

## 2025-07-22 DIAGNOSIS — Z79.4 TYPE 2 DIABETES MELLITUS WITH HYPERGLYCEMIA, WITH LONG-TERM CURRENT USE OF INSULIN: ICD-10-CM

## 2025-07-22 RX ORDER — INSULIN DEGLUDEC 200 U/ML
INJECTION, SOLUTION SUBCUTANEOUS
Qty: 27 ML | Refills: 1 | OUTPATIENT
Start: 2025-07-22

## 2025-07-22 NOTE — TELEPHONE ENCOUNTER
Rx Refill Note  Requested Prescriptions     Pending Prescriptions Disp Refills    Insulin Degludec (Tresiba FlexTouch) 200 UNIT/ML solution pen-injector pen injection [Pharmacy Med Name: TRESIBA FLEXTOUCH 200 UNIT/ML] 27 mL 1     Sig: INJECT 70 UNITS AND INCREASE AS DIRECTED, MAX DAILY DOSE 90 UNITS      Last office visit with prescribing clinician: 4/24/2025      Next office visit with prescribing clinician:PATIENT NEEDS TO MAKE A FOLLOW UP APPOINTMENT.     Diane Keller MA  07/22/25, 09:08 EDT

## 2025-07-23 ENCOUNTER — EXTERNAL PBMM DATA (OUTPATIENT)
Dept: PHARMACY | Facility: OTHER | Age: 70
End: 2025-07-23
Payer: MEDICARE

## 2025-07-25 RX ORDER — GABAPENTIN 100 MG/1
600 CAPSULE ORAL ONCE
OUTPATIENT
Start: 2025-07-25 | End: 2025-07-25

## 2025-07-25 RX ORDER — CIPROFLOXACIN 750 MG/1
750 TABLET, FILM COATED ORAL 2 TIMES DAILY
Qty: 6 TABLET | Refills: 0 | Status: SHIPPED | OUTPATIENT
Start: 2025-07-25

## 2025-07-25 RX ORDER — ACETAMINOPHEN 500 MG
1000 TABLET ORAL ONCE
OUTPATIENT
Start: 2025-07-25 | End: 2025-07-25

## 2025-07-25 RX ORDER — MELOXICAM 7.5 MG/1
15 TABLET ORAL ONCE
OUTPATIENT
Start: 2025-07-25 | End: 2025-07-25

## 2025-07-25 RX ORDER — SCOPOLAMINE 1 MG/3D
1 PATCH, EXTENDED RELEASE TRANSDERMAL CONTINUOUS
OUTPATIENT
Start: 2025-07-25 | End: 2025-07-28

## 2025-07-28 ENCOUNTER — TELEPHONE (OUTPATIENT)
Dept: CARDIOLOGY | Facility: CLINIC | Age: 70
End: 2025-07-28
Payer: MEDICARE

## 2025-07-28 NOTE — TELEPHONE ENCOUNTER
Dulce Maria CORT[facility] at  Urology is calling to request cardiac clearance for an upcoming prostate biopsy with Dr. Yoni Raygoza scheduled 8/14/2025.     CABG (10/29/2018): LIMA to LAD, SVG to RCA, sequential SVG to OM and LPL     He is on Plavix and ASA 81 mg daily. Okay to proceed, hold Plavix and continue ASA daily?

## 2025-08-07 ENCOUNTER — PRE-ADMISSION TESTING (OUTPATIENT)
Dept: PREADMISSION TESTING | Facility: HOSPITAL | Age: 70
End: 2025-08-07
Payer: MEDICARE

## 2025-08-07 VITALS — WEIGHT: 187.06 LBS | HEIGHT: 67 IN | BODY MASS INDEX: 29.36 KG/M2

## 2025-08-07 LAB
DEPRECATED RDW RBC AUTO: 53.6 FL (ref 37–54)
ERYTHROCYTE [DISTWIDTH] IN BLOOD BY AUTOMATED COUNT: 16.6 % (ref 12.3–15.4)
HBA1C MFR BLD: 7.71 % (ref 4.8–5.6)
HCT VFR BLD AUTO: 53.9 % (ref 37.5–51)
HGB BLD-MCNC: 17.5 G/DL (ref 13–17.7)
INR PPP: 1.04 (ref 0.89–1.12)
MCH RBC QN AUTO: 29.4 PG (ref 26.6–33)
MCHC RBC AUTO-ENTMCNC: 32.5 G/DL (ref 31.5–35.7)
MCV RBC AUTO: 90.6 FL (ref 79–97)
PLATELET # BLD AUTO: 330 10*3/MM3 (ref 140–450)
PMV BLD AUTO: 9.5 FL (ref 6–12)
POTASSIUM SERPL-SCNC: 3.8 MMOL/L (ref 3.5–5.2)
PROTHROMBIN TIME: 14.3 SECONDS (ref 12.2–15.3)
RBC # BLD AUTO: 5.95 10*6/MM3 (ref 4.14–5.8)
WBC NRBC COR # BLD AUTO: 4.51 10*3/MM3 (ref 3.4–10.8)

## 2025-08-07 PROCEDURE — 85027 COMPLETE CBC AUTOMATED: CPT

## 2025-08-07 PROCEDURE — 36415 COLL VENOUS BLD VENIPUNCTURE: CPT

## 2025-08-07 PROCEDURE — 83036 HEMOGLOBIN GLYCOSYLATED A1C: CPT

## 2025-08-07 PROCEDURE — 84132 ASSAY OF SERUM POTASSIUM: CPT

## 2025-08-07 PROCEDURE — 85610 PROTHROMBIN TIME: CPT

## 2025-08-13 ENCOUNTER — ANESTHESIA EVENT (OUTPATIENT)
Dept: PERIOP | Facility: HOSPITAL | Age: 70
End: 2025-08-13
Payer: MEDICARE

## 2025-08-13 RX ORDER — SODIUM CHLORIDE 0.9 % (FLUSH) 0.9 %
10 SYRINGE (ML) INJECTION EVERY 12 HOURS SCHEDULED
Status: CANCELLED | OUTPATIENT
Start: 2025-08-13

## 2025-08-13 RX ORDER — FAMOTIDINE 10 MG/ML
20 INJECTION, SOLUTION INTRAVENOUS ONCE
Status: CANCELLED | OUTPATIENT
Start: 2025-08-13 | End: 2025-08-13

## 2025-08-14 ENCOUNTER — ANESTHESIA (OUTPATIENT)
Dept: PERIOP | Facility: HOSPITAL | Age: 70
End: 2025-08-14
Payer: MEDICARE

## 2025-08-14 ENCOUNTER — HOSPITAL ENCOUNTER (OUTPATIENT)
Facility: HOSPITAL | Age: 70
Setting detail: HOSPITAL OUTPATIENT SURGERY
Discharge: HOME | End: 2025-08-14
Attending: UROLOGY | Admitting: UROLOGY
Payer: MEDICARE

## 2025-08-14 VITALS
OXYGEN SATURATION: 93 % | HEIGHT: 67 IN | RESPIRATION RATE: 16 BRPM | DIASTOLIC BLOOD PRESSURE: 96 MMHG | SYSTOLIC BLOOD PRESSURE: 178 MMHG | BODY MASS INDEX: 29.34 KG/M2 | WEIGHT: 186.95 LBS | TEMPERATURE: 98 F | HEART RATE: 70 BPM

## 2025-08-14 DIAGNOSIS — R97.20 ELEVATED PROSTATE SPECIFIC ANTIGEN (PSA): ICD-10-CM

## 2025-08-14 LAB — GLUCOSE BLDC GLUCOMTR-MCNC: 154 MG/DL (ref 70–130)

## 2025-08-14 PROCEDURE — 25010000002 FENTANYL CITRATE (PF) 100 MCG/2ML SOLUTION

## 2025-08-14 PROCEDURE — G0416 PROSTATE BIOPSY, ANY MTHD: HCPCS | Performed by: UROLOGY

## 2025-08-14 PROCEDURE — 55700 PR PROSTATE NEEDLE BIOPSY ANY APPROACH: CPT | Performed by: UROLOGY

## 2025-08-14 PROCEDURE — 25010000002 LIDOCAINE 1 % SOLUTION

## 2025-08-14 PROCEDURE — 76942 ECHO GUIDE FOR BIOPSY: CPT | Performed by: UROLOGY

## 2025-08-14 PROCEDURE — 25010000002 LIDOCAINE PF 1% 1 % SOLUTION: Performed by: UROLOGY

## 2025-08-14 PROCEDURE — 25010000002 LIDOCAINE PF 1% 1 % SOLUTION: Performed by: ANESTHESIOLOGY

## 2025-08-14 PROCEDURE — 25810000003 LACTATED RINGERS PER 1000 ML: Performed by: ANESTHESIOLOGY

## 2025-08-14 PROCEDURE — 25010000002 CEFAZOLIN PER 500 MG: Performed by: UROLOGY

## 2025-08-14 PROCEDURE — 25010000002 PROPOFOL 10 MG/ML EMULSION

## 2025-08-14 PROCEDURE — 82948 REAGENT STRIP/BLOOD GLUCOSE: CPT

## 2025-08-14 RX ORDER — LIDOCAINE HYDROCHLORIDE 10 MG/ML
0.5 INJECTION, SOLUTION EPIDURAL; INFILTRATION; INTRACAUDAL; PERINEURAL ONCE AS NEEDED
Status: COMPLETED | OUTPATIENT
Start: 2025-08-14 | End: 2025-08-14

## 2025-08-14 RX ORDER — FENTANYL CITRATE 50 UG/ML
INJECTION, SOLUTION INTRAMUSCULAR; INTRAVENOUS AS NEEDED
Status: DISCONTINUED | OUTPATIENT
Start: 2025-08-14 | End: 2025-08-14 | Stop reason: SURG

## 2025-08-14 RX ORDER — MIDAZOLAM HYDROCHLORIDE 1 MG/ML
0.5 INJECTION, SOLUTION INTRAMUSCULAR; INTRAVENOUS
Status: DISCONTINUED | OUTPATIENT
Start: 2025-08-14 | End: 2025-08-14 | Stop reason: HOSPADM

## 2025-08-14 RX ORDER — FENTANYL CITRATE 50 UG/ML
50 INJECTION, SOLUTION INTRAMUSCULAR; INTRAVENOUS
Status: DISCONTINUED | OUTPATIENT
Start: 2025-08-14 | End: 2025-08-14 | Stop reason: HOSPADM

## 2025-08-14 RX ORDER — ACETAMINOPHEN 500 MG
1000 TABLET ORAL ONCE
Status: COMPLETED | OUTPATIENT
Start: 2025-08-14 | End: 2025-08-14

## 2025-08-14 RX ORDER — LIDOCAINE HYDROCHLORIDE 10 MG/ML
INJECTION, SOLUTION EPIDURAL; INFILTRATION; INTRACAUDAL; PERINEURAL AS NEEDED
Status: DISCONTINUED | OUTPATIENT
Start: 2025-08-14 | End: 2025-08-14 | Stop reason: HOSPADM

## 2025-08-14 RX ORDER — ONDANSETRON 2 MG/ML
4 INJECTION INTRAMUSCULAR; INTRAVENOUS ONCE AS NEEDED
Status: DISCONTINUED | OUTPATIENT
Start: 2025-08-14 | End: 2025-08-14 | Stop reason: HOSPADM

## 2025-08-14 RX ORDER — MELOXICAM 15 MG/1
15 TABLET ORAL ONCE
Status: COMPLETED | OUTPATIENT
Start: 2025-08-14 | End: 2025-08-14

## 2025-08-14 RX ORDER — GABAPENTIN 300 MG/1
600 CAPSULE ORAL ONCE
Status: COMPLETED | OUTPATIENT
Start: 2025-08-14 | End: 2025-08-14

## 2025-08-14 RX ORDER — PROPOFOL 10 MG/ML
VIAL (ML) INTRAVENOUS CONTINUOUS PRN
Status: DISCONTINUED | OUTPATIENT
Start: 2025-08-14 | End: 2025-08-14 | Stop reason: SURG

## 2025-08-14 RX ORDER — SODIUM CHLORIDE 0.9 % (FLUSH) 0.9 %
10 SYRINGE (ML) INJECTION AS NEEDED
Status: DISCONTINUED | OUTPATIENT
Start: 2025-08-14 | End: 2025-08-14 | Stop reason: HOSPADM

## 2025-08-14 RX ORDER — LIDOCAINE HYDROCHLORIDE 10 MG/ML
INJECTION, SOLUTION INFILTRATION; PERINEURAL AS NEEDED
Status: DISCONTINUED | OUTPATIENT
Start: 2025-08-14 | End: 2025-08-14 | Stop reason: SURG

## 2025-08-14 RX ORDER — FAMOTIDINE 20 MG/1
20 TABLET, FILM COATED ORAL ONCE
Status: COMPLETED | OUTPATIENT
Start: 2025-08-14 | End: 2025-08-14

## 2025-08-14 RX ORDER — SODIUM CHLORIDE, SODIUM LACTATE, POTASSIUM CHLORIDE, CALCIUM CHLORIDE 600; 310; 30; 20 MG/100ML; MG/100ML; MG/100ML; MG/100ML
9 INJECTION, SOLUTION INTRAVENOUS CONTINUOUS
Status: DISCONTINUED | OUTPATIENT
Start: 2025-08-15 | End: 2025-08-14 | Stop reason: HOSPADM

## 2025-08-14 RX ORDER — HYDROMORPHONE HYDROCHLORIDE 1 MG/ML
0.5 INJECTION, SOLUTION INTRAMUSCULAR; INTRAVENOUS; SUBCUTANEOUS
Status: DISCONTINUED | OUTPATIENT
Start: 2025-08-14 | End: 2025-08-14 | Stop reason: HOSPADM

## 2025-08-14 RX ADMIN — MELOXICAM 15 MG: 15 TABLET ORAL at 09:49

## 2025-08-14 RX ADMIN — FAMOTIDINE 20 MG: 20 TABLET, FILM COATED ORAL at 09:49

## 2025-08-14 RX ADMIN — SODIUM CHLORIDE, POTASSIUM CHLORIDE, SODIUM LACTATE AND CALCIUM CHLORIDE 9 ML/HR: 600; 310; 30; 20 INJECTION, SOLUTION INTRAVENOUS at 09:44

## 2025-08-14 RX ADMIN — GABAPENTIN 600 MG: 300 CAPSULE ORAL at 09:49

## 2025-08-14 RX ADMIN — PROPOFOL 100 MCG/KG/MIN: 10 INJECTION, EMULSION INTRAVENOUS at 12:08

## 2025-08-14 RX ADMIN — LIDOCAINE HYDROCHLORIDE 0.5 ML: 10 INJECTION, SOLUTION EPIDURAL; INFILTRATION; INTRACAUDAL; PERINEURAL at 09:49

## 2025-08-14 RX ADMIN — LIDOCAINE HYDROCHLORIDE 50 MG: 10 INJECTION, SOLUTION INFILTRATION; PERINEURAL at 12:07

## 2025-08-14 RX ADMIN — FENTANYL CITRATE 25 MCG: 50 INJECTION, SOLUTION INTRAMUSCULAR; INTRAVENOUS at 12:07

## 2025-08-14 RX ADMIN — ACETAMINOPHEN 1000 MG: 500 TABLET, FILM COATED ORAL at 09:49

## 2025-08-14 RX ADMIN — SODIUM CHLORIDE 2000 MG: 900 INJECTION INTRAVENOUS at 12:08

## 2025-08-14 RX ADMIN — PROPOFOL 30 MG: 10 INJECTION, EMULSION INTRAVENOUS at 12:07

## 2025-08-14 RX ADMIN — PROPOFOL 10 MG: 10 INJECTION, EMULSION INTRAVENOUS at 12:14

## 2025-08-15 ENCOUNTER — TELEPHONE (OUTPATIENT)
Dept: UROLOGY | Facility: CLINIC | Age: 70
End: 2025-08-15
Payer: MEDICARE

## 2025-08-22 ENCOUNTER — OFFICE VISIT (OUTPATIENT)
Age: 70
End: 2025-08-22
Payer: MEDICARE

## 2025-08-22 VITALS — HEIGHT: 67 IN | BODY MASS INDEX: 29.03 KG/M2 | WEIGHT: 185 LBS

## 2025-08-22 DIAGNOSIS — R97.20 ELEVATED PROSTATE SPECIFIC ANTIGEN (PSA): Primary | ICD-10-CM

## 2025-08-22 DIAGNOSIS — C61 PROSTATE CANCER: ICD-10-CM

## 2025-08-22 RX ORDER — CIPROFLOXACIN 750 MG/1
750 TABLET, FILM COATED ORAL 2 TIMES DAILY
Qty: 20 TABLET | Refills: 0 | Status: SHIPPED | OUTPATIENT
Start: 2025-08-22

## 2025-08-28 PROBLEM — C61 PROSTATE CANCER: Status: ACTIVE | Noted: 2025-08-28

## (undated) DEVICE — STERILE ULTRASOUND GEL, SAFEWRAP: Brand: ECOVUE

## (undated) DEVICE — SHEET,DRAPE,40X58,STERILE: Brand: MEDLINE

## (undated) DEVICE — NDL SPINE 22G 7IN BLK

## (undated) DEVICE — CVR TRANSD NEOGUARD 2X30CM LF STRL

## (undated) DEVICE — GLV SURG BIOGEL LTX PF 7 1/2

## (undated) DEVICE — 6 BLANK STERILE LABELS 3/8 X 1 1/2: Brand: CENTURION

## (undated) DEVICE — GUIDE NDL BIOP BIPLANE 17G STRL 1P/U

## (undated) DEVICE — TOWEL,OR,DSP,ST,BLUE,STD,4/PK,20PK/CS: Brand: MEDLINE

## (undated) DEVICE — CONTAINER,SPECIMEN,OR STERILE,4OZ: Brand: MEDLINE

## (undated) DEVICE — MAX-CORE® DISPOSABLE CORE BIOPSY INSTRUMENT, 18G X 25CM: Brand: MAX-CORE

## (undated) DEVICE — INTENDED USE FOR SURGICAL MARKING ON INTACT SKIN, ALSO PROVIDES A PERMANENT METHOD OF IDENTIFYING OBJECTS IN THE OPERATING ROOM: Brand: WRITESITE® REGULAR TIP SKIN MARKER

## (undated) DEVICE — PAD,ARMBOARD,CONV,FOAM,2X8X20",12PR/CS: Brand: MEDLINE

## (undated) DEVICE — PAD,NON-ADHERENT,3X8,STERILE,LF,1/PK: Brand: CURAD